# Patient Record
Sex: MALE | Race: WHITE | NOT HISPANIC OR LATINO | Employment: UNEMPLOYED | URBAN - METROPOLITAN AREA
[De-identification: names, ages, dates, MRNs, and addresses within clinical notes are randomized per-mention and may not be internally consistent; named-entity substitution may affect disease eponyms.]

---

## 2015-08-11 LAB — HEP C HIM: NORMAL

## 2020-02-04 ENCOUNTER — TRANSFERRED RECORDS (OUTPATIENT)
Dept: MULTI SPECIALTY CLINIC | Facility: CLINIC | Age: 60
End: 2020-02-04

## 2020-02-04 LAB
CHOLEST SERPL-MCNC: 192 MG/DL (ref 100–199)
CREAT SERPL-MCNC: 1.26 MG/DL (ref 0.72–1.25)
GFR SERPL CREATININE-BSD FRML MDRD: 58 ML/MIN/1.73M2
GLUCOSE SERPL-MCNC: 90 MG/DL (ref 65–100)
HDLC SERPL-MCNC: 30 MG/DL
LDLC SERPL CALC-MCNC: 139 MG/DL
NONHDLC SERPL-MCNC: 162 MG/DL
POTASSIUM SERPL-SCNC: 3.9 MMOL/L (ref 3.5–5)
TRIGL SERPL-MCNC: 114 MG/DL
TSH SERPL-ACNC: 3.13 UIU/ML (ref 0.35–4.94)

## 2020-03-03 ENCOUNTER — OFFICE VISIT (OUTPATIENT)
Dept: INTERNAL MEDICINE | Facility: CLINIC | Age: 60
End: 2020-03-03
Payer: COMMERCIAL

## 2020-03-03 VITALS
SYSTOLIC BLOOD PRESSURE: 113 MMHG | WEIGHT: 222.7 LBS | TEMPERATURE: 98.6 F | BODY MASS INDEX: 34.95 KG/M2 | RESPIRATION RATE: 16 BRPM | DIASTOLIC BLOOD PRESSURE: 72 MMHG | OXYGEN SATURATION: 96 % | HEART RATE: 66 BPM | HEIGHT: 67 IN

## 2020-03-03 DIAGNOSIS — I10 BENIGN ESSENTIAL HYPERTENSION: Primary | ICD-10-CM

## 2020-03-03 DIAGNOSIS — E78.5 HYPERLIPIDEMIA LDL GOAL <130: ICD-10-CM

## 2020-03-03 DIAGNOSIS — G47.33 OSA (OBSTRUCTIVE SLEEP APNEA): ICD-10-CM

## 2020-03-03 DIAGNOSIS — E03.9 ACQUIRED HYPOTHYROIDISM: ICD-10-CM

## 2020-03-03 PROCEDURE — 99203 OFFICE O/P NEW LOW 30 MIN: CPT | Performed by: INTERNAL MEDICINE

## 2020-03-03 RX ORDER — ASPIRIN 81 MG/1
1 TABLET ORAL DAILY
COMMUNITY
Start: 2019-05-29

## 2020-03-03 RX ORDER — TRIAMTERENE/HYDROCHLOROTHIAZID 37.5-25 MG
1 TABLET ORAL DAILY
COMMUNITY
Start: 2020-03-02 | End: 2021-03-05

## 2020-03-03 RX ORDER — ACETAMINOPHEN 160 MG
2 TABLET,DISINTEGRATING ORAL DAILY
COMMUNITY
Start: 2019-12-31 | End: 2020-04-01

## 2020-03-03 RX ORDER — ATENOLOL 50 MG/1
50 TABLET ORAL DAILY
COMMUNITY
Start: 2019-01-04 | End: 2020-03-31

## 2020-03-03 RX ORDER — CALCIUM CARBONATE 750 MG/1
1500 TABLET, CHEWABLE ORAL DAILY
COMMUNITY
Start: 2015-02-04

## 2020-03-03 RX ORDER — LISINOPRIL 10 MG/1
10 TABLET ORAL DAILY
COMMUNITY
Start: 2020-03-02 | End: 2021-03-01

## 2020-03-03 RX ORDER — ATORVASTATIN CALCIUM 10 MG/1
10 TABLET, FILM COATED ORAL DAILY
COMMUNITY
Start: 2020-02-04 | End: 2020-09-17

## 2020-03-03 RX ORDER — LEVOTHYROXINE SODIUM 150 UG/1
1 TABLET ORAL DAILY
COMMUNITY
Start: 2020-03-02 | End: 2021-03-01

## 2020-03-03 SDOH — HEALTH STABILITY: MENTAL HEALTH: HOW OFTEN DO YOU HAVE A DRINK CONTAINING ALCOHOL?: MONTHLY OR LESS

## 2020-03-03 ASSESSMENT — MIFFLIN-ST. JEOR: SCORE: 1778.79

## 2020-03-03 NOTE — PROGRESS NOTES
Subjective     Grant Shoemaker is a 59 year old male who presents to clinic today for the following health issues:    HPI     He presents as a new patient to this clinic.  His previous care had been through [facility].     Problems:  1.  Hypertension: He has been stable on this medication.  2.  Hyperlipidemia: He has had some issues with statins, had a lot of muscle problems with simvastatin.  He is tolerated pravastatin, atorvastatin.  3.  Hypothyroidism: He has been stable on his current medication dose.  4.  Obstructive sleep apnea: He is doing well on CPAP, the last machine was replaced in 2018 but he does need some supplies for this.    He has a previous history of some mood issues which have resolved.  He has had adenomatous polyps of the colon.      Please abstract the following data from this visit with this patient into the appropriate field in Epic:    Tests that can be patient reported without a hard copy:    Colonoscopy done on this date: 4/24/18 (approximately), by this group: [facility], results were normal, recheck 5 years.     Please abstract the following data from this visit with this patient into the appropriate field in Epic:      Other Tests found in the patient's chart through Chart Review/Care Everywhere:    Hepatitis C done by this group [facility] on this date: 8/11/2015    Note to Abstraction: If this section is blank, no results were found via Chart Review/Care Everywhere.      Patient Active Problem List   Diagnosis     ANN (obstructive sleep apnea)     Hyperlipidemia LDL goal <130     Benign essential hypertension     Acquired hypothyroidism     Past Medical History:   Diagnosis Date     Adenomatous polyp of colon      Hyperlipidemia      Hypertension      Hypothyroidism      ANN (obstructive sleep apnea)       Past Surgical History:   Procedure Laterality Date     C CERV ARTIFIC DISKECTOMY       C LAMINOTOMY, SINGLE CERVICAL       RELEASE CARPAL TUNNEL       RELEASE TRIGGER FINGER      x6     "  Current Outpatient Medications   Medication Sig Dispense Refill     ASPIRIN ADULT LOW STRENGTH 81 MG EC tablet Take 1 tablet by mouth daily       atenolol (TENORMIN) 50 MG tablet Take 50 mg by mouth daily       atorvastatin (LIPITOR) 10 MG tablet Take 10 mg by mouth daily       calcium carbonate 750 MG CHEW Take 1,500 mg by mouth daily       Cholecalciferol (VITAMIN D3) 50 MCG (2000 UT) CAPS Take 2 tablets by mouth daily       levothyroxine (SYNTHROID/LEVOTHROID) 150 MCG tablet Take 1 tablet by mouth daily       lisinopril (ZESTRIL) 10 MG tablet Take 10 mg by mouth daily       triamterene-HCTZ (MAXZIDE-25) 37.5-25 MG tablet Take 1 tablet by mouth daily        Social History     Tobacco Use     Smoking status: Never Smoker     Smokeless tobacco: Never Used   Substance Use Topics     Alcohol use: Yes     Frequency: Monthly or less     Drug use: Never      .  Family History   Problem Relation Age of Onset     Pancreatic Cancer Mother      Esophageal Cancer Father      Thyroid Disease Sister             Reviewed and updated as needed this visit by Provider         Review of Systems   No fever, chills, no dyspnea on exertion, no chest pain, palpitations, PND, orthopnea, edema, syncope, headache, abdominal pain       Objective    /72 (BP Location: Left arm, Patient Position: Sitting, Cuff Size: Adult Regular)   Pulse 66   Temp 98.6  F (37  C) (Oral)   Resp 16   Ht 1.702 m (5' 7\")   Wt 101 kg (222 lb 11.2 oz)   SpO2 96%   BMI 34.88 kg/m    Body mass index is 34.88 kg/m .  Physical Exam     Lungs:  CV: normal S1, S2 without murmur, S3 or S4.   Carotids without bruits            Assessment & Plan     1. Benign essential hypertension  Well-controlled, continue medication    2. Hyperlipidemia LDL goal <130  Recommend check labs soon  - Lipid panel reflex to direct LDL Fasting; Future  - ALT; Future    3. Acquired hypothyroidism  Labs controlled recently, continue medication    4. ANN (obstructive sleep " apnea)  Provided with orders for CPAP supplies  - Sleep DME         Return in about 5 months (around 8/1/2020) for Physical Exam.    Sheridan Mistry MD  [facility]

## 2020-03-03 NOTE — Clinical Note
Tests that can be patient reported without a hard copy:    Colonoscopy done on this date: 4/24/18 (approximately), by this group: [facility], results were normal, recheck 5 years.     Please abstract the following data from this visit with this patient into the appropriate field in Epic:      Other Tests found in the patient's chart through Chart Review/Care Everywhere:    Hepatitis C done by this group [facility] on this date: 8/11/2015    Note to Abstraction: If this section is blank, no results were found via Chart Review/Care Everywhere.

## 2020-03-17 PROBLEM — E78.5 HYPERLIPIDEMIA LDL GOAL <130: Status: ACTIVE | Noted: 2020-03-17

## 2020-03-17 PROBLEM — E03.9 ACQUIRED HYPOTHYROIDISM: Status: ACTIVE | Noted: 2020-03-17

## 2020-03-17 PROBLEM — I10 BENIGN ESSENTIAL HYPERTENSION: Status: ACTIVE | Noted: 2020-03-17

## 2020-03-25 ENCOUNTER — OFFICE VISIT (OUTPATIENT)
Dept: URGENT CARE | Facility: URGENT CARE | Age: 60
End: 2020-03-25
Payer: COMMERCIAL

## 2020-03-25 VITALS
OXYGEN SATURATION: 98 % | RESPIRATION RATE: 20 BRPM | TEMPERATURE: 97.8 F | HEART RATE: 78 BPM | BODY MASS INDEX: 34.77 KG/M2 | WEIGHT: 222 LBS | DIASTOLIC BLOOD PRESSURE: 74 MMHG | SYSTOLIC BLOOD PRESSURE: 120 MMHG

## 2020-03-25 DIAGNOSIS — Z20.822 SUSPECTED COVID-19 VIRUS INFECTION: Primary | ICD-10-CM

## 2020-03-25 DIAGNOSIS — J02.9 SORE THROAT: ICD-10-CM

## 2020-03-25 LAB
DEPRECATED S PYO AG THROAT QL EIA: NEGATIVE
SPECIMEN SOURCE: NORMAL
SPECIMEN SOURCE: NORMAL
STREP GROUP A PCR: NOT DETECTED

## 2020-03-25 PROCEDURE — 40001204 ZZHCL STATISTIC STREP A RAPID: Performed by: FAMILY MEDICINE

## 2020-03-25 PROCEDURE — 87651 STREP A DNA AMP PROBE: CPT | Performed by: FAMILY MEDICINE

## 2020-03-25 PROCEDURE — 99213 OFFICE O/P EST LOW 20 MIN: CPT | Performed by: PHYSICIAN ASSISTANT

## 2020-03-25 NOTE — PATIENT INSTRUCTIONS
Patient Education     Self-Care for Sore Throats    Sore throats happen for many reasons, such as colds, allergies, and infections caused by viruses or bacteria. In any case, your throat becomes red and sore. Your goal for self-care is to reduce your discomfort while giving your throat a chance to heal.  Moisten and soothe your throat  Tips include the following:    Try a sip of water first thing after waking up.    Keep your throat moist by drinking 6 or more glasses of clear liquids every day.    Run a cool-air humidifier in your room overnight.    Avoid cigarette smoke.     Suck on throat lozenges, cough drops, hard candy, ice chips, or frozen fruit-juice bars. Use the sugar-free versions if your diet or medical condition requires them.  Gargle to ease irritation  Gargling every hour or 2 can ease irritation. Try gargling with 1 of these solutions:    1/4 teaspoon of salt in 1/2 cup of warm water    An over-the-counter anesthetic gargle  Use medicine for more relief  Over-the-counter medicine can reduce sore throat symptoms. Ask your pharmacist if you have questions about which medicine to use:    Ease pain with anesthetic sprays. Aspirin or an aspirin substitute also helps. Remember, never give aspirin to anyone 18 or younger, or if you are already taking blood thinners.     For sore throats caused by allergies, try antihistamines to block the allergic reaction.    Remember: unless a sore throat is caused by a bacterial infection, antibiotics won t help you.  Prevent future sore throats  Prevention tips include the following:    Stop smoking or reduce contact with secondhand smoke. Smoke irritates the tender throat lining.    Limit contact with pets and with allergy-causing substances, such as pollen and mold.    When you re around someone with a sore throat or cold, wash your hands often to keep viruses or bacteria from spreading.    Don t strain your vocal cords.  Contact your healthcare provider if you  have:    A temperature over 101 F (38.3 C)    White spots on the throat    Great difficulty swallowing    Trouble breathing    A skin rash    Recent exposure to someone else with strep bacteria    Severe hoarseness and swollen glands in the neck or jaw  Date Last Reviewed: 8/1/2016 2000-2019 [employer]. [address] [place] [id number]. All rights reserved. This information is not intended as a substitute for professional medical care. Always follow your healthcare professional's instructions.

## 2020-03-25 NOTE — LETTER
[facility]  [address]  [address]  [place] [address]  Phone: [phone]  Fax: [phone]    March 25, 2020        Grant Shoemaker  [address] [address]          To whom it may concern:    RE: Grant Shoemaker    Patient was seen and treated today at our clinic.  Please excuse from work for 7 days.  If Mr. Shoemaker is symptomatic at 7 days he must stay home until asymptomatic for 72 hours.     Please contact me for questions or concerns.      Sincerely,        Willy Lamas PA-C

## 2020-03-31 DIAGNOSIS — Z78.9 TAKES DIETARY SUPPLEMENTS: ICD-10-CM

## 2020-03-31 DIAGNOSIS — I10 BENIGN ESSENTIAL HYPERTENSION: Primary | ICD-10-CM

## 2020-03-31 NOTE — TELEPHONE ENCOUNTER
"Requested Prescriptions   Pending Prescriptions Disp Refills     atenolol (TENORMIN) 50 MG tablet       Sig: Take 1 tablet (50 mg) by mouth daily       Beta-Blockers Protocol Passed - 3/31/2020 11:54 AM        Passed - Blood pressure under 140/90 in past 12 months     BP Readings from Last 3 Encounters:   03/25/20 120/74   03/03/20 113/72                 Passed - Patient is age 6 or older        Passed - Recent (12 mo) or future (30 days) visit within the authorizing provider's specialty     Patient has had an office visit with the authorizing provider or a provider within the authorizing providers department within the previous 12 mos or has a future within next 30 days. See \"Patient Info\" tab in inbasket, or \"Choose Columns\" in Meds & Orders section of the refill encounter.              Passed - Medication is active on med list           Cholecalciferol (VITAMIN D3) 50 MCG (2000 UT) CAPS       Sig: Take 2 tablets by mouth daily       Vitamin Supplements (Adult) Protocol Passed - 3/31/2020 11:54 AM        Passed - High dose Vitamin D not ordered        Passed - Recent (12 mo) or future (30 days) visit within the authorizing provider's specialty     Patient has had an office visit with the authorizing provider or a provider within the authorizing providers department within the previous 12 mos or has a future within next 30 days. See \"Patient Info\" tab in inbasket, or \"Choose Columns\" in Meds & Orders section of the refill encounter.              Passed - Medication is active on med list           Patient calling for refills- no need to callback if OK.  Requested Prescriptions   Pending Prescriptions Disp Refills     atenolol (TENORMIN) 50 MG tablet        Sig: Take 1 tablet (50 mg) by mouth daily       There is no refill protocol information for this order        Cholecalciferol (VITAMIN D3) 50 MCG (2000 UT) CAPS        Sig: Take 2 tablets by mouth daily       There is no refill protocol information for this order "        Last Written Prescription Date: other clinic provider  Last Fill Quantity: ,  # refills:    Last office visit: 3/3/2020 with prescribing provider:  Fidelia   Future Office Visit:  Phys due 8-1-2020.

## 2020-04-01 RX ORDER — ATENOLOL 50 MG/1
50 TABLET ORAL DAILY
Qty: 90 TABLET | Refills: 1 | Status: SHIPPED | OUTPATIENT
Start: 2020-04-01 | End: 2020-10-06

## 2020-04-01 RX ORDER — ACETAMINOPHEN 160 MG
2 TABLET,DISINTEGRATING ORAL DAILY
Qty: 180 CAPSULE | Refills: 1 | Status: SHIPPED | OUTPATIENT
Start: 2020-04-01 | End: 2020-10-06

## 2020-06-16 ENCOUNTER — TELEPHONE (OUTPATIENT)
Dept: INTERNAL MEDICINE | Facility: CLINIC | Age: 60
End: 2020-06-16

## 2020-06-16 NOTE — TELEPHONE ENCOUNTER
Called patient and he stated the pain started a couple of weeks ago.  Patient describes it as sharp, cramping pain with spasms in bilateral back below his ribcage.  Patient stated he was having similar sharp, cramp like pains that were in his legs.  Patient wondering if he should stop the medication for a week to see if it help.  Patient stated that his pain happens during the night and in am.  Patient stated he has had back pain in the past and states this pain feels different.  Patient denied any radiating pain.     Ok to leave detailed message.

## 2020-06-16 NOTE — TELEPHONE ENCOUNTER
Patient calling  He has had some lower back pain on both sides of his back and feels its because of   Atorvastatin  Please advise  Ok to call and beti [phone]

## 2020-06-17 NOTE — TELEPHONE ENCOUNTER
This is probably muscle strain. Most problems with statins hurt everywhere. He can hold the med for 10-14 days but the back pain may get better with time anyway so if symptoms go away, he can retry it every other day. If the pain does not improve, needs to be seen here or by [facility].

## 2020-06-17 NOTE — TELEPHONE ENCOUNTER
Called patient and advised him of message below. Patient verbalized understanding and agrees to plan. Patient will call back in 10-14 days to provide an update.     Patient reports in the past he had a similar reaction in his legs to pravastatin. Will route to primary care provider as an FYI.

## 2020-06-17 NOTE — TELEPHONE ENCOUNTER
Patient calls back to clarify, it could have been Pravastatin or Simvastatin that he had taken in the past that caused pain in his legs. [facility] has taken both in the past.

## 2020-09-07 ENCOUNTER — NURSE TRIAGE (OUTPATIENT)
Dept: NURSING | Facility: CLINIC | Age: 60
End: 2020-09-07

## 2020-09-07 NOTE — TELEPHONE ENCOUNTER
"Patient states has 5  \"plantar warts\" on bottom of his feet x 1 year.  Describes each growth as smaller than a beckie and white.  Progressively getting larger and more painful.  Pain 3-4/10.  Afebrile.    Protocol-  Skin Lesion- Moles or Growths  Care advice reviewed.   Disposition-  Per Visit Selection Guide  Advised office appointment  Caller states understanding of the recommended disposition.   Advised to call back if further questions or concerns.     CEASAR Rhodes RN  [facility]     COVID 19 Nurse Triage Plan/Patient Instructions    Please be aware that novel coronavirus (COVID-19) may be circulating in the community. If you develop symptoms such as fever, cough, or SOB or if you have concerns about the presence of another infection including coronavirus (COVID-19), please contact your health care provider or visit [email or web address].     Disposition/Instructions    In-Person Visit with provider recommended. Reference Visit Selection Guide.    Thank you for taking steps to prevent the spread of this virus.  o Limit your contact with others.  o Wear a simple mask to cover your cough.  o Wash your hands well and often.    Resources    [facility]: About COVID-19: [email or web address]    CDC: What to Do If You're Sick: www.cdc.gov/coronavirus/2019-ncov/about/steps-when-sick.html    CDC: Ending Home Isolation: www.cdc.gov/coronavirus/2019-ncov/hcp/disposition-in-home-patients.html     CDC: Caring for Someone: www.cdc.gov/coronavirus/2019-ncov/if-you-are-sick/care-for-someone.html     [facility]: Interim Guidance for Hospital Discharge to Home: www.health.[facility].[email or web address]/diseases/coronavirus/hcp/hospdischarge.pdf    [facility] clinical trials (COVID-19 research studies): [email or web address]/umn-clinical-trials     Below are the COVID-19 hotlines at the [facility] of Health ([facility]). Interpreters are available.   o For health questions: Call 512-531-4040 or 1-283.733.7268 " (7 a.m. to 7 p.m.)  o For questions about schools and childcare: Call 226-090-2857 or 1-356.721.2425 (7 a.m. to 7 p.m.)                       Reason for Disposition    [1] Skin growth or mole AND[2] larger than a pencil eraser, or increasing in size    Additional Information    Negative: [1] Looks infected AND [2] large red area (> 2 in. or 5 cm)    Negative: [1] Fever AND [2] bump is tender to touch    Negative: [1] Fever AND [2] bright red area or streak    Negative: [1] Looks infected (spreading redness, pus) AND [2] no fever    Negative: Looks like a boil, infected sore, or deep ulcer    Negative: Caller can't describe it clearly    Protocols used: SKIN LESION - MOLES OR GROWTHS-A-AH

## 2020-09-17 ENCOUNTER — OFFICE VISIT (OUTPATIENT)
Dept: INTERNAL MEDICINE | Facility: CLINIC | Age: 60
End: 2020-09-17
Payer: COMMERCIAL

## 2020-09-17 ENCOUNTER — TELEPHONE (OUTPATIENT)
Dept: INTERNAL MEDICINE | Facility: CLINIC | Age: 60
End: 2020-09-17

## 2020-09-17 VITALS
SYSTOLIC BLOOD PRESSURE: 94 MMHG | WEIGHT: 222.9 LBS | DIASTOLIC BLOOD PRESSURE: 72 MMHG | TEMPERATURE: 98.4 F | HEART RATE: 59 BPM | RESPIRATION RATE: 16 BRPM | OXYGEN SATURATION: 98 % | BODY MASS INDEX: 34.99 KG/M2 | HEIGHT: 67 IN

## 2020-09-17 DIAGNOSIS — B07.0 PLANTAR WARTS: Primary | ICD-10-CM

## 2020-09-17 PROCEDURE — 17110 DESTRUCTION B9 LES UP TO 14: CPT | Performed by: NURSE PRACTITIONER

## 2020-09-17 ASSESSMENT — MIFFLIN-ST. JEOR: SCORE: 1774.7

## 2020-09-17 NOTE — NURSING NOTE
"4 warts on left foot and 1 on rt foot.  Vital signs:  Temp: 98.4  F (36.9  C) Temp src: Oral BP: 94/72 Pulse: 59   Resp: 16 SpO2: 98 %     Height: 170.2 cm (5' 7\") Weight: 101.1 kg (222 lb 14.4 oz)  Estimated body mass index is 34.91 kg/m  as calculated from the following:    Height as of this encounter: 1.702 m (5' 7\").    Weight as of this encounter: 101.1 kg (222 lb 14.4 oz).          "

## 2020-09-17 NOTE — TELEPHONE ENCOUNTER
Patient has warts removed and was going to take advil for pain but the bottle states do not use if your over the age of 60 yrs. Patient would like to know if its still safe to use or if he should use something else. Ok to call and lm [phone] seen by Ilene

## 2020-09-17 NOTE — PROGRESS NOTES
"Subjective     Grant Shoemaker is a 60 year old male who presents to clinic today for the following health issues:    HPI       Warts  Onset/Duration: 1 year  Description (location/number): $ on L foot, 1 o n R foot  Accompanying signs and symptoms (pain, redness):  no   History: prior warts:  no   Therapies tried and outcome: none  Job requires long hours standing, does not want all warts treated at once          Review of Systems   Constitutional, HEENT, cardiovascular, pulmonary, gi and gu systems are negative, except as otherwise noted.      Objective    BP 94/72 (BP Location: Right arm, Patient Position: Sitting, Cuff Size: Adult Large)   Pulse 59   Temp 98.4  F (36.9  C) (Oral)   Resp 16   Ht 1.702 m (5' 7\")   Wt 101.1 kg (222 lb 14.4 oz)   SpO2 98%   BMI 34.91 kg/m    Body mass index is 34.91 kg/m .  Physical Exam   GENERAL: healthy, alert and no distress  SKIN:  Warts with callous plantar feet, 2 small ones L lateral sole treated with liquid nitrogen            Assessment & Plan     Plantar warts    - Orthopedic & Spine  Referral; Future     BMI:   Estimated body mass index is 34.91 kg/m  as calculated from the following:    Height as of this encounter: 1.702 m (5' 7\").    Weight as of this encounter: 101.1 kg (222 lb 14.4 oz).           F/u podiatry for further debridement and treatment.    Lili Odom NP  [facility]    "

## 2020-09-30 ENCOUNTER — OFFICE VISIT (OUTPATIENT)
Dept: PODIATRY | Facility: CLINIC | Age: 60
End: 2020-09-30
Attending: NURSE PRACTITIONER
Payer: COMMERCIAL

## 2020-09-30 VITALS
WEIGHT: 222.9 LBS | DIASTOLIC BLOOD PRESSURE: 68 MMHG | SYSTOLIC BLOOD PRESSURE: 104 MMHG | HEIGHT: 67 IN | BODY MASS INDEX: 34.99 KG/M2

## 2020-09-30 DIAGNOSIS — M79.672 FOOT PAIN, BILATERAL: ICD-10-CM

## 2020-09-30 DIAGNOSIS — B07.0 PLANTAR WARTS: ICD-10-CM

## 2020-09-30 DIAGNOSIS — L84 CALLUS: Primary | ICD-10-CM

## 2020-09-30 DIAGNOSIS — M79.671 FOOT PAIN, BILATERAL: ICD-10-CM

## 2020-09-30 PROCEDURE — 99243 OFF/OP CNSLTJ NEW/EST LOW 30: CPT | Mod: 25 | Performed by: PODIATRIST

## 2020-09-30 PROCEDURE — 17110 DESTRUCTION B9 LES UP TO 14: CPT | Performed by: PODIATRIST

## 2020-09-30 ASSESSMENT — MIFFLIN-ST. JEOR: SCORE: 1774.7

## 2020-09-30 NOTE — LETTER
9/30/2020         RE: Grant Shoemaker  [address] [address]        Dear Colleague,    Thank you for referring your patient, Grant Shoemaker, to the [facility]. Please see a copy of my visit note below.    PATIENT HISTORY:  Lili Odom NP requested I see this patient for their foot issue.  Grant Shoemaker is a 60 year old male who presents to clinic for plantar warts.  Notes that he was seen approximately 3 weeks ago for his left foot.  They use liquid nitrogen.  He now has noticed 1 in his right great toe.  He would like that treated today.  Pain is 3 out of 10 at its worst.  Usually with increased walking and pressure.  Can be sharp.  He has had this for less than a year.  Would like to get rid of it.  Denies specific injury.  Denies fever, nausea, vomiting.    Review of Systems:  Patient denies fever, chills, rash, wound, stiffness,  numbness, weakness, heart burn, blood in stool, chest pain with activity, calf pain when walking, shortness of breath with activity, chronic cough, easy bleeding/bruising, swelling of ankles, excessive thirst, fatigue, depression, anxiety.  Patient admits to limping at times.     PAST MEDICAL HISTORY:   Past Medical History:   Diagnosis Date     Adenomatous polyp of colon      Hyperlipidemia      Hypertension      Hypothyroidism      ANN (obstructive sleep apnea)         PAST SURGICAL HISTORY:   Past Surgical History:   Procedure Laterality Date     C CERV ARTIFIC DISKECTOMY       C LAMINOTOMY, SINGLE CERVICAL       RELEASE CARPAL TUNNEL       RELEASE TRIGGER FINGER      x6        MEDICATIONS:   Current Outpatient Medications:      ASPIRIN ADULT LOW STRENGTH 81 MG EC tablet, Take 1 tablet by mouth daily, Disp: , Rfl:      atenolol (TENORMIN) 50 MG tablet, Take 1 tablet (50 mg) by mouth daily, Disp: 90 tablet, Rfl: 1     calcium carbonate 750 MG CHEW, Take 1,500 mg by mouth daily, Disp: , Rfl:      Cholecalciferol (VITAMIN D3) 50 MCG (2000 UT)  CAPS, Take 2 tablets by mouth daily, Disp: 180 capsule, Rfl: 1     levothyroxine (SYNTHROID/LEVOTHROID) 150 MCG tablet, Take 1 tablet by mouth daily, Disp: , Rfl:      lisinopril (ZESTRIL) 10 MG tablet, Take 10 mg by mouth daily, Disp: , Rfl:      triamterene-HCTZ (MAXZIDE-25) 37.5-25 MG tablet, Take 1 tablet by mouth daily, Disp: , Rfl:      ALLERGIES:  No Known Allergies     SOCIAL HISTORY:   Social History     Socioeconomic History     Marital status:      Spouse name: Not on file     Number of children: Not on file     Years of education: Not on file     Highest education level: Not on file   Occupational History     Not on file   Social Needs     Financial resource strain: Not on file     Food insecurity     Worry: Not on file     Inability: Not on file     Transportation needs     Medical: Not on file     Non-medical: Not on file   Tobacco Use     Smoking status: Never Smoker     Smokeless tobacco: Never Used   Substance and Sexual Activity     Alcohol use: Yes     Frequency: Monthly or less     Drug use: Never     Sexual activity: Not Currently   Lifestyle     Physical activity     Days per week: Not on file     Minutes per session: Not on file     Stress: Not on file   Relationships     Social connections     Talks on phone: Not on file     Gets together: Not on file     Attends Buddhist service: Not on file     Active member of club or organization: Not on file     Attends meetings of clubs or organizations: Not on file     Relationship status: Not on file     Intimate partner violence     Fear of current or ex partner: Not on file     Emotionally abused: Not on file     Physically abused: Not on file     Forced sexual activity: Not on file   Other Topics Concern     Not on file   Social History Narrative     Not on file        FAMILY HISTORY:   Family History   Problem Relation Age of Onset     Pancreatic Cancer Mother      Esophageal Cancer Father      Thyroid Disease Sister         EXAM:Vitals:  "/68   Ht 1.702 m (5' 7\")   Wt 101.1 kg (222 lb 14.4 oz)   BMI 34.91 kg/m    BMI= Body mass index is 34.91 kg/m .    General appearance: Patient is alert and fully cooperative with history & exam.  No sign of distress is noted during the visit.     Psychiatric: Affect is pleasant & appropriate.  Patient appears motivated to improve health.     Respiratory: Breathing is regular & unlabored while sitting.     HEENT: Hearing is intact to spoken word.  Speech is clear.  No gross evidence of visual impairment that would impact ambulation.     Dermatologic: Localized cauliflower lesion to the plantar aspect of the right first IPJ.  Pinpoint bleeding upon debridement.  Localized hyperkeratotic lesions x4 to the plantar aspect of the left foot.  No open lesions or acute signs of infection noted.     Vascular: DP & PT pulses are intact & regular bilaterally.  No significant edema or varicosities noted.  CFT and skin temperature is normal to both lower extremities.     Neurologic: Lower extremity sensation is intact to light touch.  No evidence of weakness or contracture in the lower extremities.  No evidence of neuropathy.     Musculoskeletal: Patient is ambulatory without assistive device or brace.  No gross ankle deformity noted.  No foot or ankle joint effusion is noted.     ASSESSMENT:    Plantar warts  Callus  Foot pain, bilateral     PLAN:  Reviewed patient's chart in epic. Discussed causes and treatments of warts including freezing, aldera cream, shaving them down, acid, curretting them out, and monitoring.  Also discussed that there is nothing that is 100% effective at getting rid of warts and a majority of them go away on their own over time.    He would like the right great toe treated with liquid nitrogen today.  See procedure below.    Discussed causes of keratomas.  They are due to areas of increase friction.  Hammertoes can create these as they put more pressure to the metatarsal head.  Discussed " treatments such as using foot file, pumice stone, metatarsal pads, orthotics, and not walking barefoot.     We discussed the cost structure of callus care if they were to come back and have it treated in the clinic if insurance does not cover it and explained that they would be billed. They were also provided information on places to get the callus treatment.    He is going to use a pumice stone to both feet in the bath or shower and lotion the feet daily.  All questions were answered to patient satisfaction he will call further questions or concerns.    Procedure: After verbal consent, Using a # 15 blade, the wart/s were debrided to pinpoint bleeding.  Three 5 second applications of liquid nitrogen were then applied to the warts.  Patient tolerated the procedure well.           Valerie Leach DPM, Podiatry/Foot and Ankle Surgery        Recommended to Grant Shoemaker to follow up with Primary Care provider regarding elevated blood pressure.        Again, thank you for allowing me to participate in the care of your patient.        Sincerely,        Valerie Leach DPM, Podiatry/Foot and Ankle Surgery

## 2020-09-30 NOTE — PATIENT INSTRUCTIONS
"Thank you for choosing [facility] / Foot & Ankle Surgery!    [facility] SCHEDULE SURGERY: [phone]   [address] BILLING QUESTIONS: [phone]   [place] [address] AFTER HOURS: [phone]   PH: [phone] CONSUMER POOLE LINE:[phone]   FAX: [phone] APPOINTMENTS: [phone]     PLANTAR WARTS   Plantar warts are a viral skin infection. As with most viral infections, there is no cure, only treatment. The virus is also quite superficial, so the immune system cannot recognize it as a problem. Therefore, treatment is aimed at causing an insult that the immune system can recognize. Typically plantar warts are treated by applying liquid nitrogen, to cause a local frostbite injury, or a strong acid, to cause a blister. Sometimes medications or creams that boost immune response are prescribed.     If your treatment was with the strong acid (phenol, tri-chlor, cantharadine), you will likely develop a very tender, brown fluid-filled blister. This is normal and the blister should be allowed to break on its own and dry out. Once it is dried out, use a pumice stone to trim away the dry skin and use an over-the-counter salicylic acid product in between appointments. Call the clinic if you have any concerns regarding your blister.     The regimen between office visits should include: daily trimming with the pumice stone, application of the OTC product, and dressing with a small band-aid or piece of duct tape. You should repeat this daily until your next visit in 2-3 weeks. There is a prescription cream as well (Aldera) that can be applied between visits. This can sometimes cause surrounding skin irritation.    Duct tape is a non invasive treatment to warts. Usually requires reapplying it every night. It helps to \"choke out\" the wart. Trimming the wart down with a razor first may also help.     Again, because there is no cure for warts, you may have 6 or more " visits depending on how your wart responds. Please call the clinic if you have questions or concerns.

## 2020-09-30 NOTE — PROGRESS NOTES
PATIENT HISTORY:  Lili Odom NP requested I see this patient for their foot issue.  Grant Shoemaker is a 60 year old male who presents to clinic for plantar warts.  Notes that he was seen approximately 3 weeks ago for his left foot.  They use liquid nitrogen.  He now has noticed 1 in his right great toe.  He would like that treated today.  Pain is 3 out of 10 at its worst.  Usually with increased walking and pressure.  Can be sharp.  He has had this for less than a year.  Would like to get rid of it.  Denies specific injury.  Denies fever, nausea, vomiting.    Review of Systems:  Patient denies fever, chills, rash, wound, stiffness,  numbness, weakness, heart burn, blood in stool, chest pain with activity, calf pain when walking, shortness of breath with activity, chronic cough, easy bleeding/bruising, swelling of ankles, excessive thirst, fatigue, depression, anxiety.  Patient admits to limping at times.     PAST MEDICAL HISTORY:   Past Medical History:   Diagnosis Date     Adenomatous polyp of colon      Hyperlipidemia      Hypertension      Hypothyroidism      ANN (obstructive sleep apnea)         PAST SURGICAL HISTORY:   Past Surgical History:   Procedure Laterality Date     C CERV ARTIFIC DISKECTOMY       C LAMINOTOMY, SINGLE CERVICAL       RELEASE CARPAL TUNNEL       RELEASE TRIGGER FINGER      x6        MEDICATIONS:   Current Outpatient Medications:      ASPIRIN ADULT LOW STRENGTH 81 MG EC tablet, Take 1 tablet by mouth daily, Disp: , Rfl:      atenolol (TENORMIN) 50 MG tablet, Take 1 tablet (50 mg) by mouth daily, Disp: 90 tablet, Rfl: 1     calcium carbonate 750 MG CHEW, Take 1,500 mg by mouth daily, Disp: , Rfl:      Cholecalciferol (VITAMIN D3) 50 MCG (2000 UT) CAPS, Take 2 tablets by mouth daily, Disp: 180 capsule, Rfl: 1     levothyroxine (SYNTHROID/LEVOTHROID) 150 MCG tablet, Take 1 tablet by mouth daily, Disp: , Rfl:      lisinopril (ZESTRIL) 10 MG tablet, Take 10 mg by mouth daily, Disp: , Rfl:  "     triamterene-HCTZ (MAXZIDE-25) 37.5-25 MG tablet, Take 1 tablet by mouth daily, Disp: , Rfl:      ALLERGIES:  No Known Allergies     SOCIAL HISTORY:   Social History     Socioeconomic History     Marital status:      Spouse name: Not on file     Number of children: Not on file     Years of education: Not on file     Highest education level: Not on file   Occupational History     Not on file   Social Needs     Financial resource strain: Not on file     Food insecurity     Worry: Not on file     Inability: Not on file     Transportation needs     Medical: Not on file     Non-medical: Not on file   Tobacco Use     Smoking status: Never Smoker     Smokeless tobacco: Never Used   Substance and Sexual Activity     Alcohol use: Yes     Frequency: Monthly or less     Drug use: Never     Sexual activity: Not Currently   Lifestyle     Physical activity     Days per week: Not on file     Minutes per session: Not on file     Stress: Not on file   Relationships     Social connections     Talks on phone: Not on file     Gets together: Not on file     Attends [employer] service: Not on file     Active member of club or organization: Not on file     Attends meetings of clubs or organizations: Not on file     Relationship status: Not on file     Intimate partner violence     Fear of current or ex partner: Not on file     Emotionally abused: Not on file     Physically abused: Not on file     Forced sexual activity: Not on file   Other Topics Concern     Not on file   Social History Narrative     Not on file        FAMILY HISTORY:   Family History   Problem Relation Age of Onset     Pancreatic Cancer Mother      Esophageal Cancer Father      Thyroid Disease Sister         EXAM:Vitals: /68   Ht 1.702 m (5' 7\")   Wt 101.1 kg (222 lb 14.4 oz)   BMI 34.91 kg/m    BMI= Body mass index is 34.91 kg/m .    General appearance: Patient is alert and fully cooperative with history & exam.  No sign of distress is noted during " the visit.     Psychiatric: Affect is pleasant & appropriate.  Patient appears motivated to improve health.     Respiratory: Breathing is regular & unlabored while sitting.     HEENT: Hearing is intact to spoken word.  Speech is clear.  No gross evidence of visual impairment that would impact ambulation.     Dermatologic: Localized cauliflower lesion to the plantar aspect of the right first IPJ.  Pinpoint bleeding upon debridement.  Localized hyperkeratotic lesions x4 to the plantar aspect of the left foot.  No open lesions or acute signs of infection noted.     Vascular: DP & PT pulses are intact & regular bilaterally.  No significant edema or varicosities noted.  CFT and skin temperature is normal to both lower extremities.     Neurologic: Lower extremity sensation is intact to light touch.  No evidence of weakness or contracture in the lower extremities.  No evidence of neuropathy.     Musculoskeletal: Patient is ambulatory without assistive device or brace.  No gross ankle deformity noted.  No foot or ankle joint effusion is noted.     ASSESSMENT:    Plantar warts  Callus  Foot pain, bilateral     PLAN:  Reviewed patient's chart in epic. Discussed causes and treatments of warts including freezing, aldera cream, shaving them down, acid, curretting them out, and monitoring.  Also discussed that there is nothing that is 100% effective at getting rid of warts and a majority of them go away on their own over time.    He would like the right great toe treated with liquid nitrogen today.  See procedure below.    Discussed causes of keratomas.  They are due to areas of increase friction.  Hammertoes can create these as they put more pressure to the metatarsal head.  Discussed treatments such as using foot file, pumice stone, metatarsal pads, orthotics, and not walking barefoot.     We discussed the cost structure of callus care if they were to come back and have it treated in the clinic if insurance does not cover it  and explained that they would be billed. They were also provided information on places to get the callus treatment.    He is going to use a pumice stone to both feet in the bath or shower and lotion the feet daily.  All questions were answered to patient satisfaction he will call further questions or concerns.    Procedure: After verbal consent, Using a # 15 blade, the wart/s were debrided to pinpoint bleeding.  Three 5 second applications of liquid nitrogen were then applied to the warts.  Patient tolerated the procedure well.           Valerie Leach DPM, Podiatry/Foot and Ankle Surgery        Recommended to Grant Shoemaker to follow up with Primary Care provider regarding elevated blood pressure.

## 2020-10-03 DIAGNOSIS — Z78.9 TAKES DIETARY SUPPLEMENTS: ICD-10-CM

## 2020-10-03 DIAGNOSIS — I10 BENIGN ESSENTIAL HYPERTENSION: ICD-10-CM

## 2020-10-06 RX ORDER — ACETAMINOPHEN 160 MG
TABLET,DISINTEGRATING ORAL
Qty: 180 CAPSULE | Refills: 1 | Status: SHIPPED | OUTPATIENT
Start: 2020-10-06 | End: 2021-03-30

## 2020-10-06 RX ORDER — ATENOLOL 50 MG/1
50 TABLET ORAL DAILY
Qty: 90 TABLET | Refills: 1 | Status: SHIPPED | OUTPATIENT
Start: 2020-10-06 | End: 2021-04-01

## 2020-10-06 NOTE — TELEPHONE ENCOUNTER
"Requested Prescriptions   Pending Prescriptions Disp Refills     atenolol (TENORMIN) 50 MG tablet [Pharmacy Med Name: Atenolol Oral Tablet 50 MG] 90 tablet 0     Sig: Take 1 tablet (50 mg) by mouth daily       Beta-Blockers Protocol Passed - 10/3/2020 12:00 PM        Passed - Blood pressure under 140/90 in past 12 months     BP Readings from Last 3 Encounters:   09/30/20 104/68   09/17/20 94/72   03/25/20 120/74                 Passed - Patient is age 6 or older        Passed - Recent (12 mo) or future (30 days) visit within the authorizing provider's specialty     Patient has had an office visit with the authorizing provider or a provider within the authorizing providers department within the previous 12 mos or has a future within next 30 days. See \"Patient Info\" tab in inbasket, or \"Choose Columns\" in Meds & Orders section of the refill encounter.              Passed - Medication is active on med list           Cholecalciferol (VITAMIN D3) 50 MCG (2000 UT) CAPS [Pharmacy Med Name: Vitamin D3 Oral Capsule 50 MCG (2000 UT)] 180 capsule 0     Sig: TAKE TWO CAPSULES BY MOUTH DAILY       Vitamin Supplements (Adult) Protocol Passed - 10/3/2020 12:00 PM        Passed - High dose Vitamin D not ordered        Passed - Recent (12 mo) or future (30 days) visit within the authorizing provider's specialty     Patient has had an office visit with the authorizing provider or a provider within the authorizing providers department within the previous 12 mos or has a future within next 30 days. See \"Patient Info\" tab in inbasket, or \"Choose Columns\" in Meds & Orders section of the refill encounter.              Passed - Medication is active on med list             "

## 2021-03-01 DIAGNOSIS — E03.9 ACQUIRED HYPOTHYROIDISM: ICD-10-CM

## 2021-03-01 DIAGNOSIS — I10 BENIGN ESSENTIAL HYPERTENSION: Primary | ICD-10-CM

## 2021-03-02 NOTE — TELEPHONE ENCOUNTER
Routing refill request to provider for review/approval because:  Labs not current:    TSH   Date Value Ref Range Status   02/04/2020 3.13 0.35 - 4.94 uIU/mL Final     Creatinine   Date Value Ref Range Status   02/04/2020 1.26 (H) 0.72 - 1.25 mg/dL Final     Potassium   Date Value Ref Range Status   02/04/2020 3.9 3.5 - 5.0 mmol/L Final       Last OV 9/17/20

## 2021-03-03 RX ORDER — LISINOPRIL 10 MG/1
10 TABLET ORAL DAILY
Qty: 30 TABLET | Refills: 1 | Status: SHIPPED | OUTPATIENT
Start: 2021-03-03 | End: 2021-04-01

## 2021-03-03 RX ORDER — LEVOTHYROXINE SODIUM 150 UG/1
150 TABLET ORAL DAILY
Qty: 30 TABLET | Refills: 1 | Status: SHIPPED | OUTPATIENT
Start: 2021-03-03 | End: 2021-04-01

## 2021-03-03 NOTE — TELEPHONE ENCOUNTER
Please forward to provider covering for Dr Mistry for pts with last name starting with JUDY ( [place])

## 2021-03-03 NOTE — TELEPHONE ENCOUNTER
Next 5 appointments (look out 90 days)    Mar 11, 2021 10:00 AM  SHORT with Sheridan Mistry MD  [facility] ([facility] ) [address]  [facility] [id number] [phone]

## 2021-03-03 NOTE — TELEPHONE ENCOUNTER
As covering provider, approved Rx refill for now but for future refills will  needs appt and labs with PCP Dr. Mistry. Notify patient.

## 2021-03-05 DIAGNOSIS — I10 BENIGN ESSENTIAL HYPERTENSION: Primary | ICD-10-CM

## 2021-03-05 RX ORDER — TRIAMTERENE/HYDROCHLOROTHIAZID 37.5-25 MG
1 TABLET ORAL DAILY
Qty: 90 TABLET | Refills: 0 | Status: SHIPPED | OUTPATIENT
Start: 2021-03-05 | End: 2021-06-08

## 2021-03-05 NOTE — TELEPHONE ENCOUNTER
Please see message below and advise.    Routing refill request to provider for review/approval because:  Medication is reported/historical

## 2021-03-05 NOTE — TELEPHONE ENCOUNTER
Pt calling for attached refill request. He was unable to verify dosage with writer because he was in the car and did not have medication with him. He will not have time to call back until Monday and will run out by then. Please advise. Thanks.

## 2021-03-11 ENCOUNTER — OFFICE VISIT (OUTPATIENT)
Dept: INTERNAL MEDICINE | Facility: CLINIC | Age: 61
End: 2021-03-11
Payer: COMMERCIAL

## 2021-03-11 VITALS
DIASTOLIC BLOOD PRESSURE: 72 MMHG | OXYGEN SATURATION: 97 % | HEIGHT: 67 IN | WEIGHT: 225.4 LBS | RESPIRATION RATE: 18 BRPM | BODY MASS INDEX: 35.38 KG/M2 | HEART RATE: 68 BPM | TEMPERATURE: 98 F | SYSTOLIC BLOOD PRESSURE: 105 MMHG

## 2021-03-11 DIAGNOSIS — G47.33 OSA (OBSTRUCTIVE SLEEP APNEA): ICD-10-CM

## 2021-03-11 DIAGNOSIS — E03.9 ACQUIRED HYPOTHYROIDISM: ICD-10-CM

## 2021-03-11 DIAGNOSIS — I10 BENIGN ESSENTIAL HYPERTENSION: Primary | ICD-10-CM

## 2021-03-11 DIAGNOSIS — E66.01 MORBID OBESITY (H): ICD-10-CM

## 2021-03-11 DIAGNOSIS — E78.5 HYPERLIPIDEMIA LDL GOAL <130: ICD-10-CM

## 2021-03-11 PROCEDURE — 80048 BASIC METABOLIC PNL TOTAL CA: CPT | Performed by: INTERNAL MEDICINE

## 2021-03-11 PROCEDURE — 99214 OFFICE O/P EST MOD 30 MIN: CPT | Performed by: INTERNAL MEDICINE

## 2021-03-11 PROCEDURE — 36415 COLL VENOUS BLD VENIPUNCTURE: CPT | Performed by: INTERNAL MEDICINE

## 2021-03-11 PROCEDURE — 80061 LIPID PANEL: CPT | Performed by: INTERNAL MEDICINE

## 2021-03-11 PROCEDURE — 82043 UR ALBUMIN QUANTITATIVE: CPT | Performed by: INTERNAL MEDICINE

## 2021-03-11 PROCEDURE — 84443 ASSAY THYROID STIM HORMONE: CPT | Performed by: INTERNAL MEDICINE

## 2021-03-11 ASSESSMENT — MIFFLIN-ST. JEOR: SCORE: 1786.04

## 2021-03-11 ASSESSMENT — PAIN SCALES - GENERAL: PAINLEVEL: NO PAIN (0)

## 2021-03-11 NOTE — NURSING NOTE
"/72 (BP Location: Left arm, Patient Position: Sitting, Cuff Size: Adult Regular)   Pulse 68   Temp 98  F (36.7  C) (Oral)   Resp 18   Ht 1.702 m (5' 7\")   Wt 102.2 kg (225 lb 6.4 oz)   SpO2 97%   BMI 35.30 kg/m      "

## 2021-03-11 NOTE — LETTER
March 29, 2021      Grant Shoemaker  [address] [facility] [address]        Dear ,    We are writing to inform you of your test results.    The thyroid level is good.  Your kidney tests, creatinine, GFR and urine albumin, are normal.  The blood sugar is at the upper limits of normal.  The LDL, bad cholesterol, has gone up a little and is still above your ideal of less than 130.  Please watch the sugars and fats in your diet.  Continue current medication doses and recheck in 1 year.   Please call clinic if you have any questions.     Resulted Orders   Basic metabolic panel  (Ca, Cl, CO2, Creat, Gluc, K, Na, BUN)   Result Value Ref Range    Sodium 135 133 - 144 mmol/L    Potassium 4.3 3.4 - 5.3 mmol/L    Chloride 103 94 - 109 mmol/L    Carbon Dioxide 32 20 - 32 mmol/L    Anion Gap <1 (L) 3 - 14 mmol/L    Glucose 100 (H) 70 - 99 mg/dL      Comment:      Fasting specimen    Urea Nitrogen 21 7 - 30 mg/dL    Creatinine 1.18 0.66 - 1.25 mg/dL    GFR Estimate 66 >60 mL/min/[1.73_m2]      Comment:      Non  GFR Calc  Starting 12/18/2018, serum creatinine based estimated GFR (eGFR) will be   calculated using the Chronic Kidney Disease Epidemiology Collaboration   (CKD-EPI) equation.      GFR Estimate If Black 76 >60 mL/min/[1.73_m2]      Comment:       GFR Calc  Starting 12/18/2018, serum creatinine based estimated GFR (eGFR) will be   calculated using the Chronic Kidney Disease Epidemiology Collaboration   (CKD-EPI) equation.      Calcium 9.2 8.5 - 10.1 mg/dL   TSH with free T4 reflex   Result Value Ref Range    TSH 2.50 0.40 - 4.00 mU/L   Lipid panel reflex to direct LDL Fasting   Result Value Ref Range    Cholesterol 220 (H) <200 mg/dL      Comment:      Desirable:       <200 mg/dl    Triglycerides 124 <150 mg/dL      Comment:      Fasting specimen    HDL Cholesterol 53 >39 mg/dL    LDL Cholesterol Calculated 142 (H) <100 mg/dL      Comment:      Above desirable:  100-129  mg/dl  Borderline High:  130-159 mg/dL  High:             160-189 mg/dL  Very high:       >189 mg/dl      Non HDL Cholesterol 167 (H) <130 mg/dL      Comment:      Above Desirable:  130-159 mg/dl  Borderline high:  160-189 mg/dl  High:             190-219 mg/dl  Very high:       >219 mg/dl     Albumin Random Urine Quantitative with Creat Ratio   Result Value Ref Range    Creatinine Urine 180 mg/dL    Albumin Urine mg/L 22 mg/L    Albumin Urine mg/g Cr 12.11 0 - 17 mg/g Cr       If you have any questions or concerns, please call the clinic at the number listed above.       Sincerely,      Sheridan Mistry MD

## 2021-03-11 NOTE — LETTER
[facility] [place] [address], [address]  [place], [place]  [address]                                            TEL:[phone]  FAX:[phone]      Grant Shoemaker  [address] [place] [id number]      March 29, 2021

## 2021-03-11 NOTE — PROGRESS NOTES
"    Assessment & Plan     Benign essential hypertension  Well controlled, continue med  - Basic metabolic panel  (Ca, Cl, CO2, Creat, Gluc, K, Na, BUN)  - Albumin Random Urine Quantitative with Creat Ratio    ANN (obstructive sleep apnea)  stable    Hyperlipidemia LDL goal <130  recheck  - Lipid panel reflex to direct LDL Fasting    Acquired hypothyroidism  Clinically stable  - TSH with free T4 reflex    Morbid obesity (H)  Work on diet, exercise      BMI:   Estimated body mass index is 35.3 kg/m  as calculated from the following:    Height as of this encounter: 1.702 m (5' 7\").    Weight as of this encounter: 102.2 kg (225 lb 6.4 oz).   Weight management plan: Discussed healthy diet and exercise guidelines        Return in about 1 year (around 3/11/2022).    Sheridan Mistry MD  [facility]   Grant is a 60 year old who presents for the following health issues     HPI       Hyperlipidemia Follow-Up      Are you regularly taking any medication or supplement to lower your cholesterol?   No    Are you having muscle aches or other side effects that you think could be caused by your cholesterol lowering medication?  No    Hypertension Follow-up  Home readings are good    Do you check your blood pressure regularly outside of the clinic? Yes     Are you following a low salt diet? No    Are your blood pressures ever more than 140 on the top number (systolic) OR more   than 90 on the bottom number (diastolic), for example 140/90? No    Hypothyroidism Follow-up      Since last visit, patient describes the following symptoms: Weight stable, no hair loss, no skin changes, no constipation, no loose stools      How many servings of fruits and vegetables do you eat daily?  0-1    On average, how many sweetened beverages do you drink each day (Examples: soda, juice, sweet tea, etc.  Do NOT count diet or artificially sweetened beverages)?   0    How many days per week do you exercise enough to " "make your heart beat faster? 3 or less    How many minutes a day do you exercise enough to make your heart beat faster? 9 or less    How many days per week do you miss taking your medication? 0    Other problems:   1. Morbid obesity: weight is up a little   2. ANN: stable    Current Concerns:   none    Patient Active Problem List   Diagnosis     ANN (obstructive sleep apnea)     Hyperlipidemia LDL goal <130     Benign essential hypertension     Acquired hypothyroidism     Morbid obesity (H)       Current Outpatient Medications   Medication Sig Dispense Refill     ASPIRIN ADULT LOW STRENGTH 81 MG EC tablet Take 1 tablet by mouth daily       atenolol (TENORMIN) 50 MG tablet Take 1 tablet (50 mg) by mouth daily 90 tablet 1     calcium carbonate 750 MG CHEW Take 1,500 mg by mouth daily       Cholecalciferol (VITAMIN D3) 50 MCG (2000 UT) CAPS TAKE TWO CAPSULES BY MOUTH DAILY 180 capsule 1     levothyroxine (SYNTHROID/LEVOTHROID) 150 MCG tablet Take 1 tablet (150 mcg) by mouth daily 30 tablet 1     lisinopril (ZESTRIL) 10 MG tablet Take 1 tablet (10 mg) by mouth daily 30 tablet 1     triamterene-HCTZ (MAXZIDE-25) 37.5-25 MG tablet Take 1 tablet by mouth daily 90 tablet 0         Social History     Tobacco Use     Smoking status: Never Smoker     Smokeless tobacco: Never Used   Substance Use Topics     Alcohol use: Yes     Frequency: Monthly or less          Review of Systems   No fever, chills, no dyspnea on exertion, no chest pain, palpitations, PND, orthopnea, edema, syncope, headache, abdominal pain       Objective    /72 (BP Location: Left arm, Patient Position: Sitting, Cuff Size: Adult Regular)   Pulse 68   Temp 98  F (36.7  C) (Oral)   Resp 18   Ht 1.702 m (5' 7\")   Wt 102.2 kg (225 lb 6.4 oz)   SpO2 97%   BMI 35.30 kg/m    Body mass index is 35.3 kg/m .  Physical Exam       Lungs: clear  CV: normal S1, S2 without murmur, S3 or S4.   Abdomen: Bowel sounds normal, soft, nontender. No hepatosplenomegaly. " No masses.   No edema  Pulses full, no carotid bruits

## 2021-03-12 LAB
ANION GAP SERPL CALCULATED.3IONS-SCNC: <1 MMOL/L (ref 3–14)
BUN SERPL-MCNC: 21 MG/DL (ref 7–30)
CALCIUM SERPL-MCNC: 9.2 MG/DL (ref 8.5–10.1)
CHLORIDE SERPL-SCNC: 103 MMOL/L (ref 94–109)
CHOLEST SERPL-MCNC: 220 MG/DL
CO2 SERPL-SCNC: 32 MMOL/L (ref 20–32)
CREAT SERPL-MCNC: 1.18 MG/DL (ref 0.66–1.25)
CREAT UR-MCNC: 180 MG/DL
GFR SERPL CREATININE-BSD FRML MDRD: 66 ML/MIN/{1.73_M2}
GLUCOSE SERPL-MCNC: 100 MG/DL (ref 70–99)
HDLC SERPL-MCNC: 53 MG/DL
LDLC SERPL CALC-MCNC: 142 MG/DL
MICROALBUMIN UR-MCNC: 22 MG/L
MICROALBUMIN/CREAT UR: 12.11 MG/G CR (ref 0–17)
NONHDLC SERPL-MCNC: 167 MG/DL
POTASSIUM SERPL-SCNC: 4.3 MMOL/L (ref 3.4–5.3)
SODIUM SERPL-SCNC: 135 MMOL/L (ref 133–144)
TRIGL SERPL-MCNC: 124 MG/DL
TSH SERPL DL<=0.005 MIU/L-ACNC: 2.5 MU/L (ref 0.4–4)

## 2021-03-17 PROBLEM — E66.01 MORBID OBESITY (H): Status: ACTIVE | Noted: 2021-03-17

## 2021-03-30 DIAGNOSIS — I10 BENIGN ESSENTIAL HYPERTENSION: ICD-10-CM

## 2021-03-30 DIAGNOSIS — E03.9 ACQUIRED HYPOTHYROIDISM: ICD-10-CM

## 2021-03-30 DIAGNOSIS — Z78.9 TAKES DIETARY SUPPLEMENTS: ICD-10-CM

## 2021-04-01 RX ORDER — LEVOTHYROXINE SODIUM 150 UG/1
150 TABLET ORAL DAILY
Qty: 30 TABLET | Refills: 1 | Status: SHIPPED | OUTPATIENT
Start: 2021-04-01 | End: 2021-05-25

## 2021-04-01 RX ORDER — ACETAMINOPHEN 160 MG
TABLET,DISINTEGRATING ORAL
Qty: 180 CAPSULE | Refills: 3 | Status: SHIPPED | OUTPATIENT
Start: 2021-04-01

## 2021-04-01 RX ORDER — LISINOPRIL 10 MG/1
10 TABLET ORAL DAILY
Qty: 30 TABLET | Refills: 1 | Status: SHIPPED | OUTPATIENT
Start: 2021-04-01 | End: 2021-05-25

## 2021-04-01 RX ORDER — ATENOLOL 50 MG/1
50 TABLET ORAL DAILY
Qty: 90 TABLET | Refills: 3 | Status: SHIPPED | OUTPATIENT
Start: 2021-04-01 | End: 2022-01-05

## 2021-04-01 NOTE — TELEPHONE ENCOUNTER
"Requested Prescriptions   Pending Prescriptions Disp Refills     Cholecalciferol (VITAMIN D3) 50 MCG (2000 UT) CAPS 180 capsule 1       Vitamin Supplements (Adult) Protocol Passed - 3/30/2021  4:17 PM        Passed - High dose Vitamin D not ordered        Passed - Recent (12 mo) or future (30 days) visit within the authorizing provider's specialty     Patient has had an office visit with the authorizing provider or a provider within the authorizing providers department within the previous 12 mos or has a future within next 30 days. See \"Patient Info\" tab in inbasket, or \"Choose Columns\" in Meds & Orders section of the refill encounter.              Passed - Medication is active on med list           atenolol (TENORMIN) 50 MG tablet 90 tablet 1     Sig: Take 1 tablet (50 mg) by mouth daily       Beta-Blockers Protocol Passed - 3/30/2021  4:17 PM        Passed - Blood pressure under 140/90 in past 12 months     BP Readings from Last 3 Encounters:   03/11/21 105/72   09/30/20 104/68   09/17/20 94/72                 Passed - Patient is age 6 or older        Passed - Recent (12 mo) or future (30 days) visit within the authorizing provider's specialty     Patient has had an office visit with the authorizing provider or a provider within the authorizing providers department within the previous 12 mos or has a future within next 30 days. See \"Patient Info\" tab in inbasket, or \"Choose Columns\" in Meds & Orders section of the refill encounter.              Passed - Medication is active on med list           lisinopril (ZESTRIL) 10 MG tablet 30 tablet 1     Sig: Take 1 tablet (10 mg) by mouth daily       ACE Inhibitors (Including Combos) Protocol Passed - 3/30/2021  4:17 PM        Passed - Blood pressure under 140/90 in past 12 months     BP Readings from Last 3 Encounters:   03/11/21 105/72   09/30/20 104/68   09/17/20 94/72                 Passed - Recent (12 mo) or future (30 days) visit within the authorizing provider's " "specialty     Patient has had an office visit with the authorizing provider or a provider within the authorizing providers department within the previous 12 mos or has a future within next 30 days. See \"Patient Info\" tab in inbasket, or \"Choose Columns\" in Meds & Orders section of the refill encounter.              Passed - Medication is active on med list        Passed - Patient is age 18 or older        Passed - Normal serum creatinine on file in past 12 months     Recent Labs   Lab Test 03/11/21  1050   CR 1.18       Ok to refill medication if creatinine is low          Passed - Normal serum potassium on file in past 12 months     Recent Labs   Lab Test 03/11/21  1050   POTASSIUM 4.3                levothyroxine (SYNTHROID/LEVOTHROID) 150 MCG tablet 30 tablet 1     Sig: Take 1 tablet (150 mcg) by mouth daily       Thyroid Protocol Passed - 3/30/2021  4:17 PM        Passed - Patient is 12 years or older        Passed - Recent (12 mo) or future (30 days) visit within the authorizing provider's specialty     Patient has had an office visit with the authorizing provider or a provider within the authorizing providers department within the previous 12 mos or has a future within next 30 days. See \"Patient Info\" tab in inNERIet, or \"Choose Columns\" in Meds & Orders section of the refill encounter.              Passed - Medication is active on med list        Passed - Normal TSH on file in past 12 months     Recent Labs   Lab Test 03/11/21  1050   TSH 2.50                   "

## 2021-04-01 NOTE — TELEPHONE ENCOUNTER
Vitamin D & atenolol  Prescription approved per [facility] Refill Protocol.    Lisinopril & levothyroxine  Patient is requesting refills from 3/3/21 be sent to another pharmacy  Prescription approved per [facility] Refill Protocol.

## 2021-04-21 ENCOUNTER — MYC MEDICAL ADVICE (OUTPATIENT)
Dept: INTERNAL MEDICINE | Facility: CLINIC | Age: 61
End: 2021-04-21

## 2021-04-22 ENCOUNTER — IMMUNIZATION (OUTPATIENT)
Dept: NURSING | Facility: CLINIC | Age: 61
End: 2021-04-22
Payer: COMMERCIAL

## 2021-04-22 PROCEDURE — 0011A PR COVID VAC MODERNA 100 MCG/0.5 ML IM: CPT

## 2021-04-22 PROCEDURE — 91301 PR COVID VAC MODERNA 100 MCG/0.5 ML IM: CPT

## 2021-05-15 ENCOUNTER — HEALTH MAINTENANCE LETTER (OUTPATIENT)
Age: 61
End: 2021-05-15

## 2021-05-20 ENCOUNTER — IMMUNIZATION (OUTPATIENT)
Dept: NURSING | Facility: CLINIC | Age: 61
End: 2021-05-20
Attending: INTERNAL MEDICINE
Payer: COMMERCIAL

## 2021-05-20 PROCEDURE — 91301 PR COVID VAC MODERNA 100 MCG/0.5 ML IM: CPT

## 2021-05-20 PROCEDURE — 0012A PR COVID VAC MODERNA 100 MCG/0.5 ML IM: CPT

## 2021-05-25 DIAGNOSIS — I10 BENIGN ESSENTIAL HYPERTENSION: ICD-10-CM

## 2021-05-25 DIAGNOSIS — E03.9 ACQUIRED HYPOTHYROIDISM: ICD-10-CM

## 2021-05-25 RX ORDER — LEVOTHYROXINE SODIUM 150 UG/1
TABLET ORAL
Qty: 90 TABLET | Refills: 2 | Status: SHIPPED | OUTPATIENT
Start: 2021-05-25 | End: 2022-02-23

## 2021-05-25 RX ORDER — LISINOPRIL 10 MG/1
TABLET ORAL
Qty: 90 TABLET | Refills: 2 | Status: SHIPPED | OUTPATIENT
Start: 2021-05-25 | End: 2022-02-23

## 2021-06-01 ASSESSMENT — ENCOUNTER SYMPTOMS
CHILLS: 0
ABDOMINAL PAIN: 0
HEMATOCHEZIA: 0
FREQUENCY: 0
PARESTHESIAS: 0
NAUSEA: 0
DYSURIA: 0
HEARTBURN: 0
HEMATURIA: 0
CONSTIPATION: 0
NERVOUS/ANXIOUS: 0
HEADACHES: 0
EYE PAIN: 0
SORE THROAT: 0
MYALGIAS: 0
DIARRHEA: 0
FEVER: 0
SHORTNESS OF BREATH: 0
COUGH: 0
ARTHRALGIAS: 0
PALPITATIONS: 0
DIZZINESS: 0
JOINT SWELLING: 0
WEAKNESS: 0

## 2021-06-04 ENCOUNTER — OFFICE VISIT (OUTPATIENT)
Dept: INTERNAL MEDICINE | Facility: CLINIC | Age: 61
End: 2021-06-04
Payer: COMMERCIAL

## 2021-06-04 VITALS
WEIGHT: 225.8 LBS | DIASTOLIC BLOOD PRESSURE: 73 MMHG | TEMPERATURE: 98 F | SYSTOLIC BLOOD PRESSURE: 117 MMHG | RESPIRATION RATE: 18 BRPM | HEIGHT: 67 IN | BODY MASS INDEX: 35.44 KG/M2 | HEART RATE: 58 BPM | OXYGEN SATURATION: 99 %

## 2021-06-04 DIAGNOSIS — Z00.00 ENCOUNTER FOR ROUTINE ADULT HEALTH EXAMINATION WITHOUT ABNORMAL FINDINGS: Primary | ICD-10-CM

## 2021-06-04 DIAGNOSIS — Z12.5 SCREENING FOR PROSTATE CANCER: ICD-10-CM

## 2021-06-04 DIAGNOSIS — E66.01 MORBID OBESITY (H): ICD-10-CM

## 2021-06-04 PROCEDURE — 99396 PREV VISIT EST AGE 40-64: CPT | Performed by: INTERNAL MEDICINE

## 2021-06-04 PROCEDURE — 36415 COLL VENOUS BLD VENIPUNCTURE: CPT | Performed by: INTERNAL MEDICINE

## 2021-06-04 PROCEDURE — G0103 PSA SCREENING: HCPCS | Performed by: INTERNAL MEDICINE

## 2021-06-04 ASSESSMENT — ENCOUNTER SYMPTOMS
DYSURIA: 0
HEARTBURN: 0
HEMATOCHEZIA: 0
SHORTNESS OF BREATH: 0
SORE THROAT: 0
PARESTHESIAS: 0
HEMATURIA: 0
PALPITATIONS: 0
CHILLS: 0
HEADACHES: 0
JOINT SWELLING: 0
DIZZINESS: 0
COUGH: 0
EYE PAIN: 0
WEAKNESS: 0
ARTHRALGIAS: 0
FREQUENCY: 0
NAUSEA: 0
CONSTIPATION: 0
MYALGIAS: 0
FEVER: 0
ABDOMINAL PAIN: 0
DIARRHEA: 0
NERVOUS/ANXIOUS: 0

## 2021-06-04 ASSESSMENT — MIFFLIN-ST. JEOR: SCORE: 1787.85

## 2021-06-04 NOTE — NURSING NOTE
"/73 (BP Location: Right arm, Patient Position: Sitting, Cuff Size: Adult Regular)   Pulse 58   Temp 98  F (36.7  C) (Oral)   Resp 18   Ht 1.702 m (5' 7\")   Wt 102.4 kg (225 lb 12.8 oz)   SpO2 99%   BMI 35.37 kg/m      "
independent

## 2021-06-04 NOTE — PROGRESS NOTES
SUBJECTIVE:   CC: Grant Shoemaker is an 60 year old male who presents for preventative health visit.       Patient has been advised of split billing requirements and indicates understanding: Yes  Healthy Habits:     Getting at least 3 servings of Calcium per day:  NO    Bi-annual eye exam:  Yes    Dental care twice a year:  Yes    Sleep apnea or symptoms of sleep apnea:  Sleep apnea    Diet:  Regular (no restrictions)    Frequency of exercise:  None    Taking medications regularly:  Yes    Medication side effects:  Not applicable and None    PHQ-2 Total Score: 2    Additional concerns today:  No    Ability to successfully perform activities of daily living: Yes, no assistance needed  Home safety:  none identified   Hearing impairment: None            Today's PHQ-2 Score:   PHQ-2 ( 1999 [employer]) 6/1/2021   Q1: Little interest or pleasure in doing things 1   Q2: Feeling down, depressed or hopeless 1   PHQ-2 Score 2   Q1: Little interest or pleasure in doing things Several days   Q2: Feeling down, depressed or hopeless Several days   PHQ-2 Score 2       Abuse: Current or Past(Physical, Sexual or Emotional)- No  Do you feel safe in your environment? Yes    Have you ever done Advance Care Planning? (For example, a Health Directive, POLST, or a discussion with a medical provider or your loved ones about your wishes): No, advance care planning information given to patient to review.  Patient plans to discuss their wishes with loved ones or provider.      Social History     Tobacco Use     Smoking status: Never Smoker     Smokeless tobacco: Never Used   Substance Use Topics     Alcohol use: Yes     Frequency: Monthly or less     If you drink alcohol do you typically have >3 drinks per day or >7 drinks per week? No    Alcohol Use 6/1/2021   Prescreen: >3 drinks/day or >7 drinks/week? No   No flowsheet data found.    Last PSA: No results found for: PSA    Reviewed orders with patient. Reviewed health maintenance and updated  orders accordingly - Yes      Reviewed and updated as needed this visit by clinical staff               Patient Active Problem List   Diagnosis     ANN (obstructive sleep apnea)     Hyperlipidemia LDL goal <130     Benign essential hypertension     Acquired hypothyroidism     Morbid obesity (H)     Current Outpatient Medications   Medication Sig Dispense Refill     ASPIRIN ADULT LOW STRENGTH 81 MG EC tablet Take 1 tablet by mouth daily       atenolol (TENORMIN) 50 MG tablet Take 1 tablet (50 mg) by mouth daily 90 tablet 3     calcium carbonate 750 MG CHEW Take 1,500 mg by mouth daily       Cholecalciferol (VITAMIN D3) 50 MCG (2000 UT) CAPS TAKE TWO CAPSULES BY MOUTH DAILY 180 capsule 3     levothyroxine (SYNTHROID/LEVOTHROID) 150 MCG tablet TAKE 1 TABLET(150 MCG) BY MOUTH DAILY 90 tablet 2     lisinopril (ZESTRIL) 10 MG tablet TAKE 1 TABLET(10 MG) BY MOUTH DAILY 90 tablet 2     triamterene-HCTZ (MAXZIDE-25) 37.5-25 MG tablet Take 1 tablet by mouth daily 90 tablet 0        Reviewed and updated as needed this visit by Provider                    Review of Systems   Constitutional: Negative for chills and fever.   HENT: Negative for congestion, ear pain, hearing loss and sore throat.    Eyes: Negative for pain and visual disturbance.   Respiratory: Negative for cough and shortness of breath.    Cardiovascular: Negative for chest pain, palpitations and peripheral edema.   Gastrointestinal: Negative for abdominal pain, constipation, diarrhea, heartburn, hematochezia and nausea.   Genitourinary: Negative for discharge, dysuria, frequency, genital sores, hematuria, impotence and urgency.   Musculoskeletal: Negative for arthralgias, joint swelling and myalgias.   Skin: Negative for rash.   Neurological: Negative for dizziness, weakness, headaches and paresthesias.   Psychiatric/Behavioral: Negative for mood changes. The patient is not nervous/anxious.          OBJECTIVE:   /73 (BP Location: Right arm, Patient  "Position: Sitting, Cuff Size: Adult Regular)   Pulse 58   Temp 98  F (36.7  C) (Oral)   Resp 18   Ht 1.702 m (5' 7\")   Wt 102.4 kg (225 lb 12.8 oz)   SpO2 99%   BMI 35.37 kg/m      Physical Exam  HEENT: extraocular movements are intact, pupils equal and reactive to light and accommodation, TMs clear  NECK: Neck supple. No adenopathy. Thyroid symmetric, normal size,, Carotids without bruits.  PULMONARY: clear to auscultation  CARDIAC: S1, S2 normal, no murmur, rub or gallop, regular rate and rhythm  PULSES: 2/2 throughout  ABDOMINAL: Soft, nontender.  Normal bowel sounds.  No hepatosplenomegaly or abnormal masses  BREAST: male breasts are normal without masses  RECTAL: Normal sphincter tone, no masses, prostate mildly enlarged, without nodules  REFLEXES: 2+ throughout     Lab Results   Component Value Date    HDL 53 03/11/2021     03/11/2021    CHOL 220 03/11/2021    TRIG 124 03/11/2021        Lab Results   Component Value Date    TSH 2.50 03/11/2021    TSH 3.13 02/04/2020              ASSESSMENT/PLAN:   1. Encounter for routine adult health examination without abnormal findings  Up to date     2. Morbid obesity (H)  Work on weight loss    3. Benign essential hypertension  Controlled, conitnue med    4. Acquired hypothyroidism  Stable, continue med    5. Hyperlipidemia LDL goal <130  Controlled, continue med    6. Screening for prostate cancer    - PSA, screen    Patient has been advised of split billing requirements and indicates understanding: Yes  COUNSELING:   Reviewed preventive health counseling, as reflected in patient instructions    Estimated body mass index is 35.3 kg/m  as calculated from the following:    Height as of 3/11/21: 1.702 m (5' 7\").    Weight as of 3/11/21: 102.2 kg (225 lb 6.4 oz).     Weight management plan: Discussed healthy diet and exercise guidelines    He reports that he has never smoked. He has never used smokeless tobacco.      Counseling Resources:  ATP IV " Guidelines  Pooled Cohorts Equation Calculator  FRAX Risk Assessment  ICSI Preventive Guidelines  Dietary Guidelines for Americans, 2010  [employer] MyPlate  ASA Prophylaxis  Lung CA Screening    Sheridan Mistry MD  [facility]

## 2021-06-05 DIAGNOSIS — I10 BENIGN ESSENTIAL HYPERTENSION: ICD-10-CM

## 2021-06-05 LAB — PSA SERPL-ACNC: 1.21 UG/L (ref 0–4)

## 2021-06-05 NOTE — TELEPHONE ENCOUNTER
Reason for call:  Medication   If this is a refill request, has the caller requested the refill from the pharmacy already? Yes  Will the patient be using a [facility]? No  Name of the pharmacy and phone number for the current request: [facility]  [id number] [address] AT [facility] [place] [facility] [address]    Name of the medication requested: triamterene-HCTZ (MAXZIDE-25) 37.5-25 MG tablet    Other request: no    Phone number to reach patient:  Cell number on file:    Telephone Information:   Mobile [phone]       Best Time:  any    Can we leave a detailed message on this number?  Not Applicable    Travel screening: Not Applicable

## 2021-06-08 RX ORDER — TRIAMTERENE/HYDROCHLOROTHIAZID 37.5-25 MG
1 TABLET ORAL DAILY
Qty: 90 TABLET | Refills: 2 | Status: SHIPPED | OUTPATIENT
Start: 2021-06-08 | End: 2022-03-07

## 2021-07-02 ENCOUNTER — MYC MEDICAL ADVICE (OUTPATIENT)
Dept: INTERNAL MEDICINE | Facility: CLINIC | Age: 61
End: 2021-07-02

## 2021-07-02 DIAGNOSIS — G47.33 OSA (OBSTRUCTIVE SLEEP APNEA): Primary | ICD-10-CM

## 2021-08-06 ASSESSMENT — SLEEP AND FATIGUE QUESTIONNAIRES
HOW LIKELY ARE YOU TO NOD OFF OR FALL ASLEEP WHILE SITTING INACTIVE IN A PUBLIC PLACE: WOULD NEVER DOZE
HOW LIKELY ARE YOU TO NOD OFF OR FALL ASLEEP WHILE SITTING AND TALKING TO SOMEONE: WOULD NEVER DOZE
HOW LIKELY ARE YOU TO NOD OFF OR FALL ASLEEP WHILE LYING DOWN TO REST IN THE AFTERNOON WHEN CIRCUMSTANCES PERMIT: HIGH CHANCE OF DOZING
HOW LIKELY ARE YOU TO NOD OFF OR FALL ASLEEP WHILE SITTING AND READING: WOULD NEVER DOZE
HOW LIKELY ARE YOU TO NOD OFF OR FALL ASLEEP WHILE WATCHING TV: SLIGHT CHANCE OF DOZING
HOW LIKELY ARE YOU TO NOD OFF OR FALL ASLEEP IN A CAR, WHILE STOPPED FOR A FEW MINUTES IN TRAFFIC: WOULD NEVER DOZE
HOW LIKELY ARE YOU TO NOD OFF OR FALL ASLEEP WHILE SITTING QUIETLY AFTER LUNCH WITHOUT ALCOHOL: WOULD NEVER DOZE
HOW LIKELY ARE YOU TO NOD OFF OR FALL ASLEEP WHEN YOU ARE A PASSENGER IN A CAR FOR AN HOUR WITHOUT A BREAK: WOULD NEVER DOZE

## 2021-08-10 NOTE — PATIENT INSTRUCTIONS
Your BMI is There is no height or weight on file to calculate BMI.  Weight management is a personal decision.  If you are interested in exploring weight loss strategies, the following discussion covers the approaches that may be successful. Body mass index (BMI) is one way to tell whether you are at a healthy weight, overweight, or obese. It measures your weight in relation to your height.  A BMI of 18.5 to 24.9 is in the healthy range. A person with a BMI of 25 to 29.9 is considered overweight, and someone with a BMI of 30 or greater is considered obese. More than two-thirds of American adults are considered overweight or obese.  Being overweight or obese increases the risk for further weight gain. Excess weight may lead to heart disease and diabetes.  Creating and following plans for healthy eating and physical activity may help you improve your health.  Weight control is part of healthy lifestyle and includes exercise, emotional health, and healthy eating habits. Careful eating habits lifelong are the mainstay of weight control. Though there are significant health benefits from weight loss, long-term weight loss with diet alone may be very difficult to achieve- studies show long-term success with dietary management in less than 10% of people. Attaining a healthy weight may be especially difficult to achieve in those with severe obesity. In some cases, medications, devices and surgical management might be considered.  What can you do?  If you are overweight or obese and are interested in methods for weight loss, you should discuss this with your provider.     Consider reducing daily calorie intake by 500 calories.     Keep a food journal.     Avoiding skipping meals, consider cutting portions instead.    Diet combined with exercise helps maintain muscle while optimizing fat loss. Strength training is particularly important for building and maintaining muscle mass. Exercise helps reduce stress, increase energy,  and improves fitness. Increasing exercise without diet control, however, may not burn enough calories to loose weight.       Start walking three days a week 10-20 minutes at a time    Work towards walking thirty minutes five days a week     Eventually, increase the speed of your walking for 1-2 minutes at time    In addition, we recommend that you review healthy lifestyles and methods for weight loss available through the National Institutes of Health patient information sites:  http://win.niddk.nih.gov/publications/index.htm    And look into health and wellness programs that may be available through your health insurance provider, employer, local community center, or eleni club.    Weight management plan: Patient was referred to their PCP to discuss a diet and exercise plan.        Your sleep apnea treatment may be affected by device recall    Our records show that you may have a Jim Respironics CPAP for the treatment of sleep apnea. Many of these devices have been recalled* by the  for replacement. [facility] recommends:     1) If you are using a Resmed device, continue using the device.  2) If you have a Jim Respironics device, register your device for confirmation of type of device and repair of the device at https://[email or web address]/healthcare/e/sleep/communications/src-update -if you cannot use link, call [phone].  The website will assist you in obtaining the serial number for registration.   3) If you are using a Jim Respironics CPAP or Bilevel PAP device and you do not have immediate breathing, driving or cardiovascular risks without the device, consider stopping use of the device after verification that is has been recalled. Discuss this decision with your medical provider if you are uncertain about your medical risks.  4) If you are not using Respironics CPAP but are using a Respironics advanced device for breathing support (AVAPS, ASV, Bilevel PAP), continue  using the device and review 5 and 6 below).     5) If you continue the device, do not include ozone generating  connected to PAP devices.  6) Bacterial filters to reduce exposure to particulates are sometimes cumbersome to use and are not currently recommended by the .    ?       You may also choose discuss with your provider alternative approaches to treatment.      *[employer] is voluntarily recalling the below devices due to two (2) issues related to the polyester-based polyurethane (PE-PUR) sound abatement foam used in Jim Continuous and Non-Continuous Ventilators: 1) PE-PUR foam may degrade into particles which may enter the device's the air pathway and be ingested or inhaled by the user, and 2) the PE-PUR foam may off-gas certain chemicals. The foam degradation may be exacerbated by use of unapproved cleaning methods, such as ozone (see FDA safety communication on use of Ozone ), and off-gassing may occur during initial operation and may possibly continue throughout the device's useful life.   These issues can result in serious injury which can be life-threatening, cause permanent impairment, and/or require medical intervention to preclude permanent impairment. To date, [employer] [employer] has received several complaints regarding the presence of black debris/particles within the airpath circuit (extending from the device outlet, humidifier, tubing, and mask). Jim also has received reports of headache, upper airway irritation, cough, chest pressure and sinus infection. The potential risks of particulate exposure include: Irritation (skin, eye, and respiratory tract), inflammatory response, headache, asthma, adverse effects to other organs (e.g. kidneys and liver) and toxic carcinogenic affects. The potential risks of chemical exposure due to off-gassing include: headache/dizziness, irritation (eyes, nose, respiratory tract, skin), hypersensitivity, nausea/vomiting,  toxic and carcinogenic effects. There have been no reports of death as a result of these issues.    Actions to be taken:  Discontinue the use of your device.  Do not continue to use ozone  with the device.     [place] affected devices on the recall website, www.[employer]/SRC-update    i. The website provides current information on the status of the recall and how  to receive permanent corrective action to address the two issues.    ii. The website also provides instructions on how to locate an affected device  Serial Number and will guide users through the registration process.    iii. In the [place], call [phone] Service Hotline if you cannot visit the website

## 2021-08-10 NOTE — PROGRESS NOTES
"Grant Shoemaker is a 61 year old who is being evaluated via a billable video visit.      How would you like to obtain your AVS? MyChart  If the video visit is dropped, the invitation should be resent by: Send to e-mail at: [email or web address]  Will anyone else be joining your video visit? No    Does patient have any form of state insurance? Yes    Do you have wifi? Yes  Do you have a smart phone? Yes  Can you download an cynthia on your phone comfortably with out assistance? Yes  Can you watch a MDJunctionube video? Yes          Ese Woo MA      Video-Visit Details    Type of service:  Video Visit    Video Start Time: 08/11/2021 02:10 pm  Stop: 08/11/2021 02:55 pm    Originating Location (pt. Location): Home    Distant Location (provider location):  [facility] sleep clinic [place]  Platform used for Video Visit: [facility]  Sleep Consultation Note     Date on this visit: 8/11/2021    Grant Shoemaker is sent by Sheridan Mistry for a sleep consultation regarding concerns about CPAP device recall    Primary Physician: Sheridan Mistry     Chief complaint: Concerns regarding CPAP device recall    Grant Shoemaker 61 year old with PMH of obesity, ANN, hypertension, hyperlipidemia, hypothyroidism, who presents to sleep clinic for a video visit today with concerns about the Jim Respironics CPAP recall.    He was diagnosed with ANN during sleep study obtained through [facility] 25 years ago.     Sleep study date: 08/09/1994 baseline @ [facility] . AHI/ ERIKA: 7.4 hr    Used MAD initially but eventually switched to CPAP.  He obtained the initial CPAP in approximately 1997 which is a \"Remstar Plus LX\" device and used the same device regularly until 2018.  He received his current CPAP device, which is a replacement device about 3 years ago. His DME vendor is [facility]. He learned about the recall issue with CPAP device, when he called [facility] to " order new mask and filters for his C-PAP. He discontinued the Respironics CPAP device use, and got the CPAP registered with Respironics. He started using his old CPAP device. He uses mirage nasal mask.  He reports that the device is louder but working okay.  Unlike the Respironics CPAP, his old CPAP is set at fixed pressure settings.   He wants to know the next steps about sleep apnea treatment.    DOWNLOADABLE COMPLIANCE DATA: (This is from Respironics CPAP).  Pressure settings on the auto CPAP range between        cm water.  From 6/2/2021 through 7/1/2021 reveals that he used the device for 30 out of 30 days with an averag use of 9.16 hours on the days used.  No air leak. Residual AHI was 6.8 per hour. Mean pressure settings: 8.7; peak average pressure: 13.3 and device pressure <=90% of time: 11.9 cm water.  His old CPAP that he recently started using does not have the compliance download capability.    SLEEP SCALES:  Patient's [place] Sleepiness score 4/24   Insomnia severity index: 7     Allergies:    No Known Allergies    Medications:    Current Outpatient Medications   Medication Sig Dispense Refill     ASPIRIN ADULT LOW STRENGTH 81 MG EC tablet Take 1 tablet by mouth daily       atenolol (TENORMIN) 50 MG tablet Take 1 tablet (50 mg) by mouth daily 90 tablet 3     calcium carbonate 750 MG CHEW Take 1,500 mg by mouth daily       Cholecalciferol (VITAMIN D3) 50 MCG (2000 UT) CAPS TAKE TWO CAPSULES BY MOUTH DAILY 180 capsule 3     levothyroxine (SYNTHROID/LEVOTHROID) 150 MCG tablet TAKE 1 TABLET(150 MCG) BY MOUTH DAILY 90 tablet 2     lisinopril (ZESTRIL) 10 MG tablet TAKE 1 TABLET(10 MG) BY MOUTH DAILY 90 tablet 2     triamterene-HCTZ (MAXZIDE-25) 37.5-25 MG tablet Take 1 tablet by mouth daily 90 tablet 2       Problem List:  Patient Active Problem List    Diagnosis Date Noted     Morbid obesity (H) 03/17/2021     Priority: Medium     ANN (obstructive sleep apnea) 03/17/2020     Priority: Medium     On  CPAP  CPAP Style: [employer] Respironics Dreamstation 500   Asset/machine and modem s/n #: [id number]  Initial or replacement: Replacement  Setup location: [place]  Date of setup: 5/24/2018  Ordering Provider: Virgen RIZVI   Pressure: Auto 5-20cm h2o   Sleep study date: 08/09/1994 baseline @ [facility]   AHI/ ERIKA: 7.4 hr         Hyperlipidemia LDL goal <130 03/17/2020     Priority: Medium     Benign essential hypertension 03/17/2020     Priority: Medium     Acquired hypothyroidism 03/17/2020     Priority: Medium        Past Medical/Surgical History:  Past Medical History:   Diagnosis Date     Adenomatous polyp of colon      Hyperlipidemia      Hypertension      Hypothyroidism      ANN (obstructive sleep apnea)      Past Surgical History:   Procedure Laterality Date     C CERV ARTIFIC DISKECTOMY       C LAMINOTOMY, SINGLE CERVICAL       RELEASE CARPAL TUNNEL       RELEASE TRIGGER FINGER      x6       Social History:  Social History     Socioeconomic History     Marital status:      Spouse name: Not on file     Number of children: Not on file     Years of education: Not on file     Highest education level: Not on file   Occupational History     Not on file   Tobacco Use     Smoking status: Never Smoker     Smokeless tobacco: Never Used   Substance and Sexual Activity     Alcohol use: Yes     Drug use: Never     Sexual activity: Not Currently   Other Topics Concern     Not on file   Social History Narrative     Not on file     Social Determinants of Health     Financial Resource Strain:      Difficulty of Paying Living Expenses:    Food Insecurity:      Worried About Running Out of Food in the Last Year:      Ran Out of Food in the Last Year:    Transportation Needs:      Lack of Transportation (Medical):      Lack of Transportation (Non-Medical):    Physical Activity:      Days of Exercise per Week:      Minutes of Exercise per Session:    Stress:       Feeling of Stress :    Social Connections:      Frequency of Communication with Friends and Family:      Frequency of Social Gatherings with Friends and Family:      Attends Mosque Services:      Active Member of Clubs or Organizations:      Attends Club or Organization Meetings:      Marital Status:    Intimate Partner Violence:      Fear of Current or Ex-Partner:      Emotionally Abused:      Physically Abused:      Sexually Abused:        Family History:  Family History   Problem Relation Age of Onset     Pancreatic Cancer Mother      Esophageal Cancer Father      Thyroid Disease Sister        Review of Systems:  Answers for HPI/ROS submitted by the patient on 8/6/2021  General Symptoms: No  Skin Symptoms: No  HENT Symptoms: No  EYE SYMPTOMS: No  HEART SYMPTOMS: No  LUNG SYMPTOMS: No  INTESTINAL SYMPTOMS: No  URINARY SYMPTOMS: No  REPRODUCTIVE SYMPTOMS: No  SKELETAL SYMPTOMS: No  BLOOD SYMPTOMS: No  NERVOUS SYSTEM SYMPTOMS: No  MENTAL HEALTH SYMPTOMS: No      Physical Examination:  Vitals: There were no vitals taken for this visit.  BMI= There is no height or weight on file to calculate BMI.    [place] Total Score 8/6/2021   Total score - [place] 4      General: No apparent distress, appropriately groomed  Head: Normocephalic, atraumatic  Chest: No cough, no audible wheezing, able to talk in full sentences  Skin: no rash  Psych: coherent speech, normal rate and volume, able to articulate logical thoughts, able   to abstract reason, no tangential thoughts, no hallucinations   or delusions  His  affect is normal  Neuro:  Mental status: Alert and  Oriented X 3  Speech: normal     OTHER TESTS/LABS:  Last Comprehensive Metabolic Panel:  Sodium   Date Value Ref Range Status   03/11/2021 135 133 - 144 mmol/L Final     Potassium   Date Value Ref Range Status   03/11/2021 4.3 3.4 - 5.3 mmol/L Final     Chloride   Date Value Ref Range Status   03/11/2021 103 94 - 109 mmol/L Final     Carbon Dioxide   Date Value Ref  Range Status   03/11/2021 32 20 - 32 mmol/L Final     Anion Gap   Date Value Ref Range Status   03/11/2021 <1 (L) 3 - 14 mmol/L Final     Glucose   Date Value Ref Range Status   03/11/2021 100 (H) 70 - 99 mg/dL Final     Comment:     Fasting specimen     Urea Nitrogen   Date Value Ref Range Status   03/11/2021 21 7 - 30 mg/dL Final     Creatinine   Date Value Ref Range Status   03/11/2021 1.18 0.66 - 1.25 mg/dL Final     GFR Estimate   Date Value Ref Range Status   03/11/2021 66 >60 mL/min/[1.73_m2] Final     Comment:     Non  GFR Calc  Starting 12/18/2018, serum creatinine based estimated GFR (eGFR) will be   calculated using the Chronic Kidney Disease Epidemiology Collaboration   (CKD-EPI) equation.       Calcium   Date Value Ref Range Status   03/11/2021 9.2 8.5 - 10.1 mg/dL Final     Impression/Plan:  Previously diagnosed obstructive sleep apnea, treated with CPAP since approximately 1997.    The replacement CPAP equipment that he obtained 3 years ago through [employer] is a Jim Respironics device that has been recalled.   He discontinued the recalled device  and resumed the use of his old CPAP which is a REM STAR device(pressure settings unknown). Compliance download cannot be obtained from his old CPAP.  Provided patient with the information regarding the Jim Respironics device recall and the next steps.  I discussed with the patient the option of resuming the use of the Respironics auto CPAP device,  if he is interested and using  the CPAP bacterial filters(that he can obtain through [employer] and also through online resources such as [employer]).  Recommend narrowing the pressure settings on the auto CPAP range between 9-14 cmH2O and plan to review the compliance download in 1 month with the revised pressure settings.  We also discussed the option of providing prescription for replacement auto CPAP but will check with [employer] if it is feasible.  "    Obesity: We discussed weight management with healthy diet, and exercise.    Patient was strongly advised to avoid driving, operating any heavy machinery or other hazardous situations while drowsy or sleepy.  Patient was counseled on the importance of driving while alert, to pull over if drowsy, or nap before getting into the vehicle if sleepy.      CC: Sheridan Mistry    The above note was dictated using voice recognition software. Although reviewed after completion, some word and grammatical error may remain . Please contact the author for any clarifications.    \"I spent a total of 45 minutes with Grant Shoemaker during today's video visit. Over 50% of this time was spent counseling the patient and  coordinating care regarding  ANN, CPAP device recall, ANN treatment, weight management , going over PAP download and chart review, including documentation and further activities as noted above.\"      Miles Real MD  [facility] [address] [id number] [phone]  Dept: [phone]                      "

## 2021-08-11 ENCOUNTER — VIRTUAL VISIT (OUTPATIENT)
Dept: SLEEP MEDICINE | Facility: CLINIC | Age: 61
End: 2021-08-11
Attending: INTERNAL MEDICINE
Payer: COMMERCIAL

## 2021-08-11 DIAGNOSIS — G47.33 OSA (OBSTRUCTIVE SLEEP APNEA): ICD-10-CM

## 2021-08-11 PROCEDURE — 99204 OFFICE O/P NEW MOD 45 MIN: CPT | Mod: 95 | Performed by: INTERNAL MEDICINE

## 2021-09-04 ENCOUNTER — HEALTH MAINTENANCE LETTER (OUTPATIENT)
Age: 61
End: 2021-09-04

## 2021-09-24 ENCOUNTER — TELEPHONE (OUTPATIENT)
Dept: SLEEP MEDICINE | Facility: CLINIC | Age: 61
End: 2021-09-24

## 2021-09-24 DIAGNOSIS — G47.33 OSA ON CPAP: Primary | ICD-10-CM

## 2021-09-24 NOTE — TELEPHONE ENCOUNTER
Provider reached out to me to discuss patient and how to get him a new CPAP machine. ( review previous [email or web address] messages) Patient had PSG in 1994 and was given CPAP through [employer]. On 05/24/2018 he received his replacement machine. This machine is now recalled by . Patient does not mind using his older machine but does need new supplies. He will not use the recalled device for health reasons.     Spoke with [employer] who stated at first they would not be able to replace his machine he would need to wait for the recall to replace his CPAP. They then stated if he was re-studied he would qualify to get a replacement. This was confusing so I reached out to our DME company and they informed me he WOULD NOT qualify if re-studies. He can purchase online if he does not want to wait. As a courtesy to our sleep patient's he can receive 2 free bacteria filters from [employer] he just needs to go to the  DME clinic and they will give them to him.     Called and let him know this and he would like to conintue using his older machine and asked for supplies be sent to [employer]. Order pended and routed to provider.     Leyla Panchal CMA on 9/24/2021 at 3:17 PM

## 2021-09-24 NOTE — TELEPHONE ENCOUNTER
Received order for a replacement CPAP. Called to let patient know he will be eligible for a replacement 5/23/23. Patient said he wants to use his old Respironics Remstar Plus Lx CPAP machine and have verified with [employer]ics that it's not under recall until he can get a replacement from [employer]ics. I let patient know that we do not have filters or a water chamber for his Respironics Remstar Plus Lx CPAP but he should be able to use standard tubing and his current mask with it. Patient was instructed to bring his old machine to the [place] showroom to verify if standard tubing is compatible. He understands that we won't be able to physically put the tubing on his machine due to COVID. Sent email to  DME coordinators to let them know patient will stop by today or next week.

## 2021-10-07 ENCOUNTER — VIRTUAL VISIT (OUTPATIENT)
Dept: PSYCHOLOGY | Facility: CLINIC | Age: 61
End: 2021-10-07
Payer: COMMERCIAL

## 2021-10-07 DIAGNOSIS — F32.0 CURRENT MILD EPISODE OF MAJOR DEPRESSIVE DISORDER WITHOUT PRIOR EPISODE (H): Primary | ICD-10-CM

## 2021-10-07 PROCEDURE — 90834 PSYTX W PT 45 MINUTES: CPT | Mod: 95 | Performed by: SOCIAL WORKER

## 2021-10-07 NOTE — PROGRESS NOTES
Progress Note - Initial Visit    Client Name:  Grant Shoemaker Date: 10.7.21         Service Type: Individual     Visit Start Time: 1230pm Visit End Time: 120pm    Visit #: 1    Attendees: Client attended alone    Service Modality:  Video Visit:      Provider verified identity through the following two step process.  Patient provided:  Patient photo    Telemedicine Visit: The patient's condition can be safely assessed and treated via synchronous audio and visual telemedicine encounter.      Reason for Telemedicine Visit: Patient has requested telehealth visit    Originating Site (Patient Location): Patient's home    Distant Site (Provider Location): Provider Remote Setting- Home Office    Consent:  The patient/guardian has verbally consented to: the potential risks and benefits of telemedicine (video visit) versus in person care; bill my insurance or make self-payment for services provided; and responsibility for payment of non-covered services.     Patient would like the video invitation sent by:  My Chart    Mode of Communication:  Video Conference via P[employer]    As the provider I attest to compliance with applicable laws and regulations related to telemedicine.       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 Processed how pt was fired based out of a discrimination claim. Processed how pt had been to court acting as his own  to claim unemployment benefits. Discussed pt's observations that his old  did not like him was unfair and similar was the . Processed pt's underlying worries that this may be related to symptoms of paranoia which pt is interested in having assessed.       ASSESSMENT:  Mental Status Assessment:  Appearance:   Appropriate   Eye Contact:   Good   Psychomotor Behavior: Normal   Attitude:   Cooperative    Orientation:   All  Speech   Rate / Production: Normal/ Responsive   Volume:  Normal   Mood:    Anxious   Affect:    Appropriate   Thought Content:  Clear   Thought Form:  Coherent   Insight:    Fair       Safety Issues and Plan for Safety and Risk Management:     [place] Suicide Severity Rating Scale (Lifetime/Recent)  [place] Suicide Severity Rating (Lifetime/Recent) 10/8/2021   1. Wish to be Dead (Lifetime) No   1. Wish to be Dead (Recent) No   2. Non-Specific Active Suicidal Thoughts (Lifetime) No   2. Non-Specific Active Suicidal Thoughts (Recent) No   3. Active Suicidal Ideation with any Methods (Not Plan) Without Intent to Act (Lifetime) No   3. Active Suicidal Ideation with any Methods (Not Plan) Without Intent to Act (Recent) No   4. Active Suicidal Ideation with Some Intent to Act, Without Specific Plan (Lifetime) No   4. Active Suicidal Ideation with Some Intent to Act, Without Specific Plan (Recent) No   5. Active Suicidal Ideation with Specific Plan and Intent (Lifetime) No   5. Active Suicidal Ideation with Specific Plan and Intent (Recent) No   Most Severe Ideation Rating (Lifetime) NA   Most Severe Ideation Description (Lifetime) n   Frequency (Lifetime) NA   Duration (Lifetime) NA   Controllability (Lifetime) NA   Protective Factors  (Lifetime) NA   Reasons for Ideation (Lifetime) NA   Most Severe Ideation Rating (Past Month) NA   Most Severe Ideation Description (Past Month) nn   Frequency (Past Month) NA   Duration (Past Month) NA   Controllability (Past Month) NA   Reasons for Ideation (Past Month) NA   Actual Attempt (Lifetime) No   Actual Attempt (Past 3 Months) No   Has subject engaged in non-suicidal self-injurious behavior? (Lifetime) No   Has subject engaged in non-suicidal self-injurious behavior? (Past 3 Months) No   Interrupted Attempts (Lifetime) No   Interrupted Attempts (Past 3 Months) No   Aborted or Self-Interrupted Attempt (Lifetime) No   Aborted or Self-Interrupted Attempt  (Past 3 Months) No   Preparatory Acts or Behavior (Lifetime) No   Preparatory Acts or Behavior (Past 3 Months) No     Patient denies current fears or concerns for personal safety.  Patient denies current or recent suicidal ideation or behaviors.  Patient denies current or recent homicidal ideation or behaviors.  Patient denies current or recent self injurious behavior or ideation.  Patient denies other safety concerns.  Recommended that patient call 911 or go to the local ED should there be a change in any of these risk factors.  Patient reports there are no firearms in the house.     Diagnostic Criteria:  CRITERIA (A-C) REPRESENT A MAJOR DEPRESSIVE EPISODE - SELECT THESE CRITERIA  A) Single episode - symptoms have been present during the same 2-week period and represent a change from previous functioning 5 or more symptoms (required for diagnosis)   - Depressed mood. Note: In children and adolescents, can be irritable mood.     - Diminished interest or pleasure in all, or almost all, activities.    - Decreased sleep.    - Psychomotor activity agitation.    - Fatigue or loss of energy.    - Feelings of worthlessness or inappropriate and excessive guilt.    - Diminished ability to think or concentrate, or indecisiveness.   B) The symptoms cause clinically significant distress or impairment in social, occupational, or other important areas of functioning  C) The episode is not attributable to the physiological effects of a substance or to another medical condition  D) The occurence of major depressive episode is not better explained by other thought / psychotic disorders  E) There has never been a manic episode or hypomanic episode      DSM5 Diagnoses: (Sustained by DSM5 Criteria Listed Above)  Diagnoses: 296.21 (F32.0) Major Depressive Disorder, Single Episode, Mild With anxious distress  Psychosocial & Contextual Factors: recently unemployed, limited support system  WHODAS 2.0 (12 item): No flowsheet data  "found.  Intervention:   ACT- Patient was introduced to ACT therapy through the following metaphor \"toward\" and \"away\" moves  Collateral Reports Completed:  Not Applicable      PLAN: (Homework, other):  1. Provider will continue Diagnostic Assessment.  Patient was given the following to do until next session:  Practice recognizing himself when he makes \"toward\" moves, actions that align with his values.    2. Provider recommended the following referrals: na.      3.  Safety plan created.  Provider recommended that patient  morgan.       CHUY Ku, LICSW October 7, 2021              "

## 2021-10-08 ASSESSMENT — COLUMBIA-SUICIDE SEVERITY RATING SCALE - C-SSRS
TOTAL  NUMBER OF ABORTED OR SELF INTERRUPTED ATTEMPTS PAST LIFETIME: NO
ATTEMPT PAST THREE MONTHS: NO
2. HAVE YOU ACTUALLY HAD ANY THOUGHTS OF KILLING YOURSELF LIFETIME?: NO
1. IN THE PAST MONTH, HAVE YOU WISHED YOU WERE DEAD OR WISHED YOU COULD GO TO SLEEP AND NOT WAKE UP?: NO
6. HAVE YOU EVER DONE ANYTHING, STARTED TO DO ANYTHING, OR PREPARED TO DO ANYTHING TO END YOUR LIFE?: NO
3. HAVE YOU BEEN THINKING ABOUT HOW YOU MIGHT KILL YOURSELF?: NO
1. IN THE PAST MONTH, HAVE YOU WISHED YOU WERE DEAD OR WISHED YOU COULD GO TO SLEEP AND NOT WAKE UP?: NO
TOTAL  NUMBER OF INTERRUPTED ATTEMPTS LIFETIME: NO
2. HAVE YOU ACTUALLY HAD ANY THOUGHTS OF KILLING YOURSELF?: NO
ATTEMPT LIFETIME: NO
TOTAL  NUMBER OF INTERRUPTED ATTEMPTS PAST 3 MONTHS: NO
TOTAL  NUMBER OF ABORTED OR SELF INTERRUPTED ATTEMPTS PAST 3 MONTHS: NO
6. HAVE YOU EVER DONE ANYTHING, STARTED TO DO ANYTHING, OR PREPARED TO DO ANYTHING TO END YOUR LIFE?: NO
4. HAVE YOU HAD THESE THOUGHTS AND HAD SOME INTENTION OF ACTING ON THEM?: NO
5. HAVE YOU STARTED TO WORK OUT OR WORKED OUT THE DETAILS OF HOW TO KILL YOURSELF? DO YOU INTEND TO CARRY OUT THIS PLAN?: NO
4. HAVE YOU HAD THESE THOUGHTS AND HAD SOME INTENTION OF ACTING ON THEM?: NO
5. HAVE YOU STARTED TO WORK OUT OR WORKED OUT THE DETAILS OF HOW TO KILL YOURSELF? DO YOU INTEND TO CARRY OUT THIS PLAN?: NO

## 2021-10-14 ENCOUNTER — TELEPHONE (OUTPATIENT)
Dept: SLEEP MEDICINE | Facility: CLINIC | Age: 61
End: 2021-10-14

## 2021-10-14 NOTE — TELEPHONE ENCOUNTER
PT CALLED CONCERNED ABOUT PRESSURES SETTINGS WITH THE DREAMSTATION 2. RETURNED PT CALL AND CHECKED CARE  AND THE PT MIN PRESSURES WAS SET TO 4 CMH20. PT MIN PRESCRIBED PRESSURE IS 9CM H20. CHANGED THE PT MIN PRESSURE BACK TO 9CM H20 AND TURNED OFF THE PT RAMP PER THE PT REQUEST. PT HAD NO OTHER QUESTIONS OR CONCERNS AT THIS TIME.

## 2021-10-29 ENCOUNTER — VIRTUAL VISIT (OUTPATIENT)
Dept: PSYCHOLOGY | Facility: CLINIC | Age: 61
End: 2021-10-29
Payer: COMMERCIAL

## 2021-10-29 DIAGNOSIS — F32.0 CURRENT MILD EPISODE OF MAJOR DEPRESSIVE DISORDER WITHOUT PRIOR EPISODE (H): Primary | ICD-10-CM

## 2021-10-29 PROCEDURE — 90834 PSYTX W PT 45 MINUTES: CPT | Mod: 95 | Performed by: SOCIAL WORKER

## 2021-10-29 NOTE — PROGRESS NOTES
Progress Note - Initial Visit    Client Name:  Grant Shoemaker Date: 10.29.21         Service Type: Individual     Visit Start Time: 9am Visit End Time: 950am    Visit #: 2    Attendees: Client attended alone    Service Modality:  Video Visit:      Provider verified identity through the following two step process.  Patient provided:  Patient photo    Telemedicine Visit: The patient's condition can be safely assessed and treated via synchronous audio and visual telemedicine encounter.      Reason for Telemedicine Visit: Patient has requested telehealth visit    Originating Site (Patient Location): Patient's home    Distant Site (Provider Location): Provider Remote Setting- Home Office    Consent:  The patient/guardian has verbally consented to: the potential risks and benefits of telemedicine (video visit) versus in person care; bill my insurance or make self-payment for services provided; and responsibility for payment of non-covered services.     Patient would like the video invitation sent by:  My Chart    Mode of Communication:  Video Conference via Imagry    As the provider I attest to compliance with applicable laws and regulations related to telemedicine.       DATA:   Interactive Complexity: No   Crisis: No     Presenting Concerns/  Current Stressors: Reflected on pt's loss of sense of purpose/contribution since losing job. Pt reports noticing that he is not attending to activities that once gave him ray. Processed and explored pt's feelings of mistrust with his employer. Discussed how pt notices that he wants to leave this experience behind him and yet he is representing himself in court which is bringing up emotions from the past making it more difficult to engage with life in the present. Discussed pt moving forward needing to survive on a lost income.     ASSESSMENT:  Mental Status Assessment:  Appearance:   Appropriate   Eye Contact:   Good   Psychomotor Behavior: Normal    Attitude:   Cooperative   Orientation:   All  Speech   Rate / Production: Normal/ Responsive   Volume:  Normal   Mood:    Anxious   Affect:    Appropriate   Thought Content:  Clear   Thought Form:  Coherent   Insight:    Fair       Safety Issues and Plan for Safety and Risk Management:     [place] Suicide Severity Rating Scale (Lifetime/Recent)  [place] Suicide Severity Rating (Lifetime/Recent) 10/8/2021   1. Wish to be Dead (Lifetime) No   1. Wish to be Dead (Recent) No   2. Non-Specific Active Suicidal Thoughts (Lifetime) No   2. Non-Specific Active Suicidal Thoughts (Recent) No   3. Active Suicidal Ideation with any Methods (Not Plan) Without Intent to Act (Lifetime) No   3. Active Suicidal Ideation with any Methods (Not Plan) Without Intent to Act (Recent) No   4. Active Suicidal Ideation with Some Intent to Act, Without Specific Plan (Lifetime) No   4. Active Suicidal Ideation with Some Intent to Act, Without Specific Plan (Recent) No   5. Active Suicidal Ideation with Specific Plan and Intent (Lifetime) No   5. Active Suicidal Ideation with Specific Plan and Intent (Recent) No   Most Severe Ideation Rating (Lifetime) NA   Most Severe Ideation Description (Lifetime) n   Frequency (Lifetime) NA   Duration (Lifetime) NA   Controllability (Lifetime) NA   Protective Factors  (Lifetime) NA   Reasons for Ideation (Lifetime) NA   Most Severe Ideation Rating (Past Month) NA   Most Severe Ideation Description (Past Month) nn   Frequency (Past Month) NA   Duration (Past Month) NA   Controllability (Past Month) NA   Reasons for Ideation (Past Month) NA   Actual Attempt (Lifetime) No   Actual Attempt (Past 3 Months) No   Has subject engaged in non-suicidal self-injurious behavior? (Lifetime) No   Has subject engaged in non-suicidal self-injurious behavior? (Past 3 Months) No   Interrupted Attempts (Lifetime) No   Interrupted Attempts (Past 3 Months) No   Aborted or Self-Interrupted Attempt (Lifetime) No   Aborted or  Self-Interrupted Attempt (Past 3 Months) No   Preparatory Acts or Behavior (Lifetime) No   Preparatory Acts or Behavior (Past 3 Months) No     Patient denies current fears or concerns for personal safety.  Patient denies current or recent suicidal ideation or behaviors.  Patient denies current or recent homicidal ideation or behaviors.  Patient denies current or recent self injurious behavior or ideation.  Patient denies other safety concerns.  Recommended that patient call 911 or go to the local ED should there be a change in any of these risk factors.  Patient reports there are no firearms in the house.     Diagnostic Criteria:  CRITERIA (A-C) REPRESENT A MAJOR DEPRESSIVE EPISODE - SELECT THESE CRITERIA  A) Single episode - symptoms have been present during the same 2-week period and represent a change from previous functioning 5 or more symptoms (required for diagnosis)   - Depressed mood. Note: In children and adolescents, can be irritable mood.     - Diminished interest or pleasure in all, or almost all, activities.    - Decreased sleep.    - Psychomotor activity agitation.    - Fatigue or loss of energy.    - Feelings of worthlessness or inappropriate and excessive guilt.    - Diminished ability to think or concentrate, or indecisiveness.   B) The symptoms cause clinically significant distress or impairment in social, occupational, or other important areas of functioning  C) The episode is not attributable to the physiological effects of a substance or to another medical condition  D) The occurence of major depressive episode is not better explained by other thought / psychotic disorders  E) There has never been a manic episode or hypomanic episode      DSM5 Diagnoses: (Sustained by DSM5 Criteria Listed Above)  Diagnoses: 296.21 (F32.0) Major Depressive Disorder, Single Episode, Mild With anxious distress  Psychosocial & Contextual Factors: recently unemployed, limited support system  WHODAS 2.0 (12 item): No  "flowsheet data found.  Intervention:   ACT- Patient was introduced to ACT therapy through the following metaphor \"toward\" and \"away\" moves  Collateral Reports Completed:  Not Applicable      PLAN: (Homework, other):  1. Provider will continue Diagnostic Assessment.  Patient was given the following to do until next session:  Pt will review worksheet on away and toward moves and will review worksheet on values rating scale.     2. Provider recommended the following referrals: na.      3.  Safety plan created.  Provider recommended that patient  na.       CHUY Ku, LICSW October 29, 2021            "

## 2021-12-10 ENCOUNTER — VIRTUAL VISIT (OUTPATIENT)
Dept: PSYCHOLOGY | Facility: CLINIC | Age: 61
End: 2021-12-10
Payer: COMMERCIAL

## 2021-12-10 DIAGNOSIS — F32.0 CURRENT MILD EPISODE OF MAJOR DEPRESSIVE DISORDER WITHOUT PRIOR EPISODE (H): Primary | ICD-10-CM

## 2021-12-10 PROCEDURE — 90791 PSYCH DIAGNOSTIC EVALUATION: CPT | Mod: 95 | Performed by: SOCIAL WORKER

## 2021-12-10 NOTE — PROGRESS NOTES
[facility]  Provider Name:  Jim Caio     Credentials:  CHUY, KAREN    PATIENT'S NAME: Grant Shoemaker  PREFERRED NAME: Andrew  PRONOUNS:       MRN: [id number]  : 1960  ADDRESS: [address] [place] [address]  ACCT. NUMBER:  [id number]  DATE OF SERVICE: 12/10/21  START TIME: multiple sessions  END TIME: multiple sessions  PREFERRED PHONE: [phone]  May we leave a program related message: Yes  SERVICE MODALITY:  Video Visit:      Provider verified identity through the following two step process.  Patient provided:  Patient photo    Telemedicine Visit: The patient's condition can be safely assessed and treated via synchronous audio and visual telemedicine encounter.      Reason for Telemedicine Visit: Patient has requested telehealth visit    Originating Site (Patient Location): Patient's home    Distant Site (Provider Location): Provider Remote Setting- Home Office    Consent:  The patient/guardian has verbally consented to: the potential risks and benefits of telemedicine (video visit) versus in person care; bill my insurance or make self-payment for services provided; and responsibility for payment of non-covered services.     Patient would like the video invitation sent by:  My Chart    Mode of Communication:  Video Conference via well    As the provider I attest to compliance with applicable laws and regulations related to telemedicine.    UNIVERSAL ADULT Mental Health DIAGNOSTIC ASSESSMENT    Identifying Information:  Patient is a 61 year old,  .  The pronoun use throughout this assessment reflects the patient's chosen pronoun.  Patient was referred for an assessment by self .  Patient attended the session alone.     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 Processed how pt was fired based out of a discrimination claim. Processed how pt had been  to court acting as his own  to claim unemployment benefits. Discussed pt's observations that his old  did not like him was unfair and similar was the . Processed pt's underlying worries that this may be related to symptoms of paranoia which pt is interested in having assessed. Pt reflected on a loss of sense of purpose/contribution since losing job. Pt reports noticing that he is not attending to activities that once gave him ray. Processed and explored pt's feelings of mistrust with his employer. Discussed how pt notices that he wants to leave this experience behind him and yet he is representing himself in court which is bringing up emotions from the past making it more difficult to engage with life in the present. Discussed how pt believes that perfection is a moving target and there are always ways to make things better. 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     Patient has attempted to resolve these concerns in the past through na.    Social/Family History:  Patient reported they grew up in [place], [place].  They were raised by biological parents.  Parents stayed .   Patient reported that their childhood was okay.  Patient described their current relationships with family of origin as somewhat close.      The patient describes their cultural background as white, middle class.  Cultural influences and impact on patient's life structure, values, norms, and healthcare: na.  Contextual influences on patient's health include: Individual Factors na.    These factors will be addressed in the Preliminary Treatment plan.  Patient identified their preferred language to be English. Patient reported they does not need the assistance of an  or other support involved in therapy.     Patient reported had no significant delays in developmental  "tasks.   Patient's highest education level was some college. Pt reports having a degree as a  in 2017. Pt reflected on a sense of loss and out the thought of \"I think my degree that I had in the past does not fit the job's that are in the world today. 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 Patient identified the following learning problems: none reported.  Modifications will not be used to assist communication in therapy.   Patient reports they are not  able to understand written materials.    Patient reported the following relationship history  and .  Patient's current relationship status is single.   Patient identified their sexual orientation as heterosexual.  Patient reported having two child(edmund). Patient identified limited as part of their support system.  Patient identified the quality of these relationships as inconsistent.      Patient's current living/housing situation involves staying in own home/apartment.  They live with themself and they report that housing is stable.     Patient is currently unemployed.  Patient reports their finances are obtained through savings.  Patient does identify finances as a current stressor.      Patient reported that they have been involved with the legal system.   Patient denies being on probation / parole / under the jurisdiction of the court.    Patient's Strengths and Limitations:  Patient identified the following strengths or resources that will help them succeed in treatment: insight, intelligence, motivation and sense of humor. Things that may interfere with the patient's success in treatment include: few friends, financial hardship, lack of family support and lack of social support.     Personal and Family Medical History:   Patient does not " report a family history of mental health concerns.  Patient reports family history includes Esophageal Cancer in his father; Pancreatic Cancer in his mother; Thyroid Disease in his sister..     Patient does not report Mental Health Diagnosis or Treatment.      Patient has had a physical exam to rule out medical causes for current symptoms.  Date of last physical exam was within the past year. Client was encouraged to follow up with PCP if symptoms were to develop. The patient has a [place] [facility] Provider, who is named Sheridan Mistry.  Patient reports no current medical concerns.  Patient denies any issues with pain..   There are not significant appetite / nutritional concerns / weight changes.   Patient does not report a history of head injury / trauma / cognitive impairment.      Patient reports current meds as:   No outpatient medications have been marked as taking for the 12/10/21 encounter (Appointment) with Doe Kearney       Medication Adherence:  Patient reports taking prescribed medications as prescribed.    Patient Allergies:  No Known Allergies    Medical History:    Past Medical History:   Diagnosis Date     Adenomatous polyp of colon      Hyperlipidemia      Hypertension      Hypothyroidism      ANN (obstructive sleep apnea)          Current Mental Status Exam:   Appearance:  Appropriate    Eye Contact:  Good   Psychomotor:  Normal       Gait / station:  no problem  Attitude / Demeanor: Cooperative  Interested  Speech      Rate / Production: Normal/ Responsive      Volume:  Normal  volume      Language:  intact  Mood:   Anxious   Affect:   Appropriate    Thought Content: Clear  Obsessions  Thought Process: Coherent       Associations: No loosening of associations  Insight:   Fair   Judgment:  Intact   Orientation:  All  Attention/concentration: Good    Rating Scales:    PHQ9:  No flowsheet data found.;    GAD7:  No flowsheet data found.  CGI:     First:No data recorded;    Most recentNo  data recorded    Substance Use:  Patient did not report a family history of substance use concerns; see medical history section for details.  Patient has not received chemical dependency treatment in the past.  Patient has not ever been to detox.      Patient is not currently receiving any chemical dependency treatment. Patient reported the following problems as a result of their substance use:  na.    Patient denies using alcohol.  Patient denies using tobacco.  Patient denies using cannabis.  Patient denies using caffeine.  Patient reports using/abusing the following substance(s). Patient reported no other substance use.     CAGE- AID:  No flowsheet data found.    Substance Use: No symptoms    Based on the negative CAGE score and clinical interview there  are not indications of drug or alcohol abuse.      Significant Losses / Trauma / Abuse / Neglect Issues:   Patient   not serve in the .  There are indications or report of significant loss, trauma, abuse or neglect issues related to: divorce, job loss. Concerns for possible neglect are not present.     Safety Assessment:   Current Safety Concerns:  [place] Suicide Severity Rating Scale (Lifetime/Recent)  [place] Suicide Severity Rating (Lifetime/Recent) 10/8/2021   1. Wish to be Dead (Lifetime) No   1. Wish to be Dead (Recent) No   2. Non-Specific Active Suicidal Thoughts (Lifetime) No   2. Non-Specific Active Suicidal Thoughts (Recent) No   3. Active Suicidal Ideation with any Methods (Not Plan) Without Intent to Act (Lifetime) No   3. Active Suicidal Ideation with any Methods (Not Plan) Without Intent to Act (Recent) No   4. Active Suicidal Ideation with Some Intent to Act, Without Specific Plan (Lifetime) No   4. Active Suicidal Ideation with Some Intent to Act, Without Specific Plan (Recent) No   5. Active Suicidal Ideation with Specific Plan and Intent (Lifetime) No   5. Active Suicidal Ideation with Specific Plan and Intent (Recent) No   Most  Severe Ideation Rating (Lifetime) NA   Most Severe Ideation Description (Lifetime) n   Frequency (Lifetime) NA   Duration (Lifetime) NA   Controllability (Lifetime) NA   Protective Factors  (Lifetime) NA   Reasons for Ideation (Lifetime) NA   Most Severe Ideation Rating (Past Month) NA   Most Severe Ideation Description (Past Month) nn   Frequency (Past Month) NA   Duration (Past Month) NA   Controllability (Past Month) NA   Reasons for Ideation (Past Month) NA   Actual Attempt (Lifetime) No   Actual Attempt (Past 3 Months) No   Has subject engaged in non-suicidal self-injurious behavior? (Lifetime) No   Has subject engaged in non-suicidal self-injurious behavior? (Past 3 Months) No   Interrupted Attempts (Lifetime) No   Interrupted Attempts (Past 3 Months) No   Aborted or Self-Interrupted Attempt (Lifetime) No   Aborted or Self-Interrupted Attempt (Past 3 Months) No   Preparatory Acts or Behavior (Lifetime) No   Preparatory Acts or Behavior (Past 3 Months) No     Patient denies current homicidal ideation and behaviors.  Patient denies current self-injurious ideation and behaviors.    Patient denied risk behaviors associated with substance use.  Patient denies any high risk behaviors associated with mental health symptoms.  Patient reports the following current concerns for their personal safety: None.  Patient reports there   firearms in the house.       na.    History of Safety Concerns:  Patient denied a history of homicidal ideation.     Patient denied a history of personal safety concerns.    Patient denied a history of assaultive behaviors.    Patient denied a history of sexual assault behaviors.     Patient denied a history of risk behaviors associated with substance use.  Patient denies any history of high risk behaviors associated with mental health symptoms.  Patient reports the following protective factors:      Risk Plan:  See Recommendations for Safety and Risk Management Plan    Review of Symptoms per  patient report:  Depression: Change in sleep, Lack of interest, Excessive or inappropriate guilt, Change in energy level, Difficulties concentrating, Psychomotor slowing or agitation, Feelings of hopelessness, Low self-worth, Ruminations, Irritability, Feeling sad, down, or depressed and Withdrawn  Kellie:  No Symptoms  Psychosis: No Symptoms  Anxiety: Excessive worry, Nervousness, Sleep disturbance, Psychomotor agitation, Ruminations, Poor concentration and Irritability  Panic:  No symptoms  Post Traumatic Stress Disorder:  No Symptoms   Eating Disorder: No Symptoms  ADD / ADHD:  No symptoms  Conduct Disorder: No symptoms  Autism Spectrum Disorder: No symptoms  Obsessive Compulsive Disorder: Obsessions regarding wanting resolve, especially for the highest stressors in his life. Wanting to work at/think about/solve problems and obsessive until their is a resolve     Patient reports the following compulsive behaviors and treatment history: na.      Diagnostic Criteria:   CRITERIA (A-C) REPRESENT A MAJOR DEPRESSIVE EPISODE - SELECT THESE CRITERIA  A) Single episode - symptoms have been present during the same 2-week period and represent a change from previous functioning 5 or more symptoms (required for diagnosis)   - Depressed mood. Note: In children and adolescents, can be irritable mood.     - Diminished interest or pleasure in all, or almost all, activities.    - Decreased sleep.    - Psychomotor activity agitation.    - Fatigue or loss of energy.    - Feelings of worthlessness or inappropriate and excessive guilt.    - Diminished ability to think or concentrate, or indecisiveness.   B) The symptoms cause clinically significant distress or impairment in social, occupational, or other important areas of functioning  C) The episode is not attributable to the physiological effects of a substance or to another medical condition  D) The occurence of major depressive episode is not better explained by other thought /  psychotic disorders  E) There has never been a manic episode or hypomanic episode     R/o obsessive-complusive personality disorder    Functional Status:  Patient reports the following functional impairments: relationship(s), self-care and work / vocational responsibilities.     WHODAS: No flowsheet data found.  Nonprogrammatic care:  Patient is requesting basic services to address current mental health concerns.    Clinical Summary:  1. Reason for assessment: da  .  2. Psychosocial, Cultural and Contextual Factors: will continue to assess and incorporate into tx plan  .  3. Principal DSM5 Diagnoses  (Sustained by DSM5 Criteria Listed Above):   296.21 (F32.0) Major Depressive Disorder, Single Episode, Mild With anxious distress.  6. Prognosis: Return to Normal Functioning.  7. Likely consequences of symptoms if not treated: decline functioning.  8. Client strengths include:  caring, educated, has a previous history of therapy, insightful and intelligent .     Recommendations:     1. Plan for Safety and Risk Management:   Recommended that patient call 911 or go to the local ED should there be a change in any of these risk factors..          Report to child / adult protection services was NA.     2. Patient's identified na.     3. Initial Treatment will focus on:    Depressed Mood - ACT therapy, address avoidance and behavioral activation .     4. Resources/Service Plan:    services are not indicated.   Modifications to assist communication are not indicated.   Additional disability accommodations are not indicated.      5. Collaboration:   Collaboration / coordination of treatment will be initiated with the following  support professionals: na.      6.  Referrals:   The following referral(s) will be initiated: na. Next Scheduled Appointment: 3 weeks.     A Release of Information has been obtained for the following: na.    7. ORALIA:    ORALIA:  Discussed the general effects of drugs and alcohol on health and  well-being. Provider gave patient printed information about the effects of chemical use on their health and well being. Recommendations:  Continue to assess .     8. Records:   These were reviewed at time of assessment.   Information in this assessment was obtained from the medical record and  provided by patient who is a good historian.    Patient will have open access to their mental health medical record.        Provider Name/ Credentials:  CHUY Cadet, [facility]  12.10.21

## 2021-12-14 ENCOUNTER — IMMUNIZATION (OUTPATIENT)
Dept: NURSING | Facility: CLINIC | Age: 61
End: 2021-12-14
Payer: COMMERCIAL

## 2021-12-14 PROCEDURE — 91300 PR COVID VAC PFIZER DIL RECON 30 MCG/0.3 ML IM: CPT

## 2021-12-14 PROCEDURE — 0004A PR COVID VAC PFIZER DIL RECON 30 MCG/0.3 ML IM: CPT

## 2021-12-29 ENCOUNTER — VIRTUAL VISIT (OUTPATIENT)
Dept: PSYCHOLOGY | Facility: CLINIC | Age: 61
End: 2021-12-29
Payer: COMMERCIAL

## 2021-12-29 DIAGNOSIS — F32.0 CURRENT MILD EPISODE OF MAJOR DEPRESSIVE DISORDER WITHOUT PRIOR EPISODE (H): Primary | ICD-10-CM

## 2021-12-29 PROCEDURE — 90834 PSYTX W PT 45 MINUTES: CPT | Mod: 95 | Performed by: SOCIAL WORKER

## 2021-12-29 NOTE — PROGRESS NOTES
Progress Note    Patient Name: Grant Shoemaker  Date: 12.29.21         Service Type: Individual      Session Start Time: 1230pm  Session End Time: 120pm     Session Length: 50 min    Session #: 3    Attendees: Client attended alone    Service Modality:  Video Visit:      Provider verified identity through the following two step process.  Patient provided:  Patient photo    Telemedicine Visit: The patient's condition can be safely assessed and treated via synchronous audio and visual telemedicine encounter.      Reason for Telemedicine Visit: Patient has requested telehealth visit    Originating Site (Patient Location): Patient's home    Distant Site (Provider Location): Provider Remote Setting- Home Office    Consent:  The patient/guardian has verbally consented to: the potential risks and benefits of telemedicine (video visit) versus in person care; bill my insurance or make self-payment for services provided; and responsibility for payment of non-covered services.     Patient would like the video invitation sent by:  Send to e-mail at: [email or web address]    Mode of Communication:  Video Conference via Amwell    As the provider I attest to compliance with applicable laws and regulations related to telemedicine.     Treatment Plan Last Reviewed: completed 12.29.21  PHQ-9 / DAVID-7 : reviewed    DATA  Interactive Complexity: No  Crisis: No       Progress Since Last Session (Related to Symptoms / Goals / Homework):   Symptoms: similar to last session    Homework: Achieved / completed to satisfaction      Episode of Care Goals: Satisfactory progress - PREPARATION (Decided to change - considering how); Intervened by negotiating a change plan and determining options / strategies for behavior change, identifying triggers, exploring social supports, and working towards setting a date to begin behavior change     Current / Ongoing Stressors and Concerns: Pt reflected how he is  coming up on the longest period of being unemployed other than one other time in his life. He processed the distress with this and the feelings of hopelessness and helplessness. Pt described his experience representing himself in court for unemployment back pay. He reports another hearing scheduled for February 18 to consider whether the past hearing was done correctly. Pt reflected on having an interview with the court of appeals. Reflected on pt's thoughts going through his mind. Processed pt thinking about his age, limiting schooling being realistic financial route. Pt reflected on how the holidays was quiet, he reports watching a musical with his dog. Processed how this was impactful because he was giving himself the opportunity to relax in an opposite of what he often does which is becoming productive. Encouraged pt to continue to find opportunities to practice finding flexibility when he would normally respond with rigidity.      Treatment Objective(s) Addressed in This Session:   Feeling and thought identification      Intervention:   ACT therapy, information on cognitive flexibility         ASSESSMENT: Current Emotional / Mental Status (status of significant symptoms):   Risk status (Self / Other harm or suicidal ideation)   Patient denies current fears or concerns for personal safety.   Patient denies current or recent suicidal ideation or behaviors.   Patient denies current or recent homicidal ideation or behaviors.   Patient denies current or recent self injurious behavior or ideation.   Patient denies other safety concerns.   Patient reports there has been no change in risk factors since their last session.     Patient reports there has been no change in protective factors since their last session.     Recommended that patient call 911 or go to the local ED should there be a change in any of these risk factors.     Appearance:   Appropriate    Eye Contact:   Good    Psychomotor Behavior: Normal     Attitude:   Cooperative    Orientation:   All   Speech    Rate / Production: Normal     Volume:  Normal    Mood:    Anxious    Affect:    Appropriate    Thought Content:  Clear    Thought Form:  Coherent  Logical    Insight:    Fair      Medication Review:   No current psychiatric medications prescribed     Medication Compliance:   Yes     Changes in Health Issues:   None reported     Chemical Use Review:   Substance Use: Chemical use reviewed, no active concerns identified      Tobacco Use: No current tobacco use.      Diagnosis:  1. Current mild episode of major depressive disorder without prior episode (H)        Collateral Reports Completed:   Not Applicable    PLAN: (Patient Tasks / Therapist Tasks / Other): pt will continue to practice self recognition when being able to practice flexibility rather than rigidity. 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       CHUY Ku, [facility], 12.29.21                                                     ______________________________________________________________________    Treatment Plan    Patient's Name: Grant Shoemaker  YOB: 1960    Date: 12.29.21    DSM5 Diagnoses: 296.21 (F32.0) Major Depressive Disorder, Single Episode, Mild With anxious distress; r/o obsessive compulsive personality disorder  Psychosocial / Contextual Factors: loss of job  WHODAS: na    Referral / Collaboration:  Referral to another professional/service is not indicated at this time.    Anticipated number of session or this episode of care: 10-12    MeasurableTreatment Goal(s) related to diagnosis / functional impairment(s)  Goal 1: Client will decrease his depressive symptoms in half from baseline phq9 score.    I will know I've met my goal when I have more energy and feel genuinely better about myself and respond in ways that are helpful to me in the long run.       Objective #A (Client  Action)                Client will review and familiarize himself with feeling words. he will use feeling words to describe when his needs are met/not met.      Status: new - Date: 12.29.21    Intervention(s)  Therapist will assign emotional recognition/identification including when expressing when her needs are not being met or are being met.     Objective #B    Client will Increased understanding of depressive feelings, including developing vocabulary to describe depression and identify cues and symptoms. Client will also identify areas of vulnerability that make his symptoms increase in frequency and intensity.  Status: new - Date: 10.13.21     Intervention(s)  Therapist will provide educational materials on depression.     Objective #C  Client will Increase interest, engagement, and pleasure in doing things  Decrease frequency and intensity of feeling down, depressed, hopeless.  Status: new - Date: 12.29.21     Intervention(s)  Therapist will provide educational materials and handouts on behavioral activation.       Objective #D  Client will Identify negative self-talk and behaviors: challenge core beliefs, myths, and actions.  Status: new - Date: 12.29.21     Intervention(s)  Therapist will provide educational materials and handouts on core beliefs, cognitive distortions, and ACT, including exploring and identifying important values in patient's life.  Patient has reviewed and agreed to the above plan.      Doe Kearney, CHUY, [facility] December 29, 2021

## 2022-01-18 ENCOUNTER — VIRTUAL VISIT (OUTPATIENT)
Dept: PSYCHOLOGY | Facility: CLINIC | Age: 62
End: 2022-01-18
Payer: COMMERCIAL

## 2022-01-18 DIAGNOSIS — F32.0 CURRENT MILD EPISODE OF MAJOR DEPRESSIVE DISORDER WITHOUT PRIOR EPISODE (H): Primary | ICD-10-CM

## 2022-01-18 PROCEDURE — 90834 PSYTX W PT 45 MINUTES: CPT | Mod: 95 | Performed by: SOCIAL WORKER

## 2022-01-18 NOTE — PROGRESS NOTES
Progress Note    Patient Name: Grant Shoemaker  Date: 1.18.22         Service Type: Individual      Session Start Time: 230pm  Session End Time: 320pm     Session Length: 50 min    Session #: 4    Attendees: Client attended alone    Service Modality:  Video Visit:      Provider verified identity through the following two step process.  Patient provided:  Patient photo    Telemedicine Visit: The patient's condition can be safely assessed and treated via synchronous audio and visual telemedicine encounter.      Reason for Telemedicine Visit: Patient has requested telehealth visit    Originating Site (Patient Location): Patient's home    Distant Site (Provider Location): Provider Remote Setting- Home Office    Consent:  The patient/guardian has verbally consented to: the potential risks and benefits of telemedicine (video visit) versus in person care; bill my insurance or make self-payment for services provided; and responsibility for payment of non-covered services.     Patient would like the video invitation sent by:  Send to e-mail at: [email or web address]    Mode of Communication:  Video Conference via Amwell    As the provider I attest to compliance with applicable laws and regulations related to telemedicine.     Treatment Plan Last Reviewed: completed 12.29.21  PHQ-9 / DAVID-7 : reviewed    DATA  Interactive Complexity: No  Crisis: No       Progress Since Last Session (Related to Symptoms / Goals / Homework):   Symptoms: similar to last session    Homework: Achieved / completed to satisfaction      Episode of Care Goals: Satisfactory progress - PREPARATION (Decided to change - considering how); Intervened by negotiating a change plan and determining options / strategies for behavior change, identifying triggers, exploring social supports, and working towards setting a date to begin behavior change     Current / Ongoing Stressors and Concerns: Pt reports continuing to  apply for jobs and is not hearing from others, processed pt's feeling of frustration. Pt reflected on also filing a second court of appeals for not receiving the supplemental unemployment insurance. Pt reports receiving unemployment from time terminated between March- May and now has to repay this. Processed thoughts, feelings and emotions related to this. Pt reflected on applying for jobs for 5 years,  studies, related to career, have gotten 4 interview since completing degree. Pt reflected his experience of meeting with a vocational  and ways this is helpful/not helpful has had resume looked at many times. Processed pt's difficulty in networking which he believes many jobs are obtained through networking. Processed how pt has had connections and went to them and others have shamed pt for coming to them related to past connections with pt's ex-wife.         Treatment Objective(s) Addressed in This Session:   Feeling and thought identification      Intervention:   ACT therapy, information on cognitive flexibility         ASSESSMENT: Current Emotional / Mental Status (status of significant symptoms):   Risk status (Self / Other harm or suicidal ideation)   Patient denies current fears or concerns for personal safety.   Patient denies current or recent suicidal ideation or behaviors.   Patient denies current or recent homicidal ideation or behaviors.   Patient denies current or recent self injurious behavior or ideation.   Patient denies other safety concerns.   Patient reports there has been no change in risk factors since their last session.     Patient reports there has been no change in protective factors since their last session.     Recommended that patient call 911 or go to the local ED should there be a change in any of these risk factors.     Appearance:   Appropriate    Eye Contact:   Good    Psychomotor Behavior: Normal    Attitude:   Cooperative    Orientation:   All   Speech    Rate /  Production: Normal     Volume:  Normal    Mood:    Anxious    Affect:    Appropriate    Thought Content:  Clear    Thought Form:  Coherent  Logical    Insight:    Fair      Medication Review:   No current psychiatric medications prescribed     Medication Compliance:   Yes     Changes in Health Issues:   None reported     Chemical Use Review:   Substance Use: Chemical use reviewed, no active concerns identified      Tobacco Use: No current tobacco use.      Diagnosis:  1. Current mild episode of major depressive disorder without prior episode (H)        Collateral Reports Completed:   Not Applicable    PLAN: (Patient Tasks / Therapist Tasks / Other): pt will find 1-3 job posting and bring to next session that he is emotionally passionate and personally passionate about.      Doe Kearney, MSW, [facility], 1.18.22                                                   ______________________________________________________________________    Treatment Plan    Patient's Name: Grant Shoemaker  YOB: 1960    Date: 12.29.21    DSM5 Diagnoses: 296.21 (F32.0) Major Depressive Disorder, Single Episode, Mild With anxious distress; r/o obsessive compulsive personality disorder  Psychosocial / Contextual Factors: loss of job  WHODAS: na    Referral / Collaboration:  Referral to another professional/service is not indicated at this time.    Anticipated number of session or this episode of care: 10-12    MeasurableTreatment Goal(s) related to diagnosis / functional impairment(s)  Goal 1: Client will decrease his depressive symptoms in half from baseline phq9 score.    I will know I've met my goal when I have more energy and feel genuinely better about myself and respond in ways that are helpful to me in the long run.       Objective #A (Client Action)                Client will review and familiarize himself with feeling words. he will use feeling words to describe when his needs are met/not met.      Status: new -  Date: 12.29.21    Intervention(s)  Therapist will assign emotional recognition/identification including when expressing when her needs are not being met or are being met.     Objective #B    Client will Increased understanding of depressive feelings, including developing vocabulary to describe depression and identify cues and symptoms. Client will also identify areas of vulnerability that make his symptoms increase in frequency and intensity.  Status: new - Date: 10.13.21     Intervention(s)  Therapist will provide educational materials on depression.     Objective #C  Client will Increase interest, engagement, and pleasure in doing things  Decrease frequency and intensity of feeling down, depressed, hopeless.  Status: new - Date: 12.29.21     Intervention(s)  Therapist will provide educational materials and handouts on behavioral activation.       Objective #D  Client will Identify negative self-talk and behaviors: challenge core beliefs, myths, and actions.  Status: new - Date: 12.29.21     Intervention(s)  Therapist will provide educational materials and handouts on core beliefs, cognitive distortions, and ACT, including exploring and identifying important values in patient's life.  Patient has reviewed and agreed to the above plan.      CHUY Ku, LICSW December 29, 2021

## 2022-02-07 ENCOUNTER — VIRTUAL VISIT (OUTPATIENT)
Dept: PSYCHOLOGY | Facility: CLINIC | Age: 62
End: 2022-02-07
Payer: COMMERCIAL

## 2022-02-07 DIAGNOSIS — F32.0 CURRENT MILD EPISODE OF MAJOR DEPRESSIVE DISORDER WITHOUT PRIOR EPISODE (H): Primary | ICD-10-CM

## 2022-02-07 PROCEDURE — 90834 PSYTX W PT 45 MINUTES: CPT | Mod: 95 | Performed by: SOCIAL WORKER

## 2022-02-07 NOTE — PROGRESS NOTES
Progress Note    Patient Name: Grant Shoemaker  Date: 2.7.22         Service Type: Individual      Session Start Time: 230pm  Session End Time: 320pm     Session Length: 50 min    Session #: 5    Attendees: Client attended alone    Service Modality:  Video Visit:      Provider verified identity through the following two step process.  Patient provided:  Patient photo    Telemedicine Visit: The patient's condition can be safely assessed and treated via synchronous audio and visual telemedicine encounter.      Reason for Telemedicine Visit: Patient has requested telehealth visit    Originating Site (Patient Location): Patient's home    Distant Site (Provider Location): Provider Remote Setting- Home Office    Consent:  The patient/guardian has verbally consented to: the potential risks and benefits of telemedicine (video visit) versus in person care; bill my insurance or make self-payment for services provided; and responsibility for payment of non-covered services.     Patient would like the video invitation sent by:  Send to e-mail at: [email or web address]    Mode of Communication:  Video Conference via Amwell    As the provider I attest to compliance with applicable laws and regulations related to telemedicine.     Treatment Plan Last Reviewed: completed 12.29.21  PHQ-9 / DAVID-7 : reviewed    DATA  Interactive Complexity: No  Crisis: No       Progress Since Last Session (Related to Symptoms / Goals / Homework):   Symptoms: similar to last session    Homework: Achieved / completed to satisfaction      Episode of Care Goals: Satisfactory progress - PREPARATION (Decided to change - considering how); Intervened by negotiating a change plan and determining options / strategies for behavior change, identifying triggers, exploring social supports, and working towards setting a date to begin behavior change     Current / Ongoing Stressors and Concerns: Pt reflected on  "continuing two court cases, with the most recent being un eligible  for the unemployment regarding the [place] Cares Act. Discussed pt receiving a letter stating that he must begin making payments from past unemployment collected, however pt needs to apply . Interview for a legal skill jobs, they encouraged him to continue to apply for type of position that becomes available. Pt reflected on how this provided him with reassurance that he is applying for the right jobs, yet frustration because he still does not have a position. Discussed how this is important to have a piece of evidence when he begins to think \"I am clueless here, I don't know what to do the get feedback from others.\"      Treatment Objective(s) Addressed in This Session:   Feeling and thought identification      Intervention:   ACT therapy, information on cognitive flexibility         ASSESSMENT: Current Emotional / Mental Status (status of significant symptoms):   Risk status (Self / Other harm or suicidal ideation)   Patient denies current fears or concerns for personal safety.   Patient denies current or recent suicidal ideation or behaviors.   Patient denies current or recent homicidal ideation or behaviors.   Patient denies current or recent self injurious behavior or ideation.   Patient denies other safety concerns.   Patient reports there has been no change in risk factors since their last session.     Patient reports there has been no change in protective factors since their last session.     Recommended that patient call 911 or go to the local ED should there be a change in any of these risk factors.     Appearance:   Appropriate    Eye Contact:   Good    Psychomotor Behavior: Normal    Attitude:   Cooperative    Orientation:   All   Speech    Rate / Production: Normal     Volume:  Normal    Mood:    Anxious    Affect:    Appropriate    Thought Content:  Clear    Thought Form:  Coherent  Logical    Insight:    Fair      Medication Review:   No " current psychiatric medications prescribed     Medication Compliance:   Yes     Changes in Health Issues:   None reported     Chemical Use Review:   Substance Use: Chemical use reviewed, no active concerns identified      Tobacco Use: No current tobacco use.      Diagnosis:  1. Current mild episode of major depressive disorder without prior episode (H)        Collateral Reports Completed:   Not Applicable    PLAN: (Patient Tasks / Therapist Tasks / Other): pt will practice transforming his beliefs about how he contributes.       Doe Kearney, MSW, [facility], 2.7.22                                                 ______________________________________________________________________    Treatment Plan    Patient's Name: Grant Shoemaker  YOB: 1960    Date: 12.29.21    DSM5 Diagnoses: 296.21 (F32.0) Major Depressive Disorder, Single Episode, Mild With anxious distress; r/o obsessive compulsive personality disorder  Psychosocial / Contextual Factors: loss of job  WHODAS: na    Referral / Collaboration:  Referral to another professional/service is not indicated at this time.    Anticipated number of session or this episode of care: 10-12    MeasurableTreatment Goal(s) related to diagnosis / functional impairment(s)  Goal 1: Client will decrease his depressive symptoms in half from baseline phq9 score.    I will know I've met my goal when I have more energy and feel genuinely better about myself and respond in ways that are helpful to me in the long run.       Objective #A (Client Action)                Client will review and familiarize himself with feeling words. he will use feeling words to describe when his needs are met/not met.      Status: new - Date: 12.29.21    Intervention(s)  Therapist will assign emotional recognition/identification including when expressing when her needs are not being met or are being met.     Objective #B    Client will Increased understanding of depressive feelings,  including developing vocabulary to describe depression and identify cues and symptoms. Client will also identify areas of vulnerability that make his symptoms increase in frequency and intensity.  Status: new - Date: 10.13.21     Intervention(s)  Therapist will provide educational materials on depression.     Objective #C  Client will Increase interest, engagement, and pleasure in doing things  Decrease frequency and intensity of feeling down, depressed, hopeless.  Status: new - Date: 12.29.21     Intervention(s)  Therapist will provide educational materials and handouts on behavioral activation.       Objective #D  Client will Identify negative self-talk and behaviors: challenge core beliefs, myths, and actions.  Status: new - Date: 12.29.21     Intervention(s)  Therapist will provide educational materials and handouts on core beliefs, cognitive distortions, and ACT, including exploring and identifying important values in patient's life.  Patient has reviewed and agreed to the above plan.      CHUY Ku, [facility] December 29, 2021

## 2022-02-22 ENCOUNTER — VIRTUAL VISIT (OUTPATIENT)
Dept: PSYCHOLOGY | Facility: CLINIC | Age: 62
End: 2022-02-22
Payer: COMMERCIAL

## 2022-02-22 DIAGNOSIS — F32.0 CURRENT MILD EPISODE OF MAJOR DEPRESSIVE DISORDER WITHOUT PRIOR EPISODE (H): Primary | ICD-10-CM

## 2022-02-22 PROCEDURE — 90834 PSYTX W PT 45 MINUTES: CPT | Mod: 95 | Performed by: SOCIAL WORKER

## 2022-02-22 NOTE — PROGRESS NOTES
Progress Note    Patient Name: Grant Shoemaker  Date: 2.22.22         Service Type: Individual      Session Start Time: 230pm  Session End Time: 320pm     Session Length: 50 min    Session #: 5    Attendees: Client attended alone    Service Modality:  Video Visit:      Provider verified identity through the following two step process.  Patient provided:  Patient photo    Telemedicine Visit: The patient's condition can be safely assessed and treated via synchronous audio and visual telemedicine encounter.      Reason for Telemedicine Visit: Patient has requested telehealth visit    Originating Site (Patient Location): Patient's home    Distant Site (Provider Location): Provider Remote Setting- Home Office    Consent:  The patient/guardian has verbally consented to: the potential risks and benefits of telemedicine (video visit) versus in person care; bill my insurance or make self-payment for services provided; and responsibility for payment of non-covered services.     Patient would like the video invitation sent by:  Send to e-mail at: [email or web address]    Mode of Communication:  Video Conference via [employer]    As the provider I attest to compliance with applicable laws and regulations related to telemedicine.     Treatment Plan Last Reviewed: completed 12.29.21  PHQ-9 / DAVID-7 : reviewed    DATA  Interactive Complexity: No  Crisis: No       Progress Since Last Session (Related to Symptoms / Goals / Homework):   Symptoms: similar to last session    Homework: Achieved / completed to satisfaction      Episode of Care Goals: Satisfactory progress - PREPARATION (Decided to change - considering how); Intervened by negotiating a change plan and determining options / strategies for behavior change, identifying triggers, exploring social supports, and working towards setting a date to begin behavior change     Current / Ongoing Stressors and Concerns: Discussed pt's continued  distress with being unemployment. Processed how pt is feeling more hopelessness and is noticing signs of depression. He described being isolated due to the cold weather, not having a job to attend to. Pt processed how he has limited contact with others. He described reaching out to his two daughters, yet they are sometimes inconsistent with their responses. He reports noticing himself sleeping in during the day, sleeping more than usual, and not always attending to tasks around the home. Pt reflected on not having thoughts of suicide. He reflected on having his dog which is really helpful to talk with and help to motivate him to get out and about. This writer talked with pt about joining an online support group through [employer] or attending an IOP or partial hospitalization program. We discussed how among benefits of connecting with others, and learning about mental health, it will provide structure, routine, and create a sense of purpose and meaning which is not present in his life currently. Pt agreed to think about these options and we will discuss during next visit.          Treatment Objective(s) Addressed in This Session:   Feeling and thought identification      Intervention:   ACT therapy, information on cognitive flexibility         ASSESSMENT: Current Emotional / Mental Status (status of significant symptoms):   Risk status (Self / Other harm or suicidal ideation)   Patient denies current fears or concerns for personal safety.   Patient denies current or recent suicidal ideation or behaviors.   Patient denies current or recent homicidal ideation or behaviors.   Patient denies current or recent self injurious behavior or ideation.   Patient denies other safety concerns.   Patient reports there has been no change in risk factors since their last session.     Patient reports there has been no change in protective factors since their last session.     Recommended that patient call 911 or go to the local ED should  there be a change in any of these risk factors.     Appearance:   Appropriate    Eye Contact:   Good    Psychomotor Behavior: Normal    Attitude:   Cooperative    Orientation:   All   Speech    Rate / Production: Normal     Volume:  Normal    Mood:    Anxious    Affect:    Appropriate    Thought Content:  Clear    Thought Form:  Coherent  Logical    Insight:    Fair      Medication Review:   No current psychiatric medications prescribed     Medication Compliance:   Yes     Changes in Health Issues:   None reported     Chemical Use Review:   Substance Use: Chemical use reviewed, no active concerns identified      Tobacco Use: No current tobacco use.      Diagnosis:  1. Current mild episode of major depressive disorder without prior episode (H)        Collateral Reports Completed:   Not Applicable    PLAN: (Patient Tasks / Therapist Tasks / Other): Pt will think about joining an online support group through [employer] or attending an IOP or partial hospitalization program.       CHUY Ku, [facility], 2.22.22                                                 ______________________________________________________________________    Treatment Plan    Patient's Name: Grant Shoemaker  YOB: 1960    Date: 12.29.21    DSM5 Diagnoses: 296.21 (F32.0) Major Depressive Disorder, Single Episode, Mild With anxious distress; r/o obsessive compulsive personality disorder  Psychosocial / Contextual Factors: loss of job  WHODAS: na    Referral / Collaboration:  Referral to another professional/service is not indicated at this time.    Anticipated number of session or this episode of care: 10-12    MeasurableTreatment Goal(s) related to diagnosis / functional impairment(s)  Goal 1: Client will decrease his depressive symptoms in half from baseline phq9 score.    I will know I've met my goal when I have more energy and feel genuinely better about myself and respond in ways that are helpful to me in the long  run.       Objective #A (Client Action)                Client will review and familiarize himself with feeling words. he will use feeling words to describe when his needs are met/not met.      Status: new - Date: 12.29.21    Intervention(s)  Therapist will assign emotional recognition/identification including when expressing when her needs are not being met or are being met.     Objective #B    Client will Increased understanding of depressive feelings, including developing vocabulary to describe depression and identify cues and symptoms. Client will also identify areas of vulnerability that make his symptoms increase in frequency and intensity.  Status: new - Date: 10.13.21     Intervention(s)  Therapist will provide educational materials on depression.     Objective #C  Client will Increase interest, engagement, and pleasure in doing things  Decrease frequency and intensity of feeling down, depressed, hopeless.  Status: new - Date: 12.29.21     Intervention(s)  Therapist will provide educational materials and handouts on behavioral activation.       Objective #D  Client will Identify negative self-talk and behaviors: challenge core beliefs, myths, and actions.  Status: new - Date: 12.29.21     Intervention(s)  Therapist will provide educational materials and handouts on core beliefs, cognitive distortions, and ACT, including exploring and identifying important values in patient's life.  Patient has reviewed and agreed to the above plan.      Doe Kearney, CHUY, [facility] CenterSW December 29, 2021

## 2022-02-23 DIAGNOSIS — E03.9 ACQUIRED HYPOTHYROIDISM: ICD-10-CM

## 2022-02-23 DIAGNOSIS — I10 BENIGN ESSENTIAL HYPERTENSION: ICD-10-CM

## 2022-02-23 RX ORDER — LISINOPRIL 10 MG/1
TABLET ORAL
Qty: 90 TABLET | Refills: 1 | Status: SHIPPED | OUTPATIENT
Start: 2022-02-23 | End: 2022-08-25

## 2022-02-23 RX ORDER — LEVOTHYROXINE SODIUM 150 UG/1
TABLET ORAL
Qty: 90 TABLET | Refills: 1 | Status: SHIPPED | OUTPATIENT
Start: 2022-02-23 | End: 2022-08-25

## 2022-02-23 NOTE — TELEPHONE ENCOUNTER
Prescription approved per [facility] Refill Protocol.    Due for appointment in June 2022

## 2022-02-24 ENCOUNTER — MYC MEDICAL ADVICE (OUTPATIENT)
Dept: INTERNAL MEDICINE | Facility: CLINIC | Age: 62
End: 2022-02-24
Payer: COMMERCIAL

## 2022-03-05 ENCOUNTER — MYC MEDICAL ADVICE (OUTPATIENT)
Dept: INTERNAL MEDICINE | Facility: CLINIC | Age: 62
End: 2022-03-05
Payer: COMMERCIAL

## 2022-03-05 DIAGNOSIS — I10 BENIGN ESSENTIAL HYPERTENSION: ICD-10-CM

## 2022-03-07 RX ORDER — TRIAMTERENE/HYDROCHLOROTHIAZID 37.5-25 MG
1 TABLET ORAL DAILY
Qty: 90 TABLET | Refills: 1 | Status: SHIPPED | OUTPATIENT
Start: 2022-03-07 | End: 2022-08-25

## 2022-03-07 NOTE — TELEPHONE ENCOUNTER
"Prescription approved per [facility] Refill Protocol.    Due around June 2022 for Physical    Requested Prescriptions   Pending Prescriptions Disp Refills     triamterene-HCTZ (MAXZIDE-25) 37.5-25 MG tablet 90 tablet 2     Sig: Take 1 tablet by mouth daily       Diuretics (Including Combos) Protocol Passed - 3/7/2022  8:37 AM        Passed - Blood pressure under 140/90 in past 12 months     BP Readings from Last 3 Encounters:   06/04/21 117/73   03/11/21 105/72   09/30/20 104/68                 Passed - Recent (12 mo) or future (30 days) visit within the authorizing provider's specialty     Patient has had an office visit with the authorizing provider or a provider within the authorizing providers department within the previous 12 mos or has a future within next 30 days. See \"Patient Info\" tab in inbasket, or \"Choose Columns\" in Meds & Orders section of the refill encounter.              Passed - Medication is active on med list        Passed - Patient is age 18 or older        Passed - Normal serum creatinine on file in past 12 months     Recent Labs   Lab Test 03/11/21  1050   CR 1.18              Passed - Normal serum potassium on file in past 12 months     Recent Labs   Lab Test 03/11/21  1050   POTASSIUM 4.3                    Passed - Normal serum sodium on file in past 12 months     Recent Labs   Lab Test 03/11/21  1050                        "

## 2022-03-08 ENCOUNTER — VIRTUAL VISIT (OUTPATIENT)
Dept: PSYCHOLOGY | Facility: CLINIC | Age: 62
End: 2022-03-08
Payer: COMMERCIAL

## 2022-03-08 DIAGNOSIS — F32.0 CURRENT MILD EPISODE OF MAJOR DEPRESSIVE DISORDER WITHOUT PRIOR EPISODE (H): Primary | ICD-10-CM

## 2022-03-08 PROCEDURE — 90834 PSYTX W PT 45 MINUTES: CPT | Mod: 95 | Performed by: SOCIAL WORKER

## 2022-03-08 NOTE — PROGRESS NOTES
[facility]                                     Progress Note    Patient Name: Grant Shoemaker  Date: 3.8.22         Service Type: Individual      Session Start Time: 230pm  Session End Time: 322pm     Session Length: 53 min    Session #: 6    Attendees: Client attended alone    Service Modality:  Video Visit:      Provider verified identity through the following two step process.  Patient provided:  Patient photo    Telemedicine Visit: The patient's condition can be safely assessed and treated via synchronous audio and visual telemedicine encounter.      Reason for Telemedicine Visit: Patient has requested telehealth visit    Originating Site (Patient Location): Patient's home    Distant Site (Provider Location): Provider Remote Setting- Home Office    Consent:  The patient/guardian has verbally consented to: the potential risks and benefits of telemedicine (video visit) versus in person care; bill my insurance or make self-payment for services provided; and responsibility for payment of non-covered services.     Patient would like the video invitation sent by:  Send to e-mail at: [email or web address]    Mode of Communication:  Video Conference via [employer]well    As the provider I attest to compliance with applicable laws and regulations related to telemedicine.    DATA  Interactive Complexity: No  Crisis: No        Progress Since Last Session (Related to Symptoms / Goals / Homework):   Symptoms: similar to last session    Homework: Achieved / completed to satisfaction      Episode of Care Goals: Achieved / completed to satisfaction - ACTION (Actively working towards change); Intervened by reinforcing change plan / affirming steps taken     Current / Ongoing Stressors and Concerns: Discussed pts continuing ongoing stress of being unable to find a job and being unemployed for over 1 year. Also discussed the stress of not having unemployment insurance to help pay his bills. Processed the  emotional stress of having loans that are increasing and the financial pressure he has is becoming more severe. Explored what would have to happen in order for him to tap into his social security early. For the remainder of the visit, discussed emotions of disappointment and frustration with a recent exchange for an interview with the [employer]. This writer validated pt's thoughts, feelings, and explored ways in which he responded.       Treatment Objective(s) Addressed in This Session:          Feeling and thought identification                  Intervention:              ACT therapy, information on cognitive flexibility    Assessments completed prior to visit:  The following assessments were completed by patient for this visit:  PHQ9: No flowsheet data found.      ASSESSMENT: Current Emotional / Mental Status (status of significant symptoms):   Risk status (Self / Other harm or suicidal ideation)   Patient denies current fears or concerns for personal safety.   Patient denies current or recent suicidal ideation or behaviors.   Patient denies current or recent homicidal ideation or behaviors.   Patient denies current or recent self injurious behavior or ideation.   Patient denies other safety concerns.   Patient reports there has been no change in risk factors since their last session.     Patient reports there has been no change in protective factors since their last session.     Recommended that patient call 911 or go to the local ED should there be a change in any of these risk factors.     Appearance:   Appropriate    Eye Contact:   Good    Psychomotor Behavior: Normal    Attitude:   Cooperative    Orientation:   All   Speech    Rate / Production: Normal/ Responsive Normal     Volume:  Normal    Mood:    Anxious  Sad  Anhedonia   Affect:    Appropriate    Thought Content:  Clear    Thought Form:  Coherent  Logical    Insight:    Fair      Medication Review:   No changes to current psychiatric  medication(s)     Medication Compliance:   Yes     Changes in Health Issues:   None reported     Chemical Use Review:   Substance Use: Chemical use reviewed, no active concerns identified      Tobacco Use: No current tobacco use.      Diagnosis:  1. Current mild episode of major depressive disorder without prior episode (H)        Collateral Reports Completed:   Not Applicable    PLAN: (Patient Tasks / Therapist Tasks / Other): Pt will practice cognitive flexibility by allowing himself to engage with what makes him happy before having his stressor go away: finding a job.    Doe Kearney, MSW, [facility], 3.8.22                                                     ______________________________________________________________________    Individual Treatment Plan    Patient's Name: Grant Shoemaker  YOB: 1960    Date of Creation: 3.8.22  Date Treatment Plan Last Reviewed/Revised: 3.8.22    DSM5 Diagnoses: 296.21 (F32.0) Major Depressive Disorder, Single Episode, Mild With anxious distress; r/o obsessive compulsive personality disorder  Psychosocial / Contextual Factors: loss of job  PROMIS (reviewed every 90 days):     Referral / Collaboration:  Referral to another professional/service is not indicated at this time..    Anticipated number of session for this episode of care: 10-15  Anticipation frequency of session: every 3 weeks  Anticipated Duration of each session: 38-52 minutes  Treatment plan will be reviewed in 90 days or when goals have been changed.     MeasurableTreatment Goal(s) related to diagnosis / functional impairment(s)  Goal 1: Client will decrease his depressive symptoms in half from baseline phq9 score.    I will know I've met my goal when I have more energy and feel genuinely better about myself and respond in ways that are helpful to me in the long run.       Objective #A (Client Action)                Client will review and familiarize himself with feeling words. he will use  feeling words to describe when his needs are met/not met.      Status: new - Date: 12.29.21     Intervention(s)  Therapist will assign emotional recognition/identification including when expressing when her needs are not being met or are being met.     Objective #B    Client will Increased understanding of depressive feelings, including developing vocabulary to describe depression and identify cues and symptoms. Client will also identify areas of vulnerability that make his symptoms increase in frequency and intensity.  Status: new - Date: 10.13.21     Intervention(s)  Therapist will provide educational materials on depression.     Objective #C  Client will Increase interest, engagement, and pleasure in doing things  Decrease frequency and intensity of feeling down, depressed, hopeless.  Status: new - Date: 12.29.21     Intervention(s)  Therapist will provide educational materials and handouts on behavioral activation.       Objective #D  Client will Identify negative self-talk and behaviors: challenge core beliefs, myths, and actions.  Status: new - Date: 12.29.21     Intervention(s)  Therapist will provide educational materials and handouts on core beliefs, cognitive distortions, and ACT, including exploring and identifying important values in patient's life.  Patient has reviewed and agreed to the above plan.        CHUY Ku, LICSW    December 29, 2021

## 2022-03-30 ENCOUNTER — VIRTUAL VISIT (OUTPATIENT)
Dept: PSYCHOLOGY | Facility: CLINIC | Age: 62
End: 2022-03-30
Payer: COMMERCIAL

## 2022-03-30 DIAGNOSIS — F32.0 CURRENT MILD EPISODE OF MAJOR DEPRESSIVE DISORDER WITHOUT PRIOR EPISODE (H): Primary | ICD-10-CM

## 2022-03-30 PROCEDURE — 90834 PSYTX W PT 45 MINUTES: CPT | Mod: 95 | Performed by: SOCIAL WORKER

## 2022-03-30 NOTE — PROGRESS NOTES
[facility]                                     Progress Note    Patient Name: Grant Shoemaker  Date: 3.30.22         Service Type: Individual      Session Start Time: 1105am  Session End Time: 1157am     Session Length: 52 min    Session #: 7    Attendees: Client attended alone    Service Modality:  Video Visit:      Provider verified identity through the following two step process.  Patient provided:  Patient photo    Telemedicine Visit: The patient's condition can be safely assessed and treated via synchronous audio and visual telemedicine encounter.      Reason for Telemedicine Visit: Patient has requested telehealth visit    Originating Site (Patient Location): Patient's home    Distant Site (Provider Location): Provider Remote Setting- Home Office    Consent:  The patient/guardian has verbally consented to: the potential risks and benefits of telemedicine (video visit) versus in person care; bill my insurance or make self-payment for services provided; and responsibility for payment of non-covered services.     Patient would like the video invitation sent by:  Send to e-mail at: [email or web address]    Mode of Communication:  Video Conference via Amwell    As the provider I attest to compliance with applicable laws and regulations related to telemedicine.    DATA  Interactive Complexity: No  Crisis: No        Progress Since Last Session (Related to Symptoms / Goals / Homework):   Symptoms: similar to last session    Homework: Achieved / completed to satisfaction      Episode of Care Goals: Achieved / completed to satisfaction - ACTION (Actively working towards change); Intervened by reinforcing change plan / affirming steps taken     Current / Ongoing Stressors and Concerns: Pt reflected on feeling inspired in finishing one of his court briefs in having to represent himself in court to advocate for his unemployment insurance. Processed how although this stressor of not having an  "income over a year is not gone, he has done everything he's possibly can; processed his pride as accomplishing this brief from start to finish. He states, \"I am much more clear in the process that I've been grabbling with.\" Pt reflected on now he is able to work on other tasks and responsibilities with this most recent accomplishment. This includes applying to jobs and cleaning and organizing. 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 He reflected on how he is met with responses of \"taking interest surveys\" and are not as helpful as he'd like. Encouraged pt to reflect on his willingness and courage to reach out and this aligns with his values of persistence and determination and spirit to be resilient.       Treatment Objective(s) Addressed in This Session:          Feeling and thought identification   Practicing solution based therapy  practicing emotive based therapy              Intervention:              ACT therapy, information on cognitive flexibility   Emotional identification, frustration, disappointment, angry, sadness      Assessments completed prior to visit:  The following assessments were completed by patient for this visit:  PHQ9: No flowsheet data found.      ASSESSMENT: Current Emotional / Mental Status (status of significant symptoms):   Risk status (Self / Other harm or suicidal ideation)   Patient denies current fears or concerns for personal safety.   Patient denies current or recent suicidal ideation or behaviors.   Patient denies current or recent homicidal ideation or behaviors.   Patient denies current or recent self injurious behavior or ideation.   Patient denies other safety concerns.   Patient reports there has been no " change in risk factors since their last session.     Patient reports there has been no change in protective factors since their last session.     Recommended that patient call 911 or go to the local ED should there be a change in any of these risk factors.     Appearance:   Appropriate    Eye Contact:   Good    Psychomotor Behavior: Normal    Attitude:   Cooperative    Orientation:   All   Speech    Rate / Production: Normal/ Responsive Normal     Volume:  Normal    Mood:    Anxious  Sad  Anhedonia   Affect:    Appropriate    Thought Content:  Clear    Thought Form:  Coherent  Logical    Insight:    Fair      Medication Review:   No changes to current psychiatric medication(s)     Medication Compliance:   Yes     Changes in Health Issues:   None reported     Chemical Use Review:   Substance Use: Chemical use reviewed, no active concerns identified      Tobacco Use: No current tobacco use.      Diagnosis:  1. Current mild episode of major depressive disorder without prior episode (H)        Collateral Reports Completed:   Not Applicable    PLAN: (Patient Tasks / Therapist Tasks / Other): Pt will practice cognitive flexibility by allowing himself to engage with what makes him happy before having his stressor go away: finding a job.    CHUY Ku, [facility], 3.30.22                                                     ______________________________________________________________________    Individual Treatment Plan    Patient's Name: Grant Shoemaker  YOB: 1960    Date of Creation: 3.8.22  Date Treatment Plan Last Reviewed/Revised: 3.8.22    DSM5 Diagnoses: 296.21 (F32.0) Major Depressive Disorder, Single Episode, Mild With anxious distress; r/o obsessive compulsive personality disorder  Psychosocial / Contextual Factors: loss of job  PROMIS (reviewed every 90 days):     Referral / Collaboration:  Referral to another professional/service is not indicated at this time..    Anticipated number of  session for this episode of care: 10-15  Anticipation frequency of session: every 3 weeks  Anticipated Duration of each session: 38-52 minutes  Treatment plan will be reviewed in 90 days or when goals have been changed.     MeasurableTreatment Goal(s) related to diagnosis / functional impairment(s)  Goal 1: Client will decrease his depressive symptoms in half from baseline phq9 score.    I will know I've met my goal when I have more energy and feel genuinely better about myself and respond in ways that are helpful to me in the long run.       Objective #A (Client Action)                Client will review and familiarize himself with feeling words. he will use feeling words to describe when his needs are met/not met.      Status: new - Date: 12.29.21     Intervention(s)  Therapist will assign emotional recognition/identification including when expressing when her needs are not being met or are being met.     Objective #B    Client will Increased understanding of depressive feelings, including developing vocabulary to describe depression and identify cues and symptoms. Client will also identify areas of vulnerability that make his symptoms increase in frequency and intensity.  Status: new - Date: 10.13.21     Intervention(s)  Therapist will provide educational materials on depression.     Objective #C  Client will Increase interest, engagement, and pleasure in doing things  Decrease frequency and intensity of feeling down, depressed, hopeless.  Status: new - Date: 12.29.21     Intervention(s)  Therapist will provide educational materials and handouts on behavioral activation.       Objective #D  Client will Identify negative self-talk and behaviors: challenge core beliefs, myths, and actions.  Status: new - Date: 12.29.21     Intervention(s)  Therapist will provide educational materials and handouts on core beliefs, cognitive distortions, and ACT, including exploring and identifying important values in patient's  life.  Patient has reviewed and agreed to the above plan.        CHUY Ku, [facility]    December 29, 2021

## 2022-04-19 ENCOUNTER — VIRTUAL VISIT (OUTPATIENT)
Dept: PSYCHOLOGY | Facility: CLINIC | Age: 62
End: 2022-04-19
Payer: COMMERCIAL

## 2022-04-19 DIAGNOSIS — F32.0 CURRENT MILD EPISODE OF MAJOR DEPRESSIVE DISORDER WITHOUT PRIOR EPISODE (H): Primary | ICD-10-CM

## 2022-04-19 PROCEDURE — 90837 PSYTX W PT 60 MINUTES: CPT | Mod: 95 | Performed by: SOCIAL WORKER

## 2022-04-19 NOTE — PROGRESS NOTES
[facility]                                     Progress Note    Patient Name: Grant Shoemaker  Date: 4.19.22         Service Type: Individual      Session Start Time: 230pm  Session End Time: 328pm     Session Length: 58 min    Session #: 8    Attendees: Client attended alone    Service Modality:  Video Visit:      Provider verified identity through the following two step process.  Patient provided:  Patient photo    Telemedicine Visit: The patient's condition can be safely assessed and treated via synchronous audio and visual telemedicine encounter.      Reason for Telemedicine Visit: Patient has requested telehealth visit    Originating Site (Patient Location): Patient's home    Distant Site (Provider Location): Provider Remote Setting- Home Office    Consent:  The patient/guardian has verbally consented to: the potential risks and benefits of telemedicine (video visit) versus in person care; bill my insurance or make self-payment for services provided; and responsibility for payment of non-covered services.     Patient would like the video invitation sent by:  Send to e-mail at: [email or web address]    Mode of Communication:  Video Conference via Amwell    As the provider I attest to compliance with applicable laws and regulations related to telemedicine.    DATA  Interactive Complexity: No  Crisis: No        Progress Since Last Session (Related to Symptoms / Goals / Homework):   Symptoms: similar to last session    Homework: Achieved / completed to satisfaction      Episode of Care Goals: Achieved / completed to satisfaction - ACTION (Actively working towards change); Intervened by reinforcing change plan / affirming steps taken     Current / Ongoing Stressors and Concerns: Pt reflected on the news that the state has filed a motion to dismiss his appeal. Pt reflected on how this means that he believes he is ineligible for the supplement employment income from the pandemic. Pt reflected  "on how he is feeling frustrated, down, and flustered. Pt reflected on feeling shocked yet realizing that he has hope because he believes the case will determine that he is eligible, though he reflects on the draining energy that will be in the future that he will need to commit to get an answer. Pt also reflected on a recent interview was told \"we like you, your second so we don't want to offer that to you.\" Processed pt's disappointment, devastation, and loss over this event. Pt also reflected on frustration with his housing damage loss suit with a contractor who was working on his house that had  while working in pt's house. Processed how pt is missing out on purpose in his life and that this is making him less motivated to follow through with everyday activities of living (lanudry, eating, etc). For the remainder of the session, pt reflected on the sadness of missing purpose in his life. We discussed the value of contribution and pt came up with an idea of \"[facility] increase my sense of contribution, adding to my sense of purpose.\"         Treatment Objective(s) Addressed in This Session:          Feeling and thought identification   Practicing solution based therapy  practicing emotive based therapy              Intervention:              ACT therapy, information on cognitive flexibility   Emotional identification, frustration, disappointment, angry, sadness      Assessments completed prior to visit:  The following assessments were completed by patient for this visit:  PHQ9: No flowsheet data found.      ASSESSMENT: Current Emotional / Mental Status (status of significant symptoms):   Risk status (Self / Other harm or suicidal ideation)   Patient denies current fears or concerns for personal safety.   Patient denies current or recent suicidal ideation or behaviors.   Patient denies current or recent homicidal ideation or behaviors.   Patient denies current or recent " "self injurious behavior or ideation.   Patient denies other safety concerns.   Patient reports there has been no change in risk factors since their last session.     Patient reports there has been no change in protective factors since their last session.     Recommended that patient call 911 or go to the local ED should there be a change in any of these risk factors.     Appearance:   Appropriate    Eye Contact:   Good    Psychomotor Behavior: Normal    Attitude:   Cooperative    Orientation:   All   Speech    Rate / Production: Normal/ Responsive Normal     Volume:  Normal    Mood:    Anxious  Sad  Anhedonia   Affect:    Appropriate    Thought Content:  Clear    Thought Form:  Coherent  Logical    Insight:    Fair      Medication Review:   No changes to current psychiatric medication(s)     Medication Compliance:   Yes     Changes in Health Issues:   None reported     Chemical Use Review:   Substance Use: Chemical use reviewed, no active concerns identified      Tobacco Use: No current tobacco use.      Diagnosis:  1. 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 to increase my sense of contribution, adding to my sense of purpose.\"     Doe Kearney, CHUY, [facility], 4.19.22                                                  ______________________________________________________________________    Individual Treatment Plan    Patient's Name: Grant Shoemaker  YOB: 1960    Date of Creation: 3.8.22  Date Treatment Plan Last Reviewed/Revised: 3.8.22    DSM5 Diagnoses: 296.21 (F32.0) Major Depressive Disorder, Single Episode, Mild With anxious distress; r/o obsessive compulsive personality disorder  Psychosocial / Contextual Factors: loss of job  PROMIS (reviewed every 90 days):     Referral / Collaboration:  Referral to " another professional/service is not indicated at this time..    Anticipated number of session for this episode of care: 10-15  Anticipation frequency of session: every 3 weeks  Anticipated Duration of each session: 38-52 minutes  Treatment plan will be reviewed in 90 days or when goals have been changed.     MeasurableTreatment Goal(s) related to diagnosis / functional impairment(s)  Goal 1: Client will decrease his depressive symptoms in half from baseline phq9 score.    I will know I've met my goal when I have more energy and feel genuinely better about myself and respond in ways that are helpful to me in the long run.       Objective #A (Client Action)                Client will review and familiarize himself with feeling words. he will use feeling words to describe when his needs are met/not met.      Status: new - Date: 12.29.21     Intervention(s)  Therapist will assign emotional recognition/identification including when expressing when her needs are not being met or are being met.     Objective #B    Client will Increased understanding of depressive feelings, including developing vocabulary to describe depression and identify cues and symptoms. Client will also identify areas of vulnerability that make his symptoms increase in frequency and intensity.  Status: new - Date: 10.13.21     Intervention(s)  Therapist will provide educational materials on depression.     Objective #C  Client will Increase interest, engagement, and pleasure in doing things  Decrease frequency and intensity of feeling down, depressed, hopeless.  Status: new - Date: 12.29.21     Intervention(s)  Therapist will provide educational materials and handouts on behavioral activation.       Objective #D  Client will Identify negative self-talk and behaviors: challenge core beliefs, myths, and actions.  Status: new - Date: 12.29.21     Intervention(s)  Therapist will provide educational materials and handouts on core beliefs, cognitive  distortions, and ACT, including exploring and identifying important values in patient's life.  Patient has reviewed and agreed to the above plan.        CHUY Ku, LICSW    December 29, 2021

## 2022-04-25 ENCOUNTER — MYC MEDICAL ADVICE (OUTPATIENT)
Dept: INTERNAL MEDICINE | Facility: CLINIC | Age: 62
End: 2022-04-25
Payer: COMMERCIAL

## 2022-04-25 DIAGNOSIS — M79.641 PAIN OF RIGHT HAND: Primary | ICD-10-CM

## 2022-05-06 ENCOUNTER — OFFICE VISIT (OUTPATIENT)
Dept: ORTHOPEDICS | Facility: CLINIC | Age: 62
End: 2022-05-06
Attending: INTERNAL MEDICINE
Payer: COMMERCIAL

## 2022-05-06 ENCOUNTER — VIRTUAL VISIT (OUTPATIENT)
Dept: PSYCHOLOGY | Facility: CLINIC | Age: 62
End: 2022-05-06
Payer: COMMERCIAL

## 2022-05-06 VITALS — BODY MASS INDEX: 35.37 KG/M2 | HEIGHT: 67 IN | DIASTOLIC BLOOD PRESSURE: 72 MMHG | SYSTOLIC BLOOD PRESSURE: 118 MMHG

## 2022-05-06 DIAGNOSIS — M79.641 PAIN OF RIGHT HAND: Primary | ICD-10-CM

## 2022-05-06 DIAGNOSIS — Z98.890 STATUS POST TRIGGER FINGER RELEASE: ICD-10-CM

## 2022-05-06 DIAGNOSIS — Z98.890 S/P CARPAL TUNNEL RELEASE: ICD-10-CM

## 2022-05-06 DIAGNOSIS — G56.01 CARPAL TUNNEL SYNDROME OF RIGHT WRIST: ICD-10-CM

## 2022-05-06 DIAGNOSIS — F32.0 CURRENT MILD EPISODE OF MAJOR DEPRESSIVE DISORDER WITHOUT PRIOR EPISODE (H): Primary | ICD-10-CM

## 2022-05-06 PROCEDURE — 90837 PSYTX W PT 60 MINUTES: CPT | Mod: 95 | Performed by: SOCIAL WORKER

## 2022-05-06 PROCEDURE — 99204 OFFICE O/P NEW MOD 45 MIN: CPT | Performed by: STUDENT IN AN ORGANIZED HEALTH CARE EDUCATION/TRAINING PROGRAM

## 2022-05-06 NOTE — LETTER
5/6/2022         RE: Grant Shoemaker  [address] [address]        Dear Colleague,    Thank you for referring your patient, Grant Shoemaker, to the [facility] [place]. Please see a copy of my visit note below.    ASSESSMENT & PLAN    1. Pain of right hand    2. Carpal tunnel syndrome of right wrist    3. Status post carpal tunnel release    4. Status post trigger finger release      Andrew Shoemaker is a 61 year old right-handed male with history of right wrist carpal tunnel release and multiple trigger finger releases presenting for evaluation of new and worsening pain over the palmar aspect of the right hand/wrist. History and exam are concerning for possible recurrence of carpal tunnel syndrome with positive provocative testing on exam today. Differential includes anterior interosseous syndrome. Exam is also suggestive of flexor tendinosis with pain and weakness with resisted flexion of the index and middle fingers.     - Due to previous surgical history, I recommend proceeding with advanced imaging (MRI) to further evaluate for soft tissue scarring, nerve entrapment or flexor tendon injury. Your MRI has been ordered. You may call [phone] to schedule.     - I also recommend starting Hand Therapy and nighttime wrist bracing.   - May use heat/ice or NSAIDS (Ibuprofen) as needed for discomfort.   - Avoid activities that provoke pain.     Please schedule a follow up appointment to see me after your MRI, or sooner as needed for persistence or worsening of pain. You may call our direct clinic number ([phone]) at any time with questions or concerns.    Tiff Joya MD, [facility]    -----    SUBJECTIVE  Grant Shoemaker is a/an 61 year old Right handed male who is seen in consultation at the request of  Sheridan Mistry M.D. for evaluation of right hand pain. The patient is seen by themselves.    Onset: 1  "month(s) ago. Reports insidious onset without acute precipitating event.  Location of Pain: right hand - palm  Rating of Pain at worst: 3/10  Rating of Pain Currently: 1.5/10  Worsened by: stiffness in the morning, writing, will wake up with the discomfort as well as numbness/tingling in the palm/hand at night  Better with: nothing  Treatments tried: rest/activity avoidance and Tylenol, home exercises  Associated symptoms: decreased ROM of the index and middle fingers with slight weakness. No triggering, swelling, mass.   Orthopedic history: YES - multiple trigger fingers on right and left hand  Relevant surgical history: YES - multiple right trigger finger releases, right CTR  Social history: unemployed - seeking work    Past Medical History:   Diagnosis Date     Adenomatous polyp of colon      Hyperlipidemia      Hypertension      Hypothyroidism      ANN (obstructive sleep apnea)      Social History     Socioeconomic History     Marital status:    Tobacco Use     Smoking status: Never Smoker     Smokeless tobacco: Never Used   Substance and Sexual Activity     Alcohol use: Yes     Drug use: Never     Sexual activity: Not Currently         Patient's past medical, surgical, social, and family histories were reviewed today and no changes are noted.    REVIEW OF SYSTEMS:  10 point ROS is negative other than symptoms noted above in HPI, Past Medical History or as stated below  Constitutional: NEGATIVE for fever, chills, change in weight  Skin: NEGATIVE for worrisome rashes, moles or lesions  GI/: NEGATIVE for bowel or bladder changes  Neuro: NEGATIVE for weakness, dizziness or paresthesias    OBJECTIVE:  /72   Ht 1.702 m (5' 7\")   BMI 35.37 kg/m     General: healthy, alert and in no distress  HEENT: no scleral icterus or conjunctival erythema  Skin: no suspicious lesions or rash. No jaundice.  CV: regular rhythm by palpation  Resp: normal respiratory effort without conversational dyspnea   Psych: " normal mood and affect  Gait: normal steady gait with appropriate coordination and balance  Neuro: normal light touch sensory exam of the bilateral hands.    MSK:  RIGHT HAND & WRIST  Inspection:    No swelling, bruising, discoloration, or obvious deformity or asymmetry  Palpation:    Tender about the carpal tunnel and flexor tendons (index and middle) in the palm. Crepitus noted over the previous A1 pulley site of the index finger without triggering. Remainder of bony and ligamentous line marks are nontender.  Range of Motion:    Reduced flexion at the MCP and PIP joints of the right index and middle fingers due to stiffness. Otherwise Full active flexion and extension at MCP, PIP, and DIP joints; normal finger cascade without malrotation. Slightly reduced wrist flexion and extension due to stiffness.  Wrist pronation, supination, and ulnar/radial deviation normal.  Strength:    4+/5 flexion of the index and middle fingers. Full extension strength. Otherwise full flexion, extension, abduction, adduction, opposition. 5-/5 wrist flexion and extension. Full biceps and triceps strength.   Special Tests:    Positive: Phalen's, Tinel's (carpal tunnel)    Negative: Tinel's (cubital tunnel), Finkelstein's, CMC grind, palpable triggering, extensor lag at DIP, thenar eminence wasting, hypothenar eminence wasting, flexor digitorum superficialis testing, flexor digitorum profundus testing      Tiff Joya MD [facility]        Again, thank you for allowing me to participate in the care of your patient.        Sincerely,        Tiff Joya MD

## 2022-05-06 NOTE — PROGRESS NOTES
ASSESSMENT & PLAN    1. Pain of right hand    2. Carpal tunnel syndrome of right wrist    3. Status post carpal tunnel release    4. Status post trigger finger release      Andrew Shoemaker is a 61 year old right-handed male with history of right wrist carpal tunnel release and multiple trigger finger releases presenting for evaluation of new and worsening pain over the palmar aspect of the right hand/wrist. History and exam are concerning for possible recurrence of carpal tunnel syndrome with positive provocative testing on exam today. Differential includes anterior interosseous syndrome. Exam is also suggestive of flexor tendinosis with pain and weakness with resisted flexion of the index and middle fingers.     - Due to previous surgical history, I recommend proceeding with advanced imaging (MRI) to further evaluate for soft tissue scarring, nerve entrapment or flexor tendon injury. Your MRI has been ordered. You may call [phone] to schedule.     - I also recommend starting Hand Therapy and nighttime wrist bracing.   - May use heat/ice or NSAIDS (Ibuprofen) as needed for discomfort.   - Avoid activities that provoke pain.     Please schedule a follow up appointment to see me after your MRI, or sooner as needed for persistence or worsening of pain. You may call our direct clinic number ([phone]) at any time with questions or concerns.    Tiff Joya MD, [facility]    -----    SUBJECTIVE  Grant Shoemaker is a/an 61 year old Right handed male who is seen in consultation at the request of  Sheridan Mistry M.D. for evaluation of right hand pain. The patient is seen by themselves.    Onset: 1 month(s) ago. Reports insidious onset without acute precipitating event.  Location of Pain: right hand - palm  Rating of Pain at worst: 3/10  Rating of Pain Currently: 1.5/10  Worsened by: stiffness in the morning, writing, will wake up with the discomfort as well as  "numbness/tingling in the palm/hand at night  Better with: nothing  Treatments tried: rest/activity avoidance and Tylenol, home exercises  Associated symptoms: decreased ROM of the index and middle fingers with slight weakness. No triggering, swelling, mass.   Orthopedic history: YES - multiple trigger fingers on right and left hand  Relevant surgical history: YES - multiple right trigger finger releases, right CTR  Social history: unemployed - seeking work    Past Medical History:   Diagnosis Date     Adenomatous polyp of colon      Hyperlipidemia      Hypertension      Hypothyroidism      ANN (obstructive sleep apnea)      Social History     Socioeconomic History     Marital status:    Tobacco Use     Smoking status: Never Smoker     Smokeless tobacco: Never Used   Substance and Sexual Activity     Alcohol use: Yes     Drug use: Never     Sexual activity: Not Currently         Patient's past medical, surgical, social, and family histories were reviewed today and no changes are noted.    REVIEW OF SYSTEMS:  10 point ROS is negative other than symptoms noted above in HPI, Past Medical History or as stated below  Constitutional: NEGATIVE for fever, chills, change in weight  Skin: NEGATIVE for worrisome rashes, moles or lesions  GI/: NEGATIVE for bowel or bladder changes  Neuro: NEGATIVE for weakness, dizziness or paresthesias    OBJECTIVE:  /72   Ht 1.702 m (5' 7\")   BMI 35.37 kg/m     General: healthy, alert and in no distress  HEENT: no scleral icterus or conjunctival erythema  Skin: no suspicious lesions or rash. No jaundice.  CV: regular rhythm by palpation  Resp: normal respiratory effort without conversational dyspnea   Psych: normal mood and affect  Gait: normal steady gait with appropriate coordination and balance  Neuro: normal light touch sensory exam of the bilateral hands.    MSK:  RIGHT HAND & WRIST  Inspection:    No swelling, bruising, discoloration, or obvious deformity or " asymmetry  Palpation:    Tender about the carpal tunnel and flexor tendons (index and middle) in the palm. Crepitus noted over the previous A1 pulley site of the index finger without triggering. Remainder of bony and ligamentous line marks are nontender.  Range of Motion:    Reduced flexion at the MCP and PIP joints of the right index and middle fingers due to stiffness. Otherwise Full active flexion and extension at MCP, PIP, and DIP joints; normal finger cascade without malrotation. Slightly reduced wrist flexion and extension due to stiffness.  Wrist pronation, supination, and ulnar/radial deviation normal.  Strength:    4+/5 flexion of the index and middle fingers. Full extension strength. Otherwise full flexion, extension, abduction, adduction, opposition. 5-/5 wrist flexion and extension. Full biceps and triceps strength.   Special Tests:    Positive: Phalen's, Tinel's (carpal tunnel)    Negative: Tinel's (cubital tunnel), Finkelstein's, CMC grind, palpable triggering, extensor lag at DIP, thenar eminence wasting, hypothenar eminence wasting, flexor digitorum superficialis testing, flexor digitorum profundus testing      Tiff Joya MD [facility]

## 2022-05-06 NOTE — PATIENT INSTRUCTIONS
1. Pain of right hand    2. Carpal tunnel syndrome of right wrist    3. Status post carpal tunnel release    4. Status post trigger finger release      Andrew Shoemaker is a 61 year old right-handed male with history of right wrist carpal tunnel release and multiple trigger finger releases presenting for evaluation of new and worsening pain over the palmar aspect of the right hand/wrist. History and exam are concerning for possible recurrence of carpal tunnel syndrome. Exam is also suggestive of flexor tendinosis with pain and weakness with resisted flexion of the index and middle fingers.     - Due to previous surgical history, I recommend proceeding with advanced imaging (MRI) to further evaluate for soft tissue scarring, nerve entrapment or flexor tendon injury. Your MRI has been ordered. You may call [phone] to schedule.     - I also recommend starting Hand Therapy and nighttime wrist bracing.   - May use heat/ice or NSAIDS (Ibuprofen or diclofenac gel) as needed for discomfort.   - Avoid activities that provoke pain.     Please schedule a follow up appointment to see me after your MRI, or sooner as needed for persistence or worsening of pain. You may call our direct clinic number ([phone]) at any time with questions or concerns.    Tiff Joya MD, [facility]

## 2022-05-06 NOTE — PROGRESS NOTES
"[facility]                                     Progress Note    Patient Name: Grant Shoemaker  Date: 5.6.22         Service Type: Individual      Session Start Time: 959am  Session End Time: 1058am     Session Length: 58 min    Session #: 9    Attendees: Client attended alone    Service Modality:  Video Visit:      Provider verified identity through the following two step process.  Patient provided:  Patient photo    Telemedicine Visit: The patient's condition can be safely assessed and treated via synchronous audio and visual telemedicine encounter.      Reason for Telemedicine Visit: Patient has requested telehealth visit    Originating Site (Patient Location): Patient's home    Distant Site (Provider Location): Provider Remote Setting- Home Office    Consent:  The patient/guardian has verbally consented to: the potential risks and benefits of telemedicine (video visit) versus in person care; bill my insurance or make self-payment for services provided; and responsibility for payment of non-covered services.     Patient would like the video invitation sent by:  Send to e-mail at: [email or web address]    Mode of Communication:  Video Conference via [employer]    As the provider I attest to compliance with applicable laws and regulations related to telemedicine.    DATA  Interactive Complexity: No  Crisis: No        Progress Since Last Session (Related to Symptoms / Goals / Homework):   Symptoms: similar to last session    Homework: Achieved / completed to satisfaction      Episode of Care Goals: Achieved / completed to satisfaction - ACTION (Actively working towards change); Intervened by reinforcing change plan / affirming steps taken     Current / Ongoing Stressors and Concerns: Pt reflected on \"it has been a very frustrating week.\" He reflected on events occurring over his week. He reflected on having a job interview last Thursday and will hear back today on the results of this interview. " "He reflected on the ongoing stress of depleting his financial resources due to being unemployed for a long period of time. He also reflected on the appeal being resolved that \"I am not comfortable with\", incuding the  finding he is ineligible. This writer helped hold space to validate pt's emotional response to the ongoing external stress in his life. This writer helped to validate frustration, disappointment, helplessness, and powerlessness. This writer encouraged pt to voice the thoughts he was having that \"I was mad at myself\" using the ACT cognitive defusion strategy of \"I am having the thought that\" to establish some flexibility that his thoughts may or may not have truth/importance and yet he is allowing himself to have these thoughts. Pt reflected on making the toward move and taking the tape out of the box \"so it would be easier for me to make it easier.\" Encouraged pt to celebrate his ability to get this far and remind himself that next time he may have the strength/courage to move forward to the next steps. Pt also reflected on making the appointment and attending giving blood at a blood bank. Pt reflected on how this action was demonstrating that he has a purpose. For the remainder of the session, discussed pt's urge to want to make an impact in his community to where others lives are at stake and to persistence.      Treatment Objective(s) Addressed in This Session:          Feeling and thought identification   Practicing solution based therapy  practicing emotive based therapy              Intervention:              ACT therapy, information on cognitive flexibility   Emotional identification, frustration, disappointment, angry, sadness      Assessments completed prior to visit:  The following assessments were completed by patient for this visit:  PHQ9: No flowsheet data found.      ASSESSMENT: Current Emotional / Mental Status (status of significant symptoms):   Risk status (Self / Other harm or " suicidal ideation)   Patient denies current fears or concerns for personal safety.   Patient denies current or recent suicidal ideation or behaviors.   Patient denies current or recent homicidal ideation or behaviors.   Patient denies current or recent self injurious behavior or ideation.   Patient denies other safety concerns.   Patient reports there has been no change in risk factors since their last session.     Patient reports there has been no change in protective factors since their last session.     Recommended that patient call 911 or go to the local ED should there be a change in any of these risk factors.     Appearance:   Appropriate    Eye Contact:   Good    Psychomotor Behavior: Normal    Attitude:   Cooperative    Orientation:   All   Speech    Rate / Production: Normal/ Responsive Normal     Volume:  Normal    Mood:    Anxious  Sad  Anhedonia   Affect:    Appropriate    Thought Content:  Clear    Thought Form:  Coherent  Logical    Insight:    Fair      Medication Review:   No changes to current psychiatric medication(s)     Medication Compliance:   Yes     Changes in Health Issues:   None reported     Chemical Use Review:   Substance Use: Chemical use reviewed, no active concerns identified      Tobacco Use: No current tobacco use.      Diagnosis:  1. Current mild episode of major depressive disorder without prior episode (H)      Collateral Reports Completed:   Not Applicable    PLAN: (Patient Tasks / Therapist Tasks / Other): Pt will reflect on recognizing that his core values may be shifting over the course of his life and depending on what experiences he has in life.    Doe Kearney, CHUY, [facility], 5.6.22                                                  ______________________________________________________________________    Individual Treatment Plan    Patient's Name: Grant Shoemaker  YOB: 1960    Date of Creation: 3.8.22  Date Treatment Plan Last Reviewed/Revised:  3.8.22    DSM5 Diagnoses: 296.21 (F32.0) Major Depressive Disorder, Single Episode, Mild With anxious distress; r/o obsessive compulsive personality disorder  Psychosocial / Contextual Factors: loss of job  PROMIS (reviewed every 90 days):     Referral / Collaboration:  Referral to another professional/service is not indicated at this time..    Anticipated number of session for this episode of care: 10-15  Anticipation frequency of session: every 3 weeks  Anticipated Duration of each session: 38-52 minutes  Treatment plan will be reviewed in 90 days or when goals have been changed.     MeasurableTreatment Goal(s) related to diagnosis / functional impairment(s)  Goal 1: Client will decrease his depressive symptoms in half from baseline phq9 score.    I will know I've met my goal when I have more energy and feel genuinely better about myself and respond in ways that are helpful to me in the long run.       Objective #A (Client Action)                Client will review and familiarize himself with feeling words. he will use feeling words to describe when his needs are met/not met.      Status: new - Date: 12.29.21     Intervention(s)  Therapist will assign emotional recognition/identification including when expressing when her needs are not being met or are being met.     Objective #B    Client will Increased understanding of depressive feelings, including developing vocabulary to describe depression and identify cues and symptoms. Client will also identify areas of vulnerability that make his symptoms increase in frequency and intensity.  Status: new - Date: 10.13.21     Intervention(s)  Therapist will provide educational materials on depression.     Objective #C  Client will Increase interest, engagement, and pleasure in doing things  Decrease frequency and intensity of feeling down, depressed, hopeless.  Status: new - Date: 12.29.21     Intervention(s)  Therapist will provide educational materials and handouts on  behavioral activation.       Objective #D  Client will Identify negative self-talk and behaviors: challenge core beliefs, myths, and actions.  Status: new - Date: 12.29.21     Intervention(s)  Therapist will provide educational materials and handouts on core beliefs, cognitive distortions, and ACT, including exploring and identifying important values in patient's life.  Patient has reviewed and agreed to the above plan.        CHUY Ku, LICSW    December 29, 2021

## 2022-05-10 ENCOUNTER — IMMUNIZATION (OUTPATIENT)
Dept: NURSING | Facility: CLINIC | Age: 62
End: 2022-05-10
Payer: COMMERCIAL

## 2022-05-10 PROCEDURE — 0054A COVID-19,PF,PFIZER (12+ YRS): CPT

## 2022-05-10 PROCEDURE — 91305 COVID-19,PF,PFIZER (12+ YRS): CPT

## 2022-05-18 ENCOUNTER — THERAPY VISIT (OUTPATIENT)
Dept: OCCUPATIONAL THERAPY | Facility: CLINIC | Age: 62
End: 2022-05-18
Attending: STUDENT IN AN ORGANIZED HEALTH CARE EDUCATION/TRAINING PROGRAM
Payer: COMMERCIAL

## 2022-05-18 DIAGNOSIS — M79.641 PAIN OF RIGHT HAND: ICD-10-CM

## 2022-05-18 DIAGNOSIS — Z98.890 S/P CARPAL TUNNEL RELEASE: ICD-10-CM

## 2022-05-18 DIAGNOSIS — Z98.890 STATUS POST TRIGGER FINGER RELEASE: ICD-10-CM

## 2022-05-18 DIAGNOSIS — M25.641 STIFFNESS OF FINGER JOINT, RIGHT: ICD-10-CM

## 2022-05-18 DIAGNOSIS — G56.01 CARPAL TUNNEL SYNDROME OF RIGHT WRIST: ICD-10-CM

## 2022-05-18 DIAGNOSIS — M79.644 PAIN OF FINGER OF RIGHT HAND: ICD-10-CM

## 2022-05-18 PROCEDURE — 97165 OT EVAL LOW COMPLEX 30 MIN: CPT | Mod: GO | Performed by: OCCUPATIONAL THERAPIST

## 2022-05-18 PROCEDURE — 97110 THERAPEUTIC EXERCISES: CPT | Mod: GO | Performed by: OCCUPATIONAL THERAPIST

## 2022-05-18 NOTE — PROGRESS NOTES
[facility]    OUTPATIENT Occupational Therapy ORTHOPEDIC EVALUATION  PLAN OF TREATMENT FOR OUTPATIENT REHABILITATION  (COMPLETE FOR INITIAL CLAIMS ONLY)  Patient's Last Name, First Name, M.I.  YOB: 1960  Grant Shoemaker    Provider s Name:  [facility]   Medical Record No.  [id number]   Start of Care Date:  05/18/22   Onset Date:   05/06/22 (MD order)   Type:     __PT   __x_OT Medical Diagnosis:    Encounter Diagnoses   Name Primary?    Pain of right hand     Status post carpal tunnel release     Status post trigger finger release     Carpal tunnel syndrome of right wrist     Pain of finger of right hand     Stiffness of finger joint, right         Treatment Diagnosis:  Pain of right hand, S/P carpal tunnel release, Status post trigger finger release, Carpal tunnel syndrome of right wrist        Goals:     05/18/22 0500   Goal #1   Goal #1 household chores   Previous Performance Level Independent   Current Functional Task    Current Performance Level 3.5/10 pain   STG [employer] Open a tight or new jar   STG [employer] Perform Level 1/0 pain   Due Date 06/16/22   LTG Target Task/Performance Pain free household chores   Due Date 07/16/22       Therapy Frequency:  1x per week  Predicted Duration of Therapy Intervention:  6 weeks    RODOLFO ALATORRE OT                 I CERTIFY THE NEED FOR THESE SERVICES FURNISHED UNDER        THIS PLAN OF TREATMENT AND WHILE UNDER MY CARE     (Physician attestation of this document indicates review and certification of the therapy plan).                     Certification Date From:  05/18/22   Certification Date To:  06/29/22    Referring Provider:  Tiff Joya    Initial Assessment        See Epic Evaluation SOC Date: 05/18/22

## 2022-05-18 NOTE — PROGRESS NOTES
Hand Therapy Initial Evaluation    Current Date:  5/18/2022    Diagnosis:   Pain of right hand   S/P carpal tunnel release   Status post trigger finger release   Carpal tunnel syndrome of right wrist     DOI: 5/6/22 MD order, noticing since about one month ago   DOS: NA (Prior surgeries, few years prior)   Procedure:  NA    Precautions: None noted     Subjective:  Grant Shoemaker is a 61 year old male.    Patient reports symptoms of the right IF and MF which occurred due to unknown cause. Since onset symptoms are Gradually getting worse.      Answers for HPI/ROS submitted by the patient on 5/18/2022  Reason for Visit:: Hand pain - centered around flexor tendons  How problem occurred:: It gradually developed a little over a month ago.  Number scale: 2/10  General health as reported by patient: good  Please check all that apply to your current or past medical history: high blood pressure, overweight, sleep disorder/apnea, thyroid problems  Medical allergies: none  Surgeries: orthopedic surgery, other  Other Surgery Detail: back (cervical cervical discectomy and fusion, and laminectomy without fusion)  Medications you are currently taking: high blood pressure medication, thyroid medication, other  Other Meds Detail: cholesterol, vitamin D, calcium supplement  Occupation:: unemployed  What are your primary job tasks: computer work, driving, prolonged sitting      Occupational Profile Information:  Right hand dominant  Prior functional level:  no limitations  Patient reports symptoms of pain, stiffness/loss of motion, weakness/loss of strength, edema, numbness and tingling   Special tests:  None noted.    Previous treatment: For prior trigger releases.   Barriers include:none  Mobility: No difficulty  Transportation: TBD  Currently not working   Leisure activities/hobbies: TBD   Other: R trigger finger prior releases of thumb, IF, LF, RF - L trigger releases of LF, RF, SF. Years from 1990 -   CTR R >5 years ago. Has  "tendonitis in R shoulder and arm. Has been wearing wrist brace at night - OTC.     Functional Outcome Measure:   Upper Extremity Functional Index Score:  SCORE:   Column Totals: /80: (P) 71   (A lower score indicates greater disability.)      Objective:  Pain Level (Scale 0-10):   5/18/2022   At Rest 1   With Use 3.5     Pain Description:  Date 5/18/2022   Location Palm, radial    Pain Quality Aching, Burning, Dull, Nagging, Tender and stiffness in am    Frequency constant     Pain is worst  daytime   Exacerbated by  Moving    Relieved by otc medications and rest   Progression Worse      Posture  Normal    Edema (Circumference measured in cm)   5/18/2022 5/18/2022 05/18/22 05/18/22      R L R L    IF IF LF  LF    P1 7.4 6.8 7.4 7   PIP 7.0 6.4 7.4 6.8   P2  6.2 5.8 6.3 6.6     Mild edema noted in R digits 4-5     Sensation  Decreased Median Nerve distribution per pt report - mild burning at times. Dulled in in R digits 1 and 2.     ROM  Pain Report: - none  + mild    ++ moderate    +++ severe   Wrist 5/18/2022 5/18/2022   AROM (PROM) L R   Extension 60 60   Flexion 45 30   RD 30 15 (discomfort in CT, pinching)   UD 30 30     Special Tests  Pain Report:  - none    + mild    ++ moderate    +++ severe    5/18/2022   Median Nerve Compression at Pronator    Carpal Compression Test--Durkan Test (30 sec) -   Live Test for Lumbrical Incursion  (fist x 30 secs) + in digits 2-3    Tinels at Carpal Tunnel -   Phalens + proximal t to CT pinch      B thumb AROM WFL  B 4th and 5th digit AROM WFL     ROM  Hand 5/18/2022 5/18/2022   AROM(PROM) L R   Index MP 0/85 0/75   PIP 0/80 0/65   DIP 0/65 -5/55   JAMES     Long MP 0/90 0/80   PIP 0/75 0/65   DIP 0/73 -10/50   JAMES         Neural Tension Testing  MNT: Median Neurodynamic Test (based on DS Constantine's ULNT)   5/18/2022   0-5 Scale 5 S1 \"stretching\"   Position:   0/5: Arm across abdomen in coronal plane  1/5: Depress shoulder, ER to neutral ABD shoulder to 45 degrees  2/5: ER " shoulder to end range, keep elbow at 90 degrees  3/5: Extend elbow to 0 degrees  4/5: Fully supinate forearm  5/5: Extend wrist, fingers and thumb  Notes:    (+) indicates beyond grade level but less than senior care to next level    (-) indicates over senior care to level    S1  onset/change of patient's symptoms    S2 definite stop point based on patient's discomfort level    Strength   (Measured in pounds)  Pain Report: - none  + mild    ++ moderate    +++ severe    5/18/2022 5/18/2022   Trials L R   1  2  3 30 30 +    Average       Lat Pinch 5/18/2022 5/18/2022   Trials L R   1  2  3 18 19   Average       3 Pt Pinch 5/18/2022 5/18/2022   Trials L R   1  2  3 19 8.5 +   Average       FDP is weak compared to FDS in digits 2-3     Assessment:  Patient presents with symptoms consistent with diagnosis noted above, with conservative intervention.     Patient's limitations or Problem List includes:  Pain, Decreased ROM/motion, Increased edema, Weakness, Sensory disturbance, Hypomobility, Decreased , Decreased pinch and Decreased coordination of the right hand, index finger and long finger which interferes with the patient's ability to perform Work Tasks, Sleep Patterns, Recreational Activities and Household Chores as compared to previous level of function.    Rehab Potential:  Good - Return to full activity, some limitations    Patient will benefit from skilled Occupational Therapy to increase ROM, flexibility, motion, overall strength,  strength, pinch strength, coordination, dexterity and sensation and decrease pain and edema to return to previous activity level and resume normal daily tasks and to reach their rehab potential.    Barriers to Learning:  No barrier    Communication Issues:  Patient appears to be able to clearly communicate and understand verbal and written communication and follow directions correctly.    Chart Review: Chart Review and Simple history review with patient    Identified Performance  Deficits: health management and maintenance, home establishment and management, meal preparation and cleanup, shopping, sleep and leisure activities    Assessment of Occupational Performance:  1-3 Performance Deficits    Clinical Decision Making (Complexity): Low complexity    Treatment Explanation:  The following has been discussed with the patient:  RX ordered/plan of care  Anticipated outcomes  Possible risks and side effects    Plan:  Frequency:  1 X week, once daily  Duration:  for 6 weeks    Treatment Plan:    Modalities:    US, Iontophoresis, Fluidotherapy, Paraffin and E-Stim   Therapeutic Exercise:    AROM, AAROM, PROM, Tendon Gliding, Blocking, Reverse Blocking, Extensor Tracking, Isotonics, Isometrics and Stabilization  Neuromuscular re-ed:   Nerve Gliding, Coordination/Dexterity, Sensory re-education, Posture, Kinesiotaping, Strain Counter Strain, Isometrics and Stabilization  Manual Techniques:   Coordination/Dexterity, Joint mobilization, Scar mobilization, Friction massage, Myofascial release and Manual edema mobilization  Orthotic Fabrication:    Static, Static progressive and Dynamic  Self Care:    Self Care Tasks, Ergonomic Considerations, Community Transportation and Work Tasks    Discharge Plan:  Achieve all LTG.  Independent in home treatment program.  Reach maximal therapeutic benefit.    Home Exercise Program:  PTRX Report  The following values have been sent to [facility].  Finger Active Range of Motion Tendon Glides Fist Series  EMR Notes  HEP - Sets  Reps 5-10  Sessions per day 2-3  Notes  Finger Active Range of Motion FDS Flexor Tendon Gliding  EMR Notes  HEP - Sets  Reps 5-10  Sessions per day 2-3  Notes Just index and middle. Most imprortant.  Finger Active Range of Motion DIP Joint Blocking  EMR Notes  HEP - Sets  Reps 5-10  Sessions per day 2-3  Notes Just index and middle. Most important.  Forearm Passive Range of Motion Flexor Stretch  EMR Notes  HEP - Sets  Reps 3-4  Sessions per day  2-3  Notes 15 sec hold  Forearm Passive Range of Motion Extensor Stretch  EMR Notes  HEP - Sets  Reps 3-4  Sessions per day 2-3  Notes 15 sec hold  Icing  EMR Notes  HEP - Sets  Reps  Sessions per day  Notes  Education Sheet General  EMR Notes  HEP - Sets  Reps  Sessions per day 2  Notes Gentle massage of fingers and hand toward body. 2-5 min sessions. 2x per day. Gentle wrapping of fingers using coban at night.  [place] Massage to Thumb  EMR Notes  HEP - Sets  Reps  Sessions per day  Notes As needed.  Ball Massage to Flexors  EMR Notes  HEP - Sets  Reps  Sessions per day 2  Notes 5 minutes each. 2x per day. Can use double ball on table. PRN.  Ball Massage to Extensors  EMR Notes  HEP - Sets  Reps  Sessions per day 2  Notes 5 minutes each. 2x per day. Can use double ball on table. PRN.      Next Visit:  Assess leisure   Assess HEP   Massage   A/AA/PROM

## 2022-05-19 ENCOUNTER — VIRTUAL VISIT (OUTPATIENT)
Dept: PSYCHOLOGY | Facility: CLINIC | Age: 62
End: 2022-05-19
Payer: COMMERCIAL

## 2022-05-19 DIAGNOSIS — F32.1 MODERATE MAJOR DEPRESSION, SINGLE EPISODE (H): Primary | ICD-10-CM

## 2022-05-19 PROCEDURE — 90837 PSYTX W PT 60 MINUTES: CPT | Mod: 95 | Performed by: SOCIAL WORKER

## 2022-05-19 NOTE — PROGRESS NOTES
[facility]                                     Progress Note    Patient Name: Grant Shoemaker  Date: 5.19.22         Service Type: Individual      Session Start Time: 130pm  Session End Time: 228pm     Session Length: 58 min    Session #: 10    Attendees: Client attended alone    Service Modality:  Video Visit:      Provider verified identity through the following two step process.  Patient provided:  Patient photo    Telemedicine Visit: The patient's condition can be safely assessed and treated via synchronous audio and visual telemedicine encounter.      Reason for Telemedicine Visit: Patient has requested telehealth visit    Originating Site (Patient Location): Patient's home    Distant Site (Provider Location): Provider Remote Setting- Home Office    Consent:  The patient/guardian has verbally consented to: the potential risks and benefits of telemedicine (video visit) versus in person care; bill my insurance or make self-payment for services provided; and responsibility for payment of non-covered services.     Patient would like the video invitation sent by:  Send to e-mail at: [email or web address]    Mode of Communication:  Video Conference via [employer]    As the provider I attest to compliance with applicable laws and regulations related to telemedicine.    DATA  Extended Session (53+ minutes): PROLONGED SERVICE IN THE OUTPATIENT SETTING REQUIRING DIRECT (FACE-TO-FACE) PATIENT CONTACT BEYOND THE USUAL SERVICE:    - Longer session due to limited access to mental health appointments and necessity to address patient's distress / complexity  Interactive Complexity: No  Crisis: No   progress Since Last Session (Related to Symptoms / Goals / Homework):   Symptoms: similar to last session    Homework: Achieved / completed to satisfaction      Episode of Care Goals: Achieved / completed to satisfaction - ACTION (Actively working towards change); Intervened by reinforcing change plan /  "affirming steps taken     Current / Ongoing Stressors and Concerns: Pt reflected that he discovered the court of appeals had been denied. He stated, \"I lost and I don't know why.\" Pt reflected on the specifics of the court findings that bothers him. Pt reflected on the emotion of devastation, betrayal by the system and rattled. He stated \"I've thing I learned about how the government operates, it didn't work.\" He reflected on two options, 1. Accept that I lost money, it's a benefit I didn't receive, I need to concentrate on finding a job; 2. This isn't right, I am entitled to this benefit and this help and apply to the supreme court. Pt reflected on the court documents that stated \"he was fired for making offensive jokes.\" He stated the he remembers a customer telling him an offensive joke and another customer overheard though in the report it was assumed that he told the joke which was not true. This writer asked pt if he had thought about talking to another  to help him think through things. He stated that he is thinking about this but would like to go through the court case logic first. He reflected over the past few days feeling distracted by trying to get ahold of contractors to work on his home for an home insurance claim, as well as applying for jobs. Encouraged pt to focus on how this was not a waste, that he drafted several briefs which he can use as samples when looking for a job, and encouraged pt over the next few days to connect with what matters: Writing and crafting fortune cookies for his daughter, watch a movie, read a book, walk his dog.     Treatment Objective(s) Addressed in This Session:          Feeling and thought identification   Practicing solution based therapy  practicing emotive based therapy              Intervention:              ACT therapy, information on cognitive flexibility   Emotional identification, frustration, disappointment, angry, sadness      Assessments completed " prior to visit:  The following assessments were completed by patient for this visit:  PHQ9: No flowsheet data found.      ASSESSMENT: Current Emotional / Mental Status (status of significant symptoms):   Risk status (Self / Other harm or suicidal ideation)   Patient denies current fears or concerns for personal safety.   Patient denies current or recent suicidal ideation or behaviors.   Patient denies current or recent homicidal ideation or behaviors.   Patient denies current or recent self injurious behavior or ideation.   Patient denies other safety concerns.   Patient reports there has been no change in risk factors since their last session.     Patient reports there has been no change in protective factors since their last session.     Recommended that patient call 911 or go to the local ED should there be a change in any of these risk factors.     Appearance:   Appropriate    Eye Contact:   Good    Psychomotor Behavior: Normal    Attitude:   Cooperative    Orientation:   All   Speech    Rate / Production: Normal/ Responsive Normal     Volume:  Normal    Mood:    Anxious  Sad  Anhedonia   Affect:    Appropriate    Thought Content:  Clear    Thought Form:  Coherent  Logical    Insight:    Fair      Medication Review:   No changes to current psychiatric medication(s)     Medication Compliance:   Yes     Changes in Health Issues:   None reported     Chemical Use Review:   Substance Use: Chemical use reviewed, no active concerns identified      Tobacco Use: No current tobacco use.      Diagnosis:  1. Moderate major depression, single episode (H)    r/o obsessive compulsive personality disorder  Collateral Reports Completed:   Not Applicable    PLAN: (Patient Tasks / Therapist Tasks / Other): pt to focus on how this was not a waste, that he drafted several briefs which he can use as samples when looking for a job, and encouraged pt over the next few days to connect with what matters: Writing and crafting fortune  cookies for his daughter, watch a movie, read a book, walk his dog.    Doe Kearney, MSW, [facility], 5.19.22                                                  ______________________________________________________________________    Individual Treatment Plan    Patient's Name: Grant Shoemaker  YOB: 1960    Date of Creation: 3.8.22  Date Treatment Plan Last Reviewed/Revised: 3.8.22    DSM5 Diagnoses: 296.21 (F32.0) Major Depressive Disorder, Single Episode, Mild With anxious distress; r/o obsessive compulsive personality disorder  Psychosocial / Contextual Factors: loss of job, financial stress, court stress  PROMIS (reviewed every 90 days):     Referral / Collaboration:  Referral to another professional/service is not indicated at this time..    Anticipated number of session for this episode of care: 10-15  Anticipation frequency of session: every 3 weeks  Anticipated Duration of each session: 38-52 minutes  Treatment plan will be reviewed in 90 days or when goals have been changed.     MeasurableTreatment Goal(s) related to diagnosis / functional impairment(s)  Goal 1: Client will decrease his depressive symptoms in half from baseline phq9 score.    I will know I've met my goal when I have more energy and feel genuinely better about myself and respond in ways that are helpful to me in the long run.       Objective #A (Client Action)                Client will review and familiarize himself with feeling words. he will use feeling words to describe when his needs are met/not met.      Status: new - Date: 12.29.21     Intervention(s)  Therapist will assign emotional recognition/identification including when expressing when her needs are not being met or are being met.     Objective #B    Client will Increased understanding of depressive feelings, including developing vocabulary to describe depression and identify cues and symptoms. Client will also identify areas of vulnerability that make his  symptoms increase in frequency and intensity.  Status: new - Date: 10.13.21     Intervention(s)  Therapist will provide educational materials on depression.     Objective #C  Client will Increase interest, engagement, and pleasure in doing things  Decrease frequency and intensity of feeling down, depressed, hopeless.  Status: new - Date: 12.29.21     Intervention(s)  Therapist will provide educational materials and handouts on behavioral activation.       Objective #D  Client will Identify negative self-talk and behaviors: challenge core beliefs, myths, and actions.  Status: new - Date: 12.29.21     Intervention(s)  Therapist will provide educational materials and handouts on core beliefs, cognitive distortions, and ACT, including exploring and identifying important values in patient's life.  Patient has reviewed and agreed to the above plan.        CHUY Ku, [facility]    December 29, 2021

## 2022-05-26 ENCOUNTER — HOSPITAL ENCOUNTER (OUTPATIENT)
Dept: MRI IMAGING | Facility: CLINIC | Age: 62
Discharge: HOME | End: 2022-05-26
Attending: STUDENT IN AN ORGANIZED HEALTH CARE EDUCATION/TRAINING PROGRAM
Payer: COMMERCIAL

## 2022-05-26 DIAGNOSIS — Z98.890 S/P CARPAL TUNNEL RELEASE: ICD-10-CM

## 2022-05-26 DIAGNOSIS — Z98.890 STATUS POST TRIGGER FINGER RELEASE: ICD-10-CM

## 2022-05-26 DIAGNOSIS — M79.641 PAIN OF RIGHT HAND: ICD-10-CM

## 2022-05-26 DIAGNOSIS — G56.01 CARPAL TUNNEL SYNDROME OF RIGHT WRIST: ICD-10-CM

## 2022-05-26 PROCEDURE — 73218 MRI UPPER EXTREMITY W/O DYE: CPT | Mod: RT

## 2022-05-26 PROCEDURE — 73218 MRI UPPER EXTREMITY W/O DYE: CPT | Mod: 26 | Performed by: RADIOLOGY

## 2022-05-26 PROCEDURE — 73221 MRI JOINT UPR EXTREM W/O DYE: CPT | Mod: RT

## 2022-05-26 PROCEDURE — 73221 MRI JOINT UPR EXTREM W/O DYE: CPT | Mod: 26 | Performed by: RADIOLOGY

## 2022-06-01 ENCOUNTER — THERAPY VISIT (OUTPATIENT)
Dept: OCCUPATIONAL THERAPY | Facility: CLINIC | Age: 62
End: 2022-06-01
Payer: COMMERCIAL

## 2022-06-01 DIAGNOSIS — M79.644 PAIN OF FINGER OF RIGHT HAND: Primary | ICD-10-CM

## 2022-06-01 DIAGNOSIS — M25.641 STIFFNESS OF FINGER JOINT, RIGHT: ICD-10-CM

## 2022-06-01 PROCEDURE — 97760 ORTHOTIC MGMT&TRAING 1ST ENC: CPT | Mod: GO | Performed by: OCCUPATIONAL THERAPIST

## 2022-06-01 PROCEDURE — 97110 THERAPEUTIC EXERCISES: CPT | Mod: GO | Performed by: OCCUPATIONAL THERAPIST

## 2022-06-01 NOTE — PROGRESS NOTES
SOAP note objective information for 6/1/2022.  Please refer to the daily flowsheet for treatment today, total treatment time and time spent performing 1:1 timed codes.       MRI 5/26/22 IMPRESSION:   1.Scapholunate volar and dorsal ligament disruption with associated   degenerative changes.   2. Marked arthritic changes involving the radiocarpal, ulnar carpal,   radial ulnar, and second through fourth metacarpophalangeal joints   which could represent degenerative changes, although this distribution   and appearance can be seen in the setting of inflammatory arthritides   such as rheumatoid arthritis. X-ray of the hands could aid in   evaluation of the joints.   3. Evidence for first carpometacarpal and triscaphe joint   osteoarthritis.   4. Underlying the external marker: Mild increased fluid of the third   flexor tenosynovium at the third MCP joint which is nonspecific and   could be physiologic versus subtle tenosynovitis.

## 2022-06-15 ENCOUNTER — THERAPY VISIT (OUTPATIENT)
Dept: OCCUPATIONAL THERAPY | Facility: CLINIC | Age: 62
End: 2022-06-15
Payer: COMMERCIAL

## 2022-06-15 DIAGNOSIS — M25.641 STIFFNESS OF FINGER JOINT, RIGHT: ICD-10-CM

## 2022-06-15 DIAGNOSIS — M79.644 PAIN OF FINGER OF RIGHT HAND: Primary | ICD-10-CM

## 2022-06-15 PROCEDURE — 97110 THERAPEUTIC EXERCISES: CPT | Mod: GO | Performed by: OCCUPATIONAL THERAPIST

## 2022-06-21 ENCOUNTER — VIRTUAL VISIT (OUTPATIENT)
Dept: PSYCHOLOGY | Facility: CLINIC | Age: 62
End: 2022-06-21
Payer: COMMERCIAL

## 2022-06-21 DIAGNOSIS — F32.1 MODERATE MAJOR DEPRESSION, SINGLE EPISODE (H): Primary | ICD-10-CM

## 2022-06-21 PROCEDURE — 90837 PSYTX W PT 60 MINUTES: CPT | Mod: 95 | Performed by: SOCIAL WORKER

## 2022-06-21 NOTE — PROGRESS NOTES
[facility]                                     Progress Note    Patient Name: Grant Shoemaker  Date: 6.21.22         Service Type: Individual      Session Start Time: 330pm  Session End Time: 428pm     Session Length: 58 min    Session #: 11    Attendees: Client attended alone    Service Modality:  Video Visit:      Provider verified identity through the following two step process.  Patient provided:  Patient photo    Telemedicine Visit: The patient's condition can be safely assessed and treated via synchronous audio and visual telemedicine encounter.      Reason for Telemedicine Visit: Patient has requested telehealth visit    Originating Site (Patient Location): Patient's home    Distant Site (Provider Location): Provider Remote Setting- Home Office    Consent:  The patient/guardian has verbally consented to: the potential risks and benefits of telemedicine (video visit) versus in person care; bill my insurance or make self-payment for services provided; and responsibility for payment of non-covered services.     Patient would like the video invitation sent by:  Send to e-mail at: [email or web address]    Mode of Communication:  Video Conference via Amwell    As the provider I attest to compliance with applicable laws and regulations related to telemedicine.    DATA  Extended Session (53+ minutes): PROLONGED SERVICE IN THE OUTPATIENT SETTING REQUIRING DIRECT (FACE-TO-FACE) PATIENT CONTACT BEYOND THE USUAL SERVICE:    - Longer session due to limited access to mental health appointments and necessity to address patient's distress / complexity  Interactive Complexity: No  Crisis: No   progress Since Last Session (Related to Symptoms / Goals / Homework):   Symptoms: similar to last session    Homework: Achieved / completed to satisfaction      Episode of Care Goals: Achieved / completed to satisfaction - ACTION (Actively working towards change); Intervened by reinforcing change plan /  "affirming steps taken     Current / Ongoing Stressors and Concerns: Pt reflected on concluding/accepting of where he is at/what has happened with the court of appeals decisions. 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 Pt reflected on bringing himself to the immediate stressors including financial stressors among frustration with applying for jobs, car repairs, home mortgage, etc. Without financial assistance with unemployment. He noticed \"there are a lot of smaller stressors that are adding up into a larger stressor. He reflected on several toward moves of making doctor's appointment/attending physical therapy in serving valuing his health. He is considering eye surgery with the insurance coverage he has. He described going on two dates with a person and they had mentioned \"I saw your mother today and she likes me\" though pt states that his mother had passed away. Encouraged pt to recognize that this may be a sign of not trustworthy and pt is practicing being nonjudgmental. This writer sent a worksheet on the DBT skill of radical acceptance and reviewed the worksheet on session and applied it to his situation in life.      Treatment Objective(s) Addressed in This Session:          Feeling and thought identification   Practicing solution based therapy  practicing emotive based therapy              Intervention:              ACT therapy, information on cognitive flexibility   Emotional identification, frustration, disappointment, angry, sadness      Assessments completed prior to visit:  The following assessments were completed by patient for this visit:  PHQ9: No flowsheet data found.      ASSESSMENT: Current Emotional / Mental Status (status of significant symptoms):   Risk status (Self / Other harm or suicidal ideation)   Patient denies current fears or concerns " "for personal safety.   Patient denies current or recent suicidal ideation or behaviors. This writer educated pt on how financial stress is a risk factor for suicide, pt denies having these thoughts or \"putting himself in harm.\"    Patient denies current or recent homicidal ideation or behaviors.   Patient denies current or recent self injurious behavior or ideation.   Patient denies other safety concerns.   Patient reports there has been no change in risk factors since their last session.     Patient reports there has been no change in protective factors since their last session.     Recommended that patient call 911 or go to the local ED should there be a change in any of these risk factors.     Appearance:   Appropriate    Eye Contact:   Good    Psychomotor Behavior: Normal    Attitude:   Cooperative    Orientation:   All   Speech    Rate / Production: Normal/ Responsive Normal     Volume:  Normal    Mood:    Anxious  Sad  Anhedonia   Affect:    Appropriate    Thought Content:  Clear    Thought Form:  Coherent  Logical    Insight:    Fair      Medication Review:   No changes to current psychiatric medication(s)     Medication Compliance:   Yes     Changes in Health Issues:   None reported     Chemical Use Review:   Substance Use: Chemical use reviewed, no active concerns identified      Tobacco Use: No current tobacco use.      Diagnosis:  1. Moderate major depression, single episode (H)    r/o obsessive compulsive personality disorder  Collateral Reports Completed:   Not Applicable    PLAN: (Patient Tasks / Therapist Tasks / Other): Pt will reflect on the worksheet on the DBT skill of radical acceptance and reviewed the worksheet on session and applied it to his situation in life.     Doe Kearney, CHUY, [facility], 6.21.22                                                  ______________________________________________________________________    Individual Treatment Plan    Patient's Name: Grant MEZA Sahara  Date " Of Birth: 1960    Date of Creation: 3.8.22; 6.21.22  Date Treatment Plan Last Reviewed/Revised: 3.8.22    DSM5 Diagnoses: 296.21 (F32.0) Major Depressive Disorder, Single Episode, Mild With anxious distress; r/o obsessive compulsive personality disorder  Psychosocial / Contextual Factors: loss of job, financial stress, court stress  PROMIS (reviewed every 90 days):     Referral / Collaboration:  Referral to another professional/service is not indicated at this time..    Anticipated number of session for this episode of care: 10-15  Anticipation frequency of session: every 3 weeks  Anticipated Duration of each session: 38-52 minutes  Treatment plan will be reviewed in 90 days or when goals have been changed.     MeasurableTreatment Goal(s) related to diagnosis / functional impairment(s)  Goal 1: Client will decrease his depressive symptoms in half from baseline phq9 score.    I will know I've met my goal when I have more energy and feel genuinely better about myself and respond in ways that are helpful to me in the long run.       Objective #A (Client Action)                Client will review and familiarize himself with feeling words. he will use feeling words to describe when his needs are met/not met.      Status: new - Date: 12.29.21; 6.21.22     Intervention(s)  Therapist will assign emotional recognition/identification including when expressing when her needs are not being met or are being met.     Objective #B    Client will Increased understanding of depressive feelings, including developing vocabulary to describe depression and identify cues and symptoms. Client will also identify areas of vulnerability that make his symptoms increase in frequency and intensity.  Status: new - Date: 10.13.21; 6.21.22     Intervention(s)  Therapist will provide educational materials on depression.     Objective #C  Client will Increase interest, engagement, and pleasure in doing things  Decrease frequency and intensity  of feeling down, depressed, hopeless.  Status: new - Date: 12.29.21; 6.21.22     Intervention(s)  Therapist will provide educational materials and handouts on behavioral activation.       Objective #D  Client will Identify negative self-talk and behaviors: challenge core beliefs, myths, and actions.  Status: new - Date: 12.29.21; 6.21.22     Intervention(s)  Therapist will provide educational materials and handouts on core beliefs, cognitive distortions, and ACT, including exploring and identifying important values in patient's life.  Patient has reviewed and agreed to the above plan.        CHUY Ku, [facility]    December 29, 2021; 6.21.22

## 2022-07-05 ENCOUNTER — VIRTUAL VISIT (OUTPATIENT)
Dept: PSYCHOLOGY | Facility: CLINIC | Age: 62
End: 2022-07-05
Payer: COMMERCIAL

## 2022-07-05 DIAGNOSIS — F32.1 MODERATE MAJOR DEPRESSION, SINGLE EPISODE (H): Primary | ICD-10-CM

## 2022-07-05 PROCEDURE — 90837 PSYTX W PT 60 MINUTES: CPT | Mod: 95 | Performed by: SOCIAL WORKER

## 2022-07-05 NOTE — PROGRESS NOTES
[facility]                                     Progress Note    Patient Name: Grant Shoemaker  Date: 7.5.22         Service Type: Individual      Session Start Time: 330pm  Session End Time: 428pm     Session Length: 58 min    Session #: 12    Attendees: Client attended alone    Service Modality:  Video Visit:      Provider verified identity through the following two step process.  Patient provided:  Patient photo    Telemedicine Visit: The patient's condition can be safely assessed and treated via synchronous audio and visual telemedicine encounter.      Reason for Telemedicine Visit: Patient has requested telehealth visit    Originating Site (Patient Location): Patient's home    Distant Site (Provider Location): Provider Remote Setting- Home Office    Consent:  The patient/guardian has verbally consented to: the potential risks and benefits of telemedicine (video visit) versus in person care; bill my insurance or make self-payment for services provided; and responsibility for payment of non-covered services.     Patient would like the video invitation sent by:  Send to e-mail at: [email or web address]    Mode of Communication:  Video Conference via Amwell    As the provider I attest to compliance with applicable laws and regulations related to telemedicine.    DATA  Extended Session (53+ minutes): PROLONGED SERVICE IN THE OUTPATIENT SETTING REQUIRING DIRECT (FACE-TO-FACE) PATIENT CONTACT BEYOND THE USUAL SERVICE:    - Longer session due to limited access to mental health appointments and necessity to address patient's distress / complexity  Interactive Complexity: No  Crisis: No   progress Since Last Session (Related to Symptoms / Goals / Homework):   Symptoms: similar to last session    Homework: Achieved / completed to satisfaction      Episode of Care Goals: Achieved / completed to satisfaction - ACTION (Actively working towards change); Intervened by reinforcing change plan /  "affirming steps taken     Current / Ongoing Stressors and Concerns: Pt reflected that he continues to interact with his girlfriend and she had mentioned to him growing a beard to which he is experimenting. He reflected on continuing to pursue a job opportunity, though states \"I feel like I've made no demonstrative progress and frustration because of the accumulation of job denial after job denial in his interview search. He notes submitting 3-4 applications and one interview for an  for the . He has been reflecting \"at what since does it make to retire though I know I would have not as great of social security benefit.\" This writer encouraged pt to focus on how a job would provide him with mental health benefits by adding structure/stability into day and would help to develop a routine in time go to bed, wake up, feel good during day and after work. Encouraged pt to focus on \"what have I learned from things that weren't so glamorous.\" Provided support and encouragement to allow pt to realize that he is reacting to a crisis rather than embracing opportunity; encouraged pt while in the interviews embracing opportunity rathering than reacting to a crisis. Pt reflected on volunteering to connect with representatives, he also reflected on his interactions with his dog whom he is close with and helps to motivate him to go on walks to continue to take care of his physical health. He also reflected on not having pleasure in doing activities that he previously enjoyed, baking, reading, watching a movie. He reflects on how it has been easier to give it a try through the \"toward\" move philosophy.        Treatment Objective(s) Addressed in This Session:          Feeling and thought identification   Practicing solution based therapy  practicing emotive based therapy              Intervention:              ACT therapy, information on cognitive flexibility   Emotional identification, frustration, " "disappointment, angry, sadness      Assessments completed prior to visit:  The following assessments were completed by patient for this visit:  PHQ9: No flowsheet data found.      ASSESSMENT: Current Emotional / Mental Status (status of significant symptoms):   Risk status (Self / Other harm or suicidal ideation)   Patient denies current fears or concerns for personal safety.   Patient denies current or recent suicidal ideation or behaviors. This writer educated pt on how financial stress is a risk factor for suicide, pt denies having these thoughts or \"putting himself in harm.\"    Patient denies current or recent homicidal ideation or behaviors.   Patient denies current or recent self injurious behavior or ideation.   Patient denies other safety concerns.   Patient reports there has been no change in risk factors since their last session.     Patient reports there has been no change in protective factors since their last session.     Recommended that patient call 911 or go to the local ED should there be a change in any of these risk factors.     Appearance:   Appropriate    Eye Contact:   Good    Psychomotor Behavior: Normal    Attitude:   Cooperative    Orientation:   All   Speech    Rate / Production: Normal/ Responsive Normal     Volume:  Normal    Mood:    Anxious  Sad  Anhedonia   Affect:    Appropriate    Thought Content:  Clear    Thought Form:  Coherent  Logical    Insight:    Fair      Medication Review:   No changes to current psychiatric medication(s)     Medication Compliance:   Yes     Changes in Health Issues:   None reported     Chemical Use Review:   Substance Use: Chemical use reviewed, no active concerns identified      Tobacco Use: No current tobacco use.      Diagnosis:  1. Moderate major depression, single episode (H)    r/o obsessive compulsive personality disorder  Collateral Reports Completed:   Not Applicable    PLAN: (Patient Tasks / Therapist Tasks / Other): encouraged pt while in the " interviews embracing opportunity rathering than reacting to a crisis.     Doe Kearney, MSW, [facility], 7.5.22                                                ______________________________________________________________________    Individual Treatment Plan    Patient's Name: Grant Shoemaker  YOB: 1960    Date of Creation: 3.8.22; 6.21.22  Date Treatment Plan Last Reviewed/Revised: 3.8.22    DSM5 Diagnoses: 296.21 (F32.0) Major Depressive Disorder, Single Episode, Mild With anxious distress; r/o obsessive compulsive personality disorder  Psychosocial / Contextual Factors: loss of job, financial stress, court stress  PROMIS (reviewed every 90 days):     Referral / Collaboration:  Referral to another professional/service is not indicated at this time..    Anticipated number of session for this episode of care: 10-15  Anticipation frequency of session: every 3 weeks  Anticipated Duration of each session: 38-52 minutes  Treatment plan will be reviewed in 90 days or when goals have been changed.     MeasurableTreatment Goal(s) related to diagnosis / functional impairment(s)  Goal 1: Client will decrease his depressive symptoms in half from baseline phq9 score.    I will know I've met my goal when I have more energy and feel genuinely better about myself and respond in ways that are helpful to me in the long run.       Objective #A (Client Action)                Client will review and familiarize himself with feeling words. he will use feeling words to describe when his needs are met/not met.      Status: new - Date: 12.29.21; 6.21.22     Intervention(s)  Therapist will assign emotional recognition/identification including when expressing when her needs are not being met or are being met.     Objective #B    Client will Increased understanding of depressive feelings, including developing vocabulary to describe depression and identify cues and symptoms. Client will also identify areas of vulnerability  that make his symptoms increase in frequency and intensity.  Status: new - Date: 10.13.21; 6.21.22     Intervention(s)  Therapist will provide educational materials on depression.     Objective #C  Client will Increase interest, engagement, and pleasure in doing things  Decrease frequency and intensity of feeling down, depressed, hopeless.  Status: new - Date: 12.29.21; 6.21.22     Intervention(s)  Therapist will provide educational materials and handouts on behavioral activation.       Objective #D  Client will Identify negative self-talk and behaviors: challenge core beliefs, myths, and actions.  Status: new - Date: 12.29.21; 6.21.22     Intervention(s)  Therapist will provide educational materials and handouts on core beliefs, cognitive distortions, and ACT, including exploring and identifying important values in patient's life.  Patient has reviewed and agreed to the above plan.        CHUY Ku, [facility]    December 29, 2021; 6.21.22

## 2022-07-06 ENCOUNTER — THERAPY VISIT (OUTPATIENT)
Dept: OCCUPATIONAL THERAPY | Facility: CLINIC | Age: 62
End: 2022-07-06
Payer: COMMERCIAL

## 2022-07-06 DIAGNOSIS — M79.644 PAIN OF FINGER OF RIGHT HAND: Primary | ICD-10-CM

## 2022-07-06 DIAGNOSIS — M25.641 STIFFNESS OF FINGER JOINT, RIGHT: ICD-10-CM

## 2022-07-06 PROCEDURE — 97535 SELF CARE MNGMENT TRAINING: CPT | Mod: GO | Performed by: OCCUPATIONAL THERAPIST

## 2022-07-06 PROCEDURE — 97110 THERAPEUTIC EXERCISES: CPT | Mod: GO | Performed by: OCCUPATIONAL THERAPIST

## 2022-07-06 PROCEDURE — 97018 PARAFFIN BATH THERAPY: CPT | Mod: GO | Performed by: OCCUPATIONAL THERAPIST

## 2022-07-06 NOTE — PROGRESS NOTES
Hand Therapy Initial Evaluation    Current Date:  5/18/2022    Diagnosis:   Pain of right hand   S/P carpal tunnel release   Status post trigger finger release   Carpal tunnel syndrome of right wrist     DOI: 5/6/22 MD order, noticing since about one month ago   DOS: NA (Prior surgeries, few years prior)   Procedure:  NA    Precautions: None noted     Subjective:  Grant Shoemaker is a 61 year old male.    Patient reports symptoms of the right IF and MF which occurred due to unknown cause. Since onset symptoms are Gradually getting worse.      Answers for HPI/ROS submitted by the patient on 5/18/2022  Reason for Visit:: Hand pain - centered around flexor tendons  How problem occurred:: It gradually developed a little over a month ago.  Number scale: 2/10  General health as reported by patient: good  Please check all that apply to your current or past medical history: high blood pressure, overweight, sleep disorder/apnea, thyroid problems  Medical allergies: none  Surgeries: orthopedic surgery, other  Other Surgery Detail: back (cervical cervical discectomy and fusion, and laminectomy without fusion)  Medications you are currently taking: high blood pressure medication, thyroid medication, other  Other Meds Detail: cholesterol, vitamin D, calcium supplement  Occupation:: unemployed  What are your primary job tasks: computer work, driving, prolonged sitting      Occupational Profile Information:  Right hand dominant  Prior functional level:  no limitations  Patient reports symptoms of pain, stiffness/loss of motion, weakness/loss of strength, edema, numbness and tingling   Special tests:  None noted.    Previous treatment: For prior trigger releases.   Barriers include:none  Mobility: No difficulty  Transportation: TBD  Currently not working   Leisure activities/hobbies: TBD   Other: R trigger finger prior releases of thumb, IF, LF, RF - L trigger releases of LF, RF, SF. Years from 1990 -   CTR R >5 years ago. Has  "tendonitis in R shoulder and arm. Has been wearing wrist brace at night - OTC.     Functional Outcome Measure:   Upper Extremity Functional Index Score:  SCORE:   Column Totals: /80: (P) 71   (A lower score indicates greater disability.)      Objective:  Pain Level (Scale 0-10):   5/18/2022   At Rest 1   With Use 3.5     Pain Description:  Date 5/18/2022   Location Palm, radial    Pain Quality Aching, Burning, Dull, Nagging, Tender and stiffness in am    Frequency constant     Pain is worst  daytime   Exacerbated by  Moving    Relieved by otc medications and rest   Progression Worse      Posture  Normal    Edema (Circumference measured in cm)   5/18/2022 5/18/2022 05/18/22 05/18/22      R L R L    IF IF LF  LF    P1 7.4 6.8 7.4 7   PIP 7.0 6.4 7.4 6.8   P2  6.2 5.8 6.3 6.6     Mild edema noted in R digits 4-5     Sensation  Decreased Median Nerve distribution per pt report - mild burning at times. Dulled in in R digits 1 and 2.     ROM  Pain Report: - none  + mild    ++ moderate    +++ severe   Wrist 5/18/2022 5/18/2022   AROM (PROM) L R   Extension 60 60   Flexion 45 30   RD 30 15 (discomfort in CT, pinching)   UD 30 30     Special Tests  Pain Report:  - none    + mild    ++ moderate    +++ severe    5/18/2022   Median Nerve Compression at Pronator    Carpal Compression Test--Durkan Test (30 sec) -   Live Test for Lumbrical Incursion  (fist x 30 secs) + in digits 2-3    Tinels at Carpal Tunnel -   Phalens + proximal t to CT pinch      B thumb AROM WFL  B 4th and 5th digit AROM WFL     ROM  Hand 5/18/2022 5/18/2022   AROM(PROM) L R   Index MP 0/85 0/75   PIP 0/80 0/65   DIP 0/65 -5/55   JAMES     Long MP 0/90 0/80   PIP 0/75 0/65   DIP 0/73 -10/50   JAMES         Neural Tension Testing  MNT: Median Neurodynamic Test (based on DS Constantine's ULNT)   5/18/2022   0-5 Scale 5 S1 \"stretching\"   Position:   0/5: Arm across abdomen in coronal plane  1/5: Depress shoulder, ER to neutral ABD shoulder to 45 degrees  2/5: ER " shoulder to end range, keep elbow at 90 degrees  3/5: Extend elbow to 0 degrees  4/5: Fully supinate forearm  5/5: Extend wrist, fingers and thumb  Notes:    (+) indicates beyond grade level but less than assisted to next level    (-) indicates over assisted to level    S1  onset/change of patient's symptoms    S2 definite stop point based on patient's discomfort level    Strength   (Measured in pounds)  Pain Report: - none  + mild    ++ moderate    +++ severe    5/18/2022 5/18/2022   Trials L R   1  2  3 30 30 +    Average       Lat Pinch 5/18/2022 5/18/2022   Trials L R   1  2  3 18 19   Average       3 Pt Pinch 5/18/2022 5/18/2022   Trials L R   1  2  3 19 8.5 +   Average       FDP is weak compared to FDS in digits 2-3     Assessment:  Patient presents with symptoms consistent with diagnosis noted above, with conservative intervention.     Patient's limitations or Problem List includes:  Pain, Decreased ROM/motion, Increased edema, Weakness, Sensory disturbance, Hypomobility, Decreased , Decreased pinch and Decreased coordination of the right hand, index finger and long finger which interferes with the patient's ability to perform Work Tasks, Sleep Patterns, Recreational Activities and Household Chores as compared to previous level of function.    Rehab Potential:  Good - Return to full activity, some limitations    Patient will benefit from skilled Occupational Therapy to increase ROM, flexibility, motion, overall strength,  strength, pinch strength, coordination, dexterity and sensation and decrease pain and edema to return to previous activity level and resume normal daily tasks and to reach their rehab potential.    Barriers to Learning:  No barrier    Communication Issues:  Patient appears to be able to clearly communicate and understand verbal and written communication and follow directions correctly.    Chart Review: Chart Review and Simple history review with patient    Identified Performance  Deficits: health management and maintenance, home establishment and management, meal preparation and cleanup, shopping, sleep and leisure activities    Assessment of Occupational Performance:  1-3 Performance Deficits    Clinical Decision Making (Complexity): Low complexity    Treatment Explanation:  The following has been discussed with the patient:  RX ordered/plan of care  Anticipated outcomes  Possible risks and side effects    Plan:  Frequency:  1 X week, once daily  Duration:  for 6 weeks    Treatment Plan:    Modalities:    US, Iontophoresis, Fluidotherapy, Paraffin and E-Stim   Therapeutic Exercise:    AROM, AAROM, PROM, Tendon Gliding, Blocking, Reverse Blocking, Extensor Tracking, Isotonics, Isometrics and Stabilization  Neuromuscular re-ed:   Nerve Gliding, Coordination/Dexterity, Sensory re-education, Posture, Kinesiotaping, Strain Counter Strain, Isometrics and Stabilization  Manual Techniques:   Coordination/Dexterity, Joint mobilization, Scar mobilization, Friction massage, Myofascial release and Manual edema mobilization  Orthotic Fabrication:    Static, Static progressive and Dynamic  Self Care:    Self Care Tasks, Ergonomic Considerations, Community Transportation and Work Tasks    Discharge Plan:  Achieve all LTG.  Independent in home treatment program.  Reach maximal therapeutic benefit.    Home Exercise Program:  PTRX Report  The following values have been sent to [facility].  Finger Active Range of Motion Tendon Glides Fist Series  EMR Notes  HEP - Sets  Reps 5-10  Sessions per day 2-3  Notes  Finger Active Range of Motion FDS Flexor Tendon Gliding  EMR Notes  HEP - Sets  Reps 5-10  Sessions per day 2-3  Notes Just index and middle. Most imprortant.  Finger Active Range of Motion DIP Joint Blocking  EMR Notes  HEP - Sets  Reps 5-10  Sessions per day 2-3  Notes Just index and middle. Most important.  Forearm Passive Range of Motion Flexor Stretch  EMR Notes  HEP - Sets  Reps 3-4  Sessions per day  2-3  Notes 15 sec hold  Forearm Passive Range of Motion Extensor Stretch  EMR Notes  HEP - Sets  Reps 3-4  Sessions per day 2-3  Notes 15 sec hold  Icing  EMR Notes  HEP - Sets  Reps  Sessions per day  Notes  Education Sheet General  EMR Notes  HEP - Sets  Reps  Sessions per day 2  Notes Gentle massage of fingers and hand toward body. 2-5 min sessions. 2x per day. Gentle wrapping of fingers using coban at night.  Everetts Massage to Thumb  EMR Notes  HEP - Sets  Reps  Sessions per day  Notes As needed.  Ball Massage to Flexors  EMR Notes  HEP - Sets  Reps  Sessions per day 2  Notes 5 minutes each. 2x per day. Can use double ball on table. PRN.  Ball Massage to Extensors  EMR Notes  HEP - Sets  Reps  Sessions per day 2  Notes 5 minutes each. 2x per day. Can use double ball on table. PRN.      Next Visit:  Assess leisure   Assess HEP   Massage   A/AA/PROM

## 2022-07-19 ENCOUNTER — VIRTUAL VISIT (OUTPATIENT)
Dept: PSYCHOLOGY | Facility: CLINIC | Age: 62
End: 2022-07-19
Payer: COMMERCIAL

## 2022-07-19 DIAGNOSIS — F32.1 MODERATE MAJOR DEPRESSION, SINGLE EPISODE (H): Primary | ICD-10-CM

## 2022-07-19 PROCEDURE — 90834 PSYTX W PT 45 MINUTES: CPT | Mod: 95 | Performed by: SOCIAL WORKER

## 2022-07-19 NOTE — PROGRESS NOTES
[facility]                                     Progress Note    Patient Name: Grant Shoemaker  Date: 7.19.22         Service Type: Individual      Session Start Time: 1002am  Session End Time: 1051am     Session Length: 51 min    Session #: 13    Attendees: Client attended alone    Service Modality:  Video Visit:      Provider verified identity through the following two step process.  Patient provided:  Patient photo    Telemedicine Visit: The patient's condition can be safely assessed and treated via synchronous audio and visual telemedicine encounter.      Reason for Telemedicine Visit: Patient has requested telehealth visit    Originating Site (Patient Location): Patient's home    Distant Site (Provider Location): Provider Remote Setting- Home Office    Consent:  The patient/guardian has verbally consented to: the potential risks and benefits of telemedicine (video visit) versus in person care; bill my insurance or make self-payment for services provided; and responsibility for payment of non-covered services.     Patient would like the video invitation sent by:  Send to e-mail at: [email or web address]    Mode of Communication:  Video Conference via [employer]    As the provider I attest to compliance with applicable laws and regulations related to telemedicine.    DATA  Extended Session (53+ minutes): PROLONGED SERVICE IN THE OUTPATIENT SETTING REQUIRING DIRECT (FACE-TO-FACE) PATIENT CONTACT BEYOND THE USUAL SERVICE:    - Longer session due to limited access to mental health appointments and necessity to address patient's distress / complexity  Interactive Complexity: No  Crisis: No   progress Since Last Session (Related to Symptoms / Goals / Homework):   Symptoms: similar to last session    Homework: Achieved / completed to satisfaction      Episode of Care Goals: Achieved / completed to satisfaction - ACTION (Actively working towards change); Intervened by reinforcing change plan /  "affirming steps taken     Current / Ongoing Stressors and Concerns: Pt reflected on his experience of leaning toward engaging with the flexibility of practicing self care without resolving his unemployment crisis. Processed his active steps of watching two movies and selecting a few books which aligns with \"this is what I would do if my crisis was resolved. I am practicing flexibility because I am willing to do this even though my mind tells me I can only do this once my crisis is resolved.\" Pt reflected on the emotional experience of \"people who I need to rely on are not following up, following procedures and taking ownership for their responsibilities.\" We discussed for the remainder of the session, of pt realizing that \"the problem is not I am a horrible person, it is really more of a systematic pattern with the world, employment, health care, the world.\" Processed how pt has learned a perspective in therapy in how to approach decision making by having his values determine his actions and to celebrate himself when he has the courage to accept discomfort/unwanted intrusive experience.\" Discussed how pt would like to cancel appointments moving forward and end this session of treatment. Explored with pt what would have to happen/how he would know that he has reached his limit and needs to reach out to schedule another therapy appointment. Processed how one notion could be \"when I am having an experience that is going counter to my belief system and value system and is making it sad, disappointing, and devastating to what I had thought and believed about the world\" as well as \"when the stressors are causing diproprionate levels of stress of toleration.\"     Treatment Objective(s) Addressed in This Session:          Feeling and thought identification   Practicing solution based therapy  practicing emotive based therapy              Intervention:              ACT therapy, information on cognitive " "flexibility   Emotional identification, frustration, disappointment, angry, sadness      Assessments completed prior to visit:  The following assessments were completed by patient for this visit:  PHQ9: No flowsheet data found.      ASSESSMENT: Current Emotional / Mental Status (status of significant symptoms):   Risk status (Self / Other harm or suicidal ideation)   Patient denies current fears or concerns for personal safety.   Patient denies current or recent suicidal ideation or behaviors. This writer educated pt on how financial stress is a risk factor for suicide, pt denies having these thoughts or \"putting himself in harm.\"    Patient denies current or recent homicidal ideation or behaviors.   Patient denies current or recent self injurious behavior or ideation.   Patient denies other safety concerns.   Patient reports there has been no change in risk factors since their last session.     Patient reports there has been no change in protective factors since their last session.     Recommended that patient call 911 or go to the local ED should there be a change in any of these risk factors.     Appearance:   Appropriate    Eye Contact:   Good    Psychomotor Behavior: Normal    Attitude:   Cooperative    Orientation:   All   Speech    Rate / Production: Normal/ Responsive Normal     Volume:  Normal    Mood:    Anxious  Sad  Anhedonia   Affect:    Appropriate    Thought Content:  Clear    Thought Form:  Coherent  Logical    Insight:    Fair      Medication Review:   No changes to current psychiatric medication(s)     Medication Compliance:   Yes     Changes in Health Issues:   None reported     Chemical Use Review:   Substance Use: Chemical use reviewed, no active concerns identified      Tobacco Use: No current tobacco use.      Diagnosis:  1. Moderate major depression, single episode (H)    r/o obsessive compulsive personality disorder  Collateral Reports Completed:   Not Applicable    PLAN: (Patient Tasks / " "Therapist Tasks / Other): Pt will observe \"when I am having an experience that is going counter to my belief system and value system and is making it sad, disappointing, and devastating to what I had thought and believed about the world\" and will reach out to social support, including this writer to help him process his emotion to make meaning about his life and experience.    Doe Kearney, MSW, [facility], 7.19.22                                                ______________________________________________________________________    Individual Treatment Plan    Patient's Name: Grant Shoemaker  YOB: 1960    Date of Creation: 3.8.22; 6.21.22  Date Treatment Plan Last Reviewed/Revised: 3.8.22    DSM5 Diagnoses: 296.21 (F32.0) Major Depressive Disorder, Single Episode, Mild With anxious distress; r/o obsessive compulsive personality disorder  Psychosocial / Contextual Factors: loss of job, financial stress, court stress  PROMIS (reviewed every 90 days):     Referral / Collaboration:  Referral to another professional/service is not indicated at this time..    Anticipated number of session for this episode of care: 10-15  Anticipation frequency of session: every 3 weeks  Anticipated Duration of each session: 38-52 minutes  Treatment plan will be reviewed in 90 days or when goals have been changed.     MeasurableTreatment Goal(s) related to diagnosis / functional impairment(s)  Goal 1: Client will decrease his depressive symptoms in half from baseline phq9 score.    I will know I've met my goal when I have more energy and feel genuinely better about myself and respond in ways that are helpful to me in the long run.       Objective #A (Client Action)                Client will review and familiarize himself with feeling words. he will use feeling words to describe when his needs are met/not met.      Status: new - Date: 12.29.21; 6.21.22     Intervention(s)  Therapist will assign emotional " recognition/identification including when expressing when her needs are not being met or are being met.     Objective #B    Client will Increased understanding of depressive feelings, including developing vocabulary to describe depression and identify cues and symptoms. Client will also identify areas of vulnerability that make his symptoms increase in frequency and intensity.  Status: new - Date: 10.13.21; 6.21.22     Intervention(s)  Therapist will provide educational materials on depression.     Objective #C  Client will Increase interest, engagement, and pleasure in doing things  Decrease frequency and intensity of feeling down, depressed, hopeless.  Status: new - Date: 12.29.21; 6.21.22     Intervention(s)  Therapist will provide educational materials and handouts on behavioral activation.       Objective #D  Client will Identify negative self-talk and behaviors: challenge core beliefs, myths, and actions.  Status: new - Date: 12.29.21; 6.21.22     Intervention(s)  Therapist will provide educational materials and handouts on core beliefs, cognitive distortions, and ACT, including exploring and identifying important values in patient's life.  Patient has reviewed and agreed to the above plan.        CHUY Ku, [facility]    December 29, 2021; 6.21.22

## 2022-07-27 ENCOUNTER — THERAPY VISIT (OUTPATIENT)
Dept: OCCUPATIONAL THERAPY | Facility: CLINIC | Age: 62
End: 2022-07-27
Payer: COMMERCIAL

## 2022-07-27 DIAGNOSIS — M25.641 STIFFNESS OF FINGER JOINT, RIGHT: ICD-10-CM

## 2022-07-27 DIAGNOSIS — M79.644 PAIN OF FINGER OF RIGHT HAND: Primary | ICD-10-CM

## 2022-07-27 PROCEDURE — 97035 APP MDLTY 1+ULTRASOUND EA 15: CPT | Mod: GO | Performed by: OCCUPATIONAL THERAPIST

## 2022-07-27 PROCEDURE — 97110 THERAPEUTIC EXERCISES: CPT | Mod: GO | Performed by: OCCUPATIONAL THERAPIST

## 2022-08-06 ENCOUNTER — HEALTH MAINTENANCE LETTER (OUTPATIENT)
Age: 62
End: 2022-08-06

## 2022-08-10 ENCOUNTER — THERAPY VISIT (OUTPATIENT)
Dept: OCCUPATIONAL THERAPY | Facility: CLINIC | Age: 62
End: 2022-08-10
Payer: COMMERCIAL

## 2022-08-10 DIAGNOSIS — M79.644 PAIN OF FINGER OF RIGHT HAND: Primary | ICD-10-CM

## 2022-08-10 DIAGNOSIS — M25.641 STIFFNESS OF FINGER JOINT, RIGHT: ICD-10-CM

## 2022-08-10 PROCEDURE — 97110 THERAPEUTIC EXERCISES: CPT | Mod: GO | Performed by: OCCUPATIONAL THERAPIST

## 2022-08-10 NOTE — PROGRESS NOTES
Hand Therapy Progress Note    Current Date:  8/10/2022    Diagnosis:   Pain of right hand   S/P carpal tunnel release   Status post trigger finger release   Carpal tunnel syndrome of right wrist     DOI: 5/6/22 MD order, noticing since about one month ago   DOS: NA (Prior surgeries, few years prior)   Procedure:  NA    Reporting period is 5/18/22 to 8/10/2022    Subjective:   Subjective changes noted by patient:  Pt feels pain has been less frequent and less intense. Still notes pain in palmar hand at digits 2-3 flexors. Favors left hand still. Digit ROM improved from last session - pt has been baking.   Functional changes noted by patient:  Improvement in Self Care Tasks (dressing), Work Tasks and Household Chores  Patient has noted adverse reaction to:  None    Functional Outcome Measure:  TBD    Objective:    Please refer to the daily flowsheet for treatment provided today.     Pain Level (Scale 0-10):   5/18/2022 08/10/22     At Rest 1 0   With Use 3.5 3     Pain Description:  Date 5/18/2022 08/10/22   Location Palm, radial  2-3 flexors, palm     Pain Quality Aching, Burning, Dull, Nagging, Tender and stiffness in am  Aching, Burning, Dull, Nagging and stiffness (morning with more stiffness)    Frequency constant   Intermittent    Pain is worst  daytime daytime   Exacerbated by  Moving  Gripping    Relieved by otc medications and rest Baking, meds less often - more for knee, HEP   Progression Worse  Better      Posture  Normal    Edema (Circumference measured in cm)   5/18/2022 5/18/2022 05/18/22   05/18/22   08/10/22   08/10/22      R L R L R L    IF IF LF  LF  IF LF   P1 7.4 6.8 7.4 7 7.3 7.3   PIP 7.0 6.4 7.4 6.8     P2  6.2 5.8 6.3 6.6       SOC:  Mild edema noted in R digits 4-5     Sensation  Decreased Median Nerve distribution per pt report - mild burning at times. Dulled in in R digits 1 and 2.     ROM  Pain Report: - none  + mild    ++ moderate    +++ severe   Wrist 5/18/2022 5/18/2022 08/10/22     AROM  "(PROM) L R R   Extension 60 60 60   Flexion 45 30 40   RD 30 15 (discomfort in CT, pinching) 15   UD 30 30 45     Special Tests  Pain Report:  - none    + mild    ++ moderate    +++ severe    5/18/2022   Median Nerve Compression at Pronator    Carpal Compression Test--Durkan Test (30 sec) -   Live Test for Lumbrical Incursion  (fist x 30 secs) + in digits 2-3    Tinels at Carpal Tunnel -   Phalens + proximal t to CT pinch      SOC:  B thumb AROM WFL  B 4th and 5th digit AROM WFL     ROM  Hand 5/18/2022 5/18/2022 08/10/22     AROM(PROM) L R R   Index MP 0/85 0/75 75   PIP 0/80 0/65 65   DIP 0/65 -5/55 -5/63   JAMES      Long MP 0/90 0/80 80   PIP 0/75 0/65 80   DIP 0/73 -10/50 -5/80   JAMES          Neural Tension Testing  MNT: Median Neurodynamic Test (based on ESTEVAN Fitch's ULNT)   5/18/2022   0-5 Scale 5 S1 \"stretching\"   Position:   0/5: Arm across abdomen in coronal plane  1/5: Depress shoulder, ER to neutral ABD shoulder to 45 degrees  2/5: ER shoulder to end range, keep elbow at 90 degrees  3/5: Extend elbow to 0 degrees  4/5: Fully supinate forearm  5/5: Extend wrist, fingers and thumb  Notes:    (+) indicates beyond grade level but less than custodial to next level    (-) indicates over custodial to level    S1  onset/change of patient's symptoms    S2 definite stop point based on patient's discomfort level    Strength   (Measured in pounds)  Pain Report: - none  + mild    ++ moderate    +++ severe    5/18/2022 5/18/2022   Trials L R   1  2  3 30 30 +    Average       Lat Pinch 5/18/2022 5/18/2022   Trials L R   1  2  3 18 19   Average       3 Pt Pinch 5/18/2022 5/18/2022   Trials L R   1  2  3 19 8.5 +   Average       SOC:  FDP is weak compared to FDS in digits 2-3         Assessment:  Response to therapy has been improvement to:  ROM   Pain  Work Performance  Self Care Skills  Paresthesias  Sensitivity    Overall Assessment:  Patient is becoming more independent in home exercise program  Patient would benefit " from continued therapy to achieve rehab potential  STG/LTG:  STGoals have been reviewed and progress or achievement has occurred;  see goal sheet for details and updates.    Plan:  Frequency/Duration:  Recommend continuing to see patient 1 X week, once daily  for 6 more weeks  Appropriateness of Rx I have re-evaluated this patient and find that the nature, scope, duration and intensity of the therapy is appropriate for the medical condition of the patient.  Recommendations for Continued Therapy    Treatment Plan:  Modalities:    US, Iontophoresis, Fluidotherapy, Paraffin and E-Stim   Therapeutic Exercise:    AROM, AAROM, PROM, Tendon Gliding, Blocking, Reverse Blocking, Extensor Tracking, Isotonics, Isometrics and Stabilization  Neuromuscular re-ed:   Nerve Gliding, Coordination/Dexterity, Sensory re-education, Posture, Kinesiotaping, Strain Counter Strain, Isometrics and Stabilization  Manual Techniques:   Coordination/Dexterity, Joint mobilization, Scar mobilization, Friction massage, Myofascial release and Manual edema mobilization  Orthotic Fabrication:    Static, Static progressive and Dynamic  Self Care:    Self Care Tasks, Ergonomic Considerations, Community Transportation and Work Tasks    Home Exercise Program:  PTRX Report  The following values have been sent to [facility].  Education Sheet General  EMR Notes  HEP - Sets  Reps  Sessions per day 2  Notes Gentle massage of fingers and hand toward body. 2-5 min sessions. 2x per day. Gentle wrapping of fingers using coban at night.  Finger Active Range of Motion Tendon Glides Fist Series  EMR Notes  HEP - Sets  Reps 5-10  Sessions per day 2-3  Notes  Wrist Active Range of Motion Circumduction  EMR Notes  HEP - Sets  Reps 5-10  Sessions per day 2-3  Notes  Finger Active Range of Motion PIP Joint Blocking  EMR Notes  HEP - Sets  Reps 5-10  Sessions per day 2-3  Notes All digits, individually  Finger Active Range of Motion DIP Joint Blocking  EMR Notes  HEP  - Sets  Reps 5-10  Sessions per day 2-3  Notes All digits, individually  Finger Passive Range of Motion Composite Flexion  EMR Notes  HEP - Sets  Reps 5-10  Sessions per day 2-3  Notes Pain free range.  Hand Strengthening Gripping  EMR Notes  HEP - Sets  Reps 5-10  Sessions per day 2-3  Notes Stable ball, hold for a few seconds  Wrist Isometric Strengthening for ECRL DTM/Dart Throwers Motion  EMR Notes  HEP - Sets  Reps 5-10  Sessions per day 2-3  Notes  Wrist Isometric Strengthening for FCU DTM/Dart Throwers Motion  EMR Notes  HEP - Sets  Reps 5-10  Sessions per day 2-3  Notes  Icing  EMR Notes  HEP - Sets  Reps  Sessions per day  Notes  Wrist Active Range of Motion Extension and Flexion Combined  EMR Notes  HEP - Sets  Reps 5-10  Sessions per day 2-3  Notes Pain free range. Out of brace. As needed.  Active Range of Motion Combined Radial and Ulnar Deviation  EMR Notes  HEP - Sets  Reps 5-10  Sessions per day 2-3  Notes As needed.  Hook Fist and Pull Back with a Marker for Intrinsic Hand Muscle Stretching  EMR Notes  HEP - Sets  Reps 5-10  Sessions per day 2-3  Notes As needed.  Wrist Stabilization Door Frame Pull Backs  EMR Notes  HEP - Sets  Reps 5-25  Sessions per day 3  Notes Can hold for 3-5 seconds at a time. As needed.  Wrist Stabilization Wall Push Ups on Fists  EMR Notes  HEP - Sets  Reps  Sessions per day  Notes As needed.  Intrinsic Finger Active Range of Motion Ab and Adduction  EMR Notes  HEP - Sets  Reps 5-10  Sessions per day 2-3  Notes As needed.  Ball Massage to Extensors  EMR Notes  HEP - Sets  Reps  Sessions per day 2  Notes 5 minutes each. 2x per day. Can use double ball on table. PRN.  Ball Massage to Flexors  EMR Notes  HEP - Sets  Reps  Sessions per day 2  Notes 5 minutes each. 2x per day. Can use double ball on table. PRN.  [place] Massage to Thumb  EMR Notes  HEP - Sets  Reps  Sessions per day  Notes As needed. PRN.      Next Visit:  UEFI  Assess  and pinch   See flowsheet

## 2022-08-10 NOTE — PROGRESS NOTES
[facility]    OUTPATIENT Occupational Therapy ORTHOPEDIC EVALUATION  PLAN OF TREATMENT FOR OUTPATIENT REHABILITATION  (COMPLETE FOR INITIAL CLAIMS ONLY)  Patient's Last Name, First Name, M.I.  YOB: 1960  Grant Shoemaker    Provider s Name:  [facility]   Medical Record No.  [id number]   Start of Care Date:  05/18/22   Onset Date:   05/06/22 (MD order)   Type:     __PT   __x_OT Medical Diagnosis:    Encounter Diagnoses   Name Primary?    Pain of finger of right hand Yes    Stiffness of finger joint, right         Treatment Diagnosis:  Pain of right hand, S/P carpal tunnel release, Status post trigger finger release, Carpal tunnel syndrome of right wrist        Goals:     08/10/22 0500   Goal #1   Goal #1 household chores   Previous Performance Level Independent   Current Functional Task    Current Performance Level 3/10 pain   STG [employer] Open a tight or new jar   STG Target Perform Level 1/0 pain   Due Date 08/13/22   If goal not met, Why? Pt limited by pain and stiffness, although improved, continue goal.   LTG Target Task/Performance Pain free household chores   Due Date 08/16/22   If goal not met, Why? Goal dates extended d/t pain and stiffness.       Therapy Frequency:  1x per week  Predicted Duration of Therapy Intervention:  6 weeks    RODOLFO ALATORRE OT                 I CERTIFY THE NEED FOR THESE SERVICES FURNISHED UNDER        THIS PLAN OF TREATMENT AND WHILE UNDER MY CARE     (Physician attestation of this document indicates review and certification of the therapy plan).                     Certification Date From:  05/19/22   Certification Date To:  08/16/22    Referring Provider:  No ref. provider found    Initial Assessment        See Epic Evaluation SOC Date: 05/18/22

## 2022-08-10 NOTE — PROGRESS NOTES
[facility]    OUTPATIENT Occupational Therapy ORTHOPEDIC EVALUATION  PLAN OF TREATMENT FOR OUTPATIENT REHABILITATION  (COMPLETE FOR INITIAL CLAIMS ONLY)  Patient's Last Name, First Name, M.I.  YOB: 1960  Grant Shoemaker    Provider s Name:  [facility]   Medical Record No.  [id number]   Start of Care Date:  05/18/22   Onset Date:   05/06/22 (MD order)   Type:     ___PT   __x_OT Medical Diagnosis:    Encounter Diagnoses   Name Primary?    Pain of finger of right hand Yes    Stiffness of finger joint, right         Treatment Diagnosis:  Pain of right hand, S/P carpal tunnel release, Status post trigger finger release, Carpal tunnel syndrome of right wrist        Goals:     08/10/22 0500   Goal #1   Goal #1 household chores   Previous Performance Level Independent   Current Functional Task    Current Performance Level 3/10 pain   STG [employer] Open a tight or new jar   STG Target Perform Level 1/0 pain   Due Date 08/13/22   If goal not met, Why? Pt limited by pain and stiffness, although improved, continue goal.   LTG Target Task/Performance Pain free household chores   Due Date 08/16/22   If goal not met, Why? Goal dates extended d/t pain and stiffness.       Therapy Frequency:  1x per week  Predicted Duration of Therapy Intervention:  6 weeks    RODOLFO ALATORRE OT                 I CERTIFY THE NEED FOR THESE SERVICES FURNISHED UNDER        THIS PLAN OF TREATMENT AND WHILE UNDER MY CARE     (Physician attestation of this document indicates review and certification of the therapy plan).                     Certification Date From:  05/19/22   Certification Date To:  08/16/22    Referring Provider:  No ref. provider found    Initial Assessment        See Epic Evaluation SOC Date: 05/18/22

## 2022-08-23 DIAGNOSIS — E03.9 ACQUIRED HYPOTHYROIDISM: ICD-10-CM

## 2022-08-23 DIAGNOSIS — I10 BENIGN ESSENTIAL HYPERTENSION: ICD-10-CM

## 2022-08-25 RX ORDER — LISINOPRIL 10 MG/1
TABLET ORAL
Qty: 90 TABLET | Refills: 0 | Status: SHIPPED | OUTPATIENT
Start: 2022-08-25 | End: 2022-11-22

## 2022-08-25 RX ORDER — TRIAMTERENE/HYDROCHLOROTHIAZID 37.5-25 MG
TABLET ORAL
Qty: 90 TABLET | Refills: 0 | Status: SHIPPED | OUTPATIENT
Start: 2022-08-25 | End: 2022-11-22

## 2022-08-25 RX ORDER — LEVOTHYROXINE SODIUM 150 UG/1
TABLET ORAL
Qty: 90 TABLET | Refills: 0 | Status: SHIPPED | OUTPATIENT
Start: 2022-08-25 | End: 2022-11-22

## 2022-08-25 NOTE — TELEPHONE ENCOUNTER
Levothyroxine 150 mcg tab    Lisinopril 10 mg tab    Triamterene-hydrochlorothiazide 37.5-25 mg tab    Medication is being filled for 1 time refill only due to:  Patient needs labs all. Patient needs to be seen because it has been more than one year since last visit. Future appointment needed. Appointment scheduled.     Appointments in Next Year    Sep 06, 2022 12:30 PM  Hand Treatment with Beckie Dangelo OT  [facility] ([facility] ) [phone]   Sep 09, 2022  2:00 PM  (Arrive by 1:40 PM)  Provider Visit with Sheridan Mistry MD  [facility] ([facility] ) [phone]   Sep 23, 2022  1:00 PM  (Arrive by 12:40 PM)  PRE OPERATIVE PHYSICAL with Sheridan Mistry MD  [facility] ([facility] ) [phone]   Sep 29, 2022 12:30 PM  Hand Treatment with Beckie Dangelo OT  [facility] ([facility] ) [phone]   Oct 18, 2022 12:30 PM  Hand Treatment with Beckie Dangelo OT  [facility] ([facility] ) [phone]                 Implemented All Universal Safety Interventions:  [place] to call system. Call bell, personal items and telephone within reach. Instruct patient to call for assistance. Room bathroom lighting operational. Non-slip footwear when patient is off stretcher. Physically safe environment: no spills, clutter or unnecessary equipment. Stretcher in lowest position, wheels locked, appropriate side rails in place.

## 2022-09-06 ENCOUNTER — THERAPY VISIT (OUTPATIENT)
Dept: OCCUPATIONAL THERAPY | Facility: CLINIC | Age: 62
End: 2022-09-06
Payer: COMMERCIAL

## 2022-09-06 DIAGNOSIS — M79.644 PAIN OF FINGER OF RIGHT HAND: Primary | ICD-10-CM

## 2022-09-06 DIAGNOSIS — M25.641 STIFFNESS OF FINGER JOINT, RIGHT: ICD-10-CM

## 2022-09-06 PROCEDURE — 97535 SELF CARE MNGMENT TRAINING: CPT | Mod: GO | Performed by: OCCUPATIONAL THERAPIST

## 2022-09-06 PROCEDURE — 97110 THERAPEUTIC EXERCISES: CPT | Mod: GO | Performed by: OCCUPATIONAL THERAPIST

## 2022-09-06 NOTE — PROGRESS NOTES
Discharge Summary - Hand Therapy    Please accept latest progress note as objective information and discharge info.  Pt has met STG and continues o work on LTG independently. He is independent with HEP. D/C from hand therapy.

## 2022-09-09 ENCOUNTER — OFFICE VISIT (OUTPATIENT)
Dept: INTERNAL MEDICINE | Facility: CLINIC | Age: 62
End: 2022-09-09
Payer: COMMERCIAL

## 2022-09-09 VITALS
HEART RATE: 64 BPM | TEMPERATURE: 98.8 F | SYSTOLIC BLOOD PRESSURE: 112 MMHG | WEIGHT: 220 LBS | OXYGEN SATURATION: 99 % | RESPIRATION RATE: 16 BRPM | DIASTOLIC BLOOD PRESSURE: 66 MMHG | HEIGHT: 67 IN | BODY MASS INDEX: 34.53 KG/M2

## 2022-09-09 DIAGNOSIS — E03.9 ACQUIRED HYPOTHYROIDISM: ICD-10-CM

## 2022-09-09 DIAGNOSIS — I10 BENIGN ESSENTIAL HYPERTENSION: Primary | ICD-10-CM

## 2022-09-09 DIAGNOSIS — E78.5 HYPERLIPIDEMIA LDL GOAL <130: ICD-10-CM

## 2022-09-09 DIAGNOSIS — Z23 NEED FOR PROPHYLACTIC VACCINATION AND INOCULATION AGAINST INFLUENZA: ICD-10-CM

## 2022-09-09 DIAGNOSIS — N52.9 ERECTILE DYSFUNCTION, UNSPECIFIED ERECTILE DYSFUNCTION TYPE: ICD-10-CM

## 2022-09-09 DIAGNOSIS — E66.01 MORBID OBESITY (H): ICD-10-CM

## 2022-09-09 PROCEDURE — 90471 IMMUNIZATION ADMIN: CPT | Performed by: INTERNAL MEDICINE

## 2022-09-09 PROCEDURE — 99214 OFFICE O/P EST MOD 30 MIN: CPT | Mod: 25 | Performed by: INTERNAL MEDICINE

## 2022-09-09 PROCEDURE — 90682 RIV4 VACC RECOMBINANT DNA IM: CPT | Performed by: INTERNAL MEDICINE

## 2022-09-09 RX ORDER — ATENOLOL 50 MG/1
50 TABLET ORAL DAILY
Qty: 90 TABLET | Refills: 3 | Status: SHIPPED | OUTPATIENT
Start: 2022-09-09 | End: 2023-11-01

## 2022-09-09 RX ORDER — SILDENAFIL CITRATE 20 MG/1
TABLET ORAL
Qty: 20 TABLET | Refills: 1 | Status: SHIPPED | OUTPATIENT
Start: 2022-09-09 | End: 2023-08-18

## 2022-09-09 ASSESSMENT — PATIENT HEALTH QUESTIONNAIRE - PHQ9
SUM OF ALL RESPONSES TO PHQ QUESTIONS 1-9: 3
10. IF YOU CHECKED OFF ANY PROBLEMS, HOW DIFFICULT HAVE THESE PROBLEMS MADE IT FOR YOU TO DO YOUR WORK, TAKE CARE OF THINGS AT HOME, OR GET ALONG WITH OTHER PEOPLE: NOT DIFFICULT AT ALL
SUM OF ALL RESPONSES TO PHQ QUESTIONS 1-9: 3

## 2022-09-09 NOTE — PROGRESS NOTES
"  Assessment & Plan     Benign essential hypertension  Well-controlled, continue medication, schedule future lab  - atenolol (TENORMIN) 50 MG tablet; Take 1 tablet (50 mg) by mouth daily ### DO NOT FILL NOW.  Please update patient's profile to reflect additional refills.  ####  - Basic metabolic panel  (Ca, Cl, CO2, Creat, Gluc, K, Na, BUN); Future  - Albumin Random Urine Quantitative with Creat Ratio; Future    Morbid obesity (H)  Weight has decreased so is no longer in the morbid obesity range, continue working on this    Acquired hypothyroidism  Recheck lab  - TSH with free T4 reflex; Future    Hyperlipidemia LDL goal <130  Recheck lab  - Lipid panel reflex to direct LDL Fasting; Future    Erectile dysfunction, unspecified erectile dysfunction type  Discussed treatments, started on sildenafil, first 60 mg, can increase up to 100 mg.  Advised on how to use medication, the risks and potential side effects, seek medical attention for any vision loss or persistent erection.  He is not on any medication that would be contraindicated.  - sildenafil (REVATIO) 20 MG tablet; 3-5 tablets as needed    Need for prophylactic vaccination and inoculation against influenza    - INFLUENZA QUAD, RECOMBINANT, P-FREE (RIV4) (FLUBLOK)      BMI:   Estimated body mass index is 34.46 kg/m  as calculated from the following:    Height as of this encounter: 1.702 m (5' 7\").    Weight as of this encounter: 99.8 kg (220 lb).   Weight management plan: Discussed healthy diet and exercise guidelines        No follow-ups on file.    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  Symptom intensity:  Severe  Symptom progression:  Staying the same  Had these symptoms before:  No    He " eats 0-1 servings of fruits and vegetables daily.He consumes 0 sweetened beverage(s) daily.He exercises with enough effort to increase his heart rate 20 to 29 minutes per day.  He exercises with enough effort to increase his heart rate 7 days per week.   He is taking medications regularly.    Today's PHQ-9         PHQ-9 Total Score: 3    PHQ-9 Q9 Thoughts of better off dead/self-harm past 2 weeks :   Not at all    How difficult have these problems made it for you to do your work, take care of things at home, or get along with other people: Not difficult at all       Hyperlipidemia Follow-Up      Are you regularly taking any medication or supplement to lower your cholesterol?   No    Are you having muscle aches or other side effects that you think could be caused by your cholesterol lowering medication?  No    Hypertension Follow-up      Do you check your blood pressure regularly outside of the clinic? Yes occasionally     Are you following a low salt diet? Yes    Are your blood pressures ever more than 140 on the top number (systolic) OR more   than 90 on the bottom number (diastolic), for example 140/90? No    Hypothyroidism Follow-up    Since last visit, patient describes the following symptoms: Weight stable, no hair loss, no skin changes, no constipation, no loose stools     Other problems:   1.  Morbid obesity: Has lost some weight, BMI is now less than 35      Current Concerns:   Erectile dysfunction: He is having difficulty with maintaining an erection.  This has been noticed relatively recently as he is in a new relationship.  It is causing significant issues with this relationship so he would like to try medication.  He has never been on any erectile dysfunction medications in the past.    Patient Active Problem List   Diagnosis     ANN (obstructive sleep apnea)     Hyperlipidemia LDL goal <130     Benign essential hypertension     Acquired hypothyroidism     Morbid obesity (H)       Current Outpatient  "Medications   Medication Sig Dispense Refill     ASPIRIN ADULT LOW STRENGTH 81 MG EC tablet Take 1 tablet by mouth daily       atenolol (TENORMIN) 50 MG tablet Take 1 tablet (50 mg) by mouth daily ### DO NOT FILL NOW.  Please update patient's profile to reflect additional refills.  #### 90 tablet 3     calcium carbonate 750 MG CHEW Take 1,500 mg by mouth daily       Cholecalciferol (VITAMIN D3) 50 MCG (2000 UT) CAPS TAKE TWO CAPSULES BY MOUTH DAILY 180 capsule 3     levothyroxine (SYNTHROID/LEVOTHROID) 150 MCG tablet TAKE 1 TABLET(150 MCG) BY MOUTH DAILY 90 tablet 0     lisinopril (ZESTRIL) 10 MG tablet TAKE 1 TABLET(10 MG) BY MOUTH DAILY 90 tablet 0     triamterene-HCTZ (MAXZIDE-25) 37.5-25 MG tablet TAKE 1 TABLET BY MOUTH DAILY 90 tablet 0         Social History     Tobacco Use     Smoking status: Never Smoker     Smokeless tobacco: Never Used   Substance Use Topics     Alcohol use: Yes            Review of Systems   No fever, chills, no dyspnea on exertion, no chest pain, palpitations, PND, orthopnea, edema, syncope, headache, abdominal pain       Objective    /66   Pulse 64   Temp 98.8  F (37.1  C) (Oral)   Resp 16   Ht 1.702 m (5' 7\")   Wt 99.8 kg (220 lb)   SpO2 99%   BMI 34.46 kg/m    Body mass index is 34.46 kg/m .  Physical Exam     Lungs: clear  CV: normal S1, S2 without murmur, S3 or S4.   No edema  Pulses full, no carotid bruits                    "

## 2022-09-12 ENCOUNTER — TELEPHONE (OUTPATIENT)
Dept: INTERNAL MEDICINE | Facility: CLINIC | Age: 62
End: 2022-09-12

## 2022-09-13 NOTE — TELEPHONE ENCOUNTER
PRIOR AUTHORIZATION DENIED    Medication: sildenafil (REVATIO) 20 MG tablet - EPA DENIED    Denial Date: 9/10/2022    Denial Rational:       Appeal Information:

## 2022-09-23 ENCOUNTER — TELEPHONE (OUTPATIENT)
Dept: INTERNAL MEDICINE | Facility: CLINIC | Age: 62
End: 2022-09-23

## 2022-09-23 ENCOUNTER — LAB (OUTPATIENT)
Dept: LAB | Facility: CLINIC | Age: 62
End: 2022-09-23
Payer: COMMERCIAL

## 2022-09-23 DIAGNOSIS — E78.5 HYPERLIPIDEMIA LDL GOAL <130: ICD-10-CM

## 2022-09-23 DIAGNOSIS — E03.9 ACQUIRED HYPOTHYROIDISM: ICD-10-CM

## 2022-09-23 DIAGNOSIS — I10 BENIGN ESSENTIAL HYPERTENSION: ICD-10-CM

## 2022-09-23 PROCEDURE — 80061 LIPID PANEL: CPT

## 2022-09-23 PROCEDURE — 84443 ASSAY THYROID STIM HORMONE: CPT

## 2022-09-23 PROCEDURE — 82043 UR ALBUMIN QUANTITATIVE: CPT

## 2022-09-23 PROCEDURE — 80048 BASIC METABOLIC PNL TOTAL CA: CPT

## 2022-09-23 PROCEDURE — 36415 COLL VENOUS BLD VENIPUNCTURE: CPT

## 2022-09-23 NOTE — TELEPHONE ENCOUNTER
Called patient and left a detailed message on private VM. Dr. Mistry is out of the office today, his appointment with her has been cancelled. Dr. Lowe has offered to see patient and complete his pre-op today, however he needs to arrive by 10:30 AM today if he would like her to do the pre-op. If that does not work, please help patient schedule with another provider.

## 2022-09-24 LAB
ANION GAP SERPL CALCULATED.3IONS-SCNC: 7 MMOL/L (ref 3–14)
BUN SERPL-MCNC: 18 MG/DL (ref 7–30)
CALCIUM SERPL-MCNC: 9.6 MG/DL (ref 8.5–10.1)
CHLORIDE BLD-SCNC: 101 MMOL/L (ref 94–109)
CHOLEST SERPL-MCNC: 215 MG/DL
CO2 SERPL-SCNC: 28 MMOL/L (ref 20–32)
CREAT SERPL-MCNC: 1.14 MG/DL (ref 0.66–1.25)
CREAT UR-MCNC: 216 MG/DL
FASTING STATUS PATIENT QL REPORTED: YES
GFR SERPL CREATININE-BSD FRML MDRD: 72 ML/MIN/1.73M2
GLUCOSE BLD-MCNC: 93 MG/DL (ref 70–99)
HDLC SERPL-MCNC: 57 MG/DL
LDLC SERPL CALC-MCNC: 139 MG/DL
MICROALBUMIN UR-MCNC: 13 MG/L
MICROALBUMIN/CREAT UR: 6.02 MG/G CR (ref 0–17)
NONHDLC SERPL-MCNC: 158 MG/DL
POTASSIUM BLD-SCNC: 4 MMOL/L (ref 3.4–5.3)
SODIUM SERPL-SCNC: 136 MMOL/L (ref 133–144)
TRIGL SERPL-MCNC: 94 MG/DL
TSH SERPL DL<=0.005 MIU/L-ACNC: 2.17 MU/L (ref 0.4–4)

## 2022-09-26 ENCOUNTER — OFFICE VISIT (OUTPATIENT)
Dept: FAMILY MEDICINE | Facility: CLINIC | Age: 62
End: 2022-09-26
Payer: COMMERCIAL

## 2022-09-26 VITALS
WEIGHT: 222.3 LBS | BODY MASS INDEX: 33.69 KG/M2 | SYSTOLIC BLOOD PRESSURE: 107 MMHG | DIASTOLIC BLOOD PRESSURE: 69 MMHG | HEIGHT: 68 IN | TEMPERATURE: 97.9 F | OXYGEN SATURATION: 98 % | HEART RATE: 56 BPM

## 2022-09-26 DIAGNOSIS — H25.9 AGE-RELATED CATARACT OF BOTH EYES, UNSPECIFIED AGE-RELATED CATARACT TYPE: ICD-10-CM

## 2022-09-26 DIAGNOSIS — Z01.818 PREOP EXAMINATION: Primary | ICD-10-CM

## 2022-09-26 PROCEDURE — 99214 OFFICE O/P EST MOD 30 MIN: CPT | Performed by: NURSE PRACTITIONER

## 2022-09-26 ASSESSMENT — ANXIETY QUESTIONNAIRES
IF YOU CHECKED OFF ANY PROBLEMS ON THIS QUESTIONNAIRE, HOW DIFFICULT HAVE THESE PROBLEMS MADE IT FOR YOU TO DO YOUR WORK, TAKE CARE OF THINGS AT HOME, OR GET ALONG WITH OTHER PEOPLE: NOT DIFFICULT AT ALL
5. BEING SO RESTLESS THAT IT IS HARD TO SIT STILL: NOT AT ALL
7. FEELING AFRAID AS IF SOMETHING AWFUL MIGHT HAPPEN: NOT AT ALL
GAD7 TOTAL SCORE: 1
3. WORRYING TOO MUCH ABOUT DIFFERENT THINGS: SEVERAL DAYS
8. IF YOU CHECKED OFF ANY PROBLEMS, HOW DIFFICULT HAVE THESE MADE IT FOR YOU TO DO YOUR WORK, TAKE CARE OF THINGS AT HOME, OR GET ALONG WITH OTHER PEOPLE?: NOT DIFFICULT AT ALL
6. BECOMING EASILY ANNOYED OR IRRITABLE: NOT AT ALL
GAD7 TOTAL SCORE: 1
GAD7 TOTAL SCORE: 1
7. FEELING AFRAID AS IF SOMETHING AWFUL MIGHT HAPPEN: NOT AT ALL
4. TROUBLE RELAXING: NOT AT ALL
1. FEELING NERVOUS, ANXIOUS, OR ON EDGE: NOT AT ALL
2. NOT BEING ABLE TO STOP OR CONTROL WORRYING: NOT AT ALL

## 2022-09-26 ASSESSMENT — PATIENT HEALTH QUESTIONNAIRE - PHQ9
SUM OF ALL RESPONSES TO PHQ QUESTIONS 1-9: 1
10. IF YOU CHECKED OFF ANY PROBLEMS, HOW DIFFICULT HAVE THESE PROBLEMS MADE IT FOR YOU TO DO YOUR WORK, TAKE CARE OF THINGS AT HOME, OR GET ALONG WITH OTHER PEOPLE: NOT DIFFICULT AT ALL
SUM OF ALL RESPONSES TO PHQ QUESTIONS 1-9: 1

## 2022-09-26 NOTE — PROGRESS NOTES
[address] [place]  [facility] [address]  Phone: [phone]  Primary Provider: Sheridan Mistry  Pre-op Performing Provider: TENISHA MCDANIEL    PREOPERATIVE EVALUATION:  Today's date: 9/26/2022    Grant Shoemaker is a 62 year old male who presents for a preoperative evaluation.    Surgical Information:Surgery/Procedure: CATARACT EXTRACTION WITH INTRAOCULAR LENS IMPLANT  Surgery Location: [facility]  Surgeon: Michelle Garner  Surgery Date: 09/28/22 left and 10/12/22 right  Time of Surgery: 11:55 and 9:00 am   Where patient plans to recover: At home with family  Fax number for surgical facility:     Type of Anesthesia Anticipated: Local with MAC    Assessment & Plan     The proposed surgical procedure is considered INTERMEDIATE risk.    Preop examination    Age-related cataract of both eyes, unspecified age-related cataract type       Risks and Recommendations:  The patient has the following additional risks and recommendations for perioperative complications:   - No identified additional risk factors other than previously addressed    Medication Instructions:  Patient is to take all scheduled medications on the day of surgery    RECOMMENDATION:  APPROVAL GIVEN to proceed with proposed procedure, without further diagnostic evaluation.          Tenisha Mcdaniel CNP        Subjective     HPI related to upcoming procedure: bilateral cataracts    Preop Questions 9/26/2022   1. Have you ever had a heart attack or stroke? No   2. Have you ever had surgery on your heart or blood vessels, such as a stent placement, a coronary artery bypass, or surgery on an artery in your head, neck, heart, or legs? No   3. Do you have chest pain with activity? No   4. Do you have a history of  heart failure? No   5. Do you currently have a cold, bronchitis or symptoms of other infection? No   6. Do you have a cough, shortness of breath, or wheezing? No   7. Do you or anyone in  your family have previous history of blood clots? No   8. Do you or does anyone in your family have a serious bleeding problem such as prolonged bleeding following surgeries or cuts? No   9. Have you ever had problems with anemia or been told to take iron pills? No   10. Have you had any abnormal blood loss such as black, tarry or bloody stools? No   11. Have you ever had a blood transfusion? No   12. Are you willing to have a blood transfusion if it is medically needed before, during, or after your surgery? Yes   13. Have you or any of your relatives ever had problems with anesthesia? No   14. Do you have sleep apnea, excessive snoring or daytime drowsiness? YES - CPAP use.    14a. Do you have a CPAP machine? Yes   15. Do you have any artifical heart valves or other implanted medical devices like a pacemaker, defibrillator, or continuous glucose monitor? No   16. Do you have artificial joints? No   17. Are you allergic to latex? No       Health Care Directive:  Patient does not have a Health Care Directive or Living Will: Discussed advance care planning with patient; however, patient declined at this time.    Preoperative Review of :   reviewed - no record of controlled substances prescribed.      Status of Chronic Conditions:  See problem list for active medical problems.  Problems all longstanding and stable, except as noted/documented.  See ROS for pertinent symptoms related to these conditions.      Review of Systems  CONSTITUTIONAL: NEGATIVE for fever, chills, change in weight  INTEGUMENTARY/SKIN: NEGATIVE for worrisome rashes, moles or lesions  EYES: NEGATIVE for vision changes or irritation  ENT/MOUTH: NEGATIVE for ear, mouth and throat problems  RESP: NEGATIVE for significant cough or SOB  CV: NEGATIVE for chest pain, palpitations or peripheral edema  GI: NEGATIVE for nausea, abdominal pain, heartburn, or change in bowel habits  : NEGATIVE for frequency, dysuria, or hematuria  MUSCULOSKELETAL:  NEGATIVE for significant arthralgias or myalgia  NEURO: NEGATIVE for weakness, dizziness or paresthesias  ENDOCRINE: NEGATIVE for temperature intolerance, skin/hair changes  HEME: NEGATIVE for bleeding problems  PSYCHIATRIC: NEGATIVE for changes in mood or affect    Patient Active Problem List    Diagnosis Date Noted     Morbid obesity (H) 03/17/2021     Priority: Medium     ANN (obstructive sleep apnea) 03/17/2020     Priority: Medium     On CPAP  CPAP Style: MiniMonos Dreamstation 500   Asset/machine and modem s/n #: [id number]  Initial or replacement: Replacement  Setup location: [place]  Date of setup: 5/24/2018  Ordering Provider: Virgen RIZVI   Pressure: Auto 5-20cm h2o   Sleep study date: 08/09/1994 baseline @ [facility]   AHI/ ERIKA: 7.4 hr         Hyperlipidemia LDL goal <130 03/17/2020     Priority: Medium     Benign essential hypertension 03/17/2020     Priority: Medium     Acquired hypothyroidism 03/17/2020     Priority: Medium      Past Medical History:   Diagnosis Date     Adenomatous polyp of colon      Hyperlipidemia      Hypertension      Hypothyroidism      ANN (obstructive sleep apnea)      Past Surgical History:   Procedure Laterality Date     BACK SURGERY  1999     COLONOSCOPY  4/2018     RELEASE CARPAL TUNNEL       RELEASE TRIGGER FINGER      x6     ZZC CERV ARTIFIC DISKECTOMY       ZZC LAMINOTOMY, SINGLE CERVICAL       Current Outpatient Medications   Medication Sig Dispense Refill     ASPIRIN ADULT LOW STRENGTH 81 MG EC tablet Take 1 tablet by mouth daily       atenolol (TENORMIN) 50 MG tablet Take 1 tablet (50 mg) by mouth daily ### DO NOT FILL NOW.  Please update patient's profile to reflect additional refills.  #### 90 tablet 3     calcium carbonate 750 MG CHEW Take 1,500 mg by mouth daily       Cholecalciferol (VITAMIN D3) 50 MCG (2000 UT) CAPS TAKE TWO CAPSULES BY MOUTH DAILY 180 capsule 3     levothyroxine  "(SYNTHROID/LEVOTHROID) 150 MCG tablet TAKE 1 TABLET(150 MCG) BY MOUTH DAILY 90 tablet 0     lisinopril (ZESTRIL) 10 MG tablet TAKE 1 TABLET(10 MG) BY MOUTH DAILY 90 tablet 0     triamterene-HCTZ (MAXZIDE-25) 37.5-25 MG tablet TAKE 1 TABLET BY MOUTH DAILY 90 tablet 0     sildenafil (REVATIO) 20 MG tablet 3-5 tablets as needed (Patient not taking: Reported on 9/26/2022) 20 tablet 1       No Known Allergies     Social History     Tobacco Use     Smoking status: Never Smoker     Smokeless tobacco: Never Used   Substance Use Topics     Alcohol use: Not Currently     Comment: extremely rare     Family History   Problem Relation Age of Onset     Pancreatic Cancer Mother      Other Cancer Mother         Pancreas     Esophageal Cancer Father      Other Cancer Father         Brain, Esophagus     Thyroid Disease Sister      Breast Cancer Sister      Obesity Sister      Mental Illness Paternal Grandmother         bipolar     History   Drug Use Unknown         Objective     /69   Pulse 56   Temp 97.9  F (36.6  C) (Tympanic)   Ht 1.727 m (5' 8\")   Wt 100.8 kg (222 lb 4.8 oz)   SpO2 98%   BMI 33.80 kg/m      Physical Exam    GENERAL APPEARANCE: healthy, alert and no distress     EYES: EOMI, PERRL     HENT: ear canals and TM's normal and nose and mouth without ulcers or lesions     NECK: no adenopathy, no asymmetry, masses, or scars and thyroid normal to palpation     RESP: lungs clear to auscultation - no rales, rhonchi or wheezes     CV: regular rates and rhythm, normal S1 S2, no S3 or S4 and no murmur, click or rub     ABDOMEN:  soft, nontender, no HSM or masses and bowel sounds normal     MS: extremities normal- no gross deformities noted, no evidence of inflammation in joints, FROM in all extremities.     SKIN: no suspicious lesions or rashes     NEURO: Normal strength and tone, sensory exam grossly normal, mentation intact and speech normal     PSYCH: mentation appears normal. and affect normal/bright     " LYMPHATICS: No cervical adenopathy    Recent Labs   Lab Test 09/23/22  1217 03/11/21  1050    135   POTASSIUM 4.0 4.3   CR 1.14 1.18        Diagnostics:  No labs were ordered during this visit.   No EKG required for low risk surgery (cataract, skin procedure, breast biopsy, etc).    Revised Cardiac Risk Index (RCRI):  The patient has the following serious cardiovascular risks for perioperative complications:   - No serious cardiac risks = 0 points     RCRI Interpretation: 0 points: Class I (very low risk - 0.4% complication rate)           Signed Electronically by: Tenisha Mcdaniel CNP  Copy of this evaluation report is provided to requesting physician.      Answers for HPI/ROS submitted by the patient on 9/26/2022  If you checked off any problems, how difficult have these problems made it for you to do your work, take care of things at home, or get along with other people?: Not difficult at all  PHQ9 TOTAL SCORE: 1  DAVID 7 TOTAL SCORE: 1

## 2022-11-11 DIAGNOSIS — G47.33 OBSTRUCTIVE SLEEP APNEA (ADULT) (PEDIATRIC): Primary | ICD-10-CM

## 2022-12-03 ENCOUNTER — NURSE TRIAGE (OUTPATIENT)
Dept: NURSING | Facility: CLINIC | Age: 62
End: 2022-12-03

## 2022-12-03 NOTE — TELEPHONE ENCOUNTER
Addendum    12-3-2022               2:51 pm     Patient calling back requesting phone number for eye surgeon  Dr. Buckley, [phone].  Writer advised patient to call and request to have on call eye provider Karissa bustos RN, MA  [facility] Triage Nurse Advisor                 Nurse Triage SBAR    Is this a 2nd Level Triage? NO    Situation:  Possible pink eye.  Has had recent cataract surgery 09/28/2022 on one eye and 10/26/2022 on the other eye.  Eye physician is through [facility].  Dr Garner    Background: Patient,Andrew, calling. Consent: not needed.    Assessment:  Sclera is yellow, and the capillaries look red and is slightly irritated. Makes the eye look pink in color. Mild itching if made aware of his eye, looking at it. Symptoms started this AM.  Denies any eye discharge. Was on antibiotic eye drops but finished those 2 weeks ago post surgery.  No swelling or redness around the eye itself.       Protocol Recommended Disposition: See Physician within 3 days Encouraged to contact his Opthalmologist first due to recent cataract surgery and so far, symptoms are NOT classic pink eye. Could be early on in the process.    Recommendation: According to the protocol, Patient should be seen within 3 days. Advised Patient to contact [facility] and ask to speak to the on call physician. Phone numnber given for them, and for their nurse line as well. If, for some reason, they cannot help, or he is unable to get a hold of an on call provider, I advised him to then, give us a call back and we can page the on call physician for his PCP. Care advice given. Patient verbalizes understanding and agrees with plan of care. Reviewed concerning symptoms and when to call back and patient verbalized understanding and agreed to follow care advice given.         Deborah Pastrana RN  [facility] Nurse Advisor  12/3/2022 at 11:11 AM         Reason for Disposition    [1] Mild eye pain AND [2] present < 24  "hours    Bleeding on white of the eye    Additional Information    Negative: Followed an eye injury    Negative: Eye pain from chemical in the eye    Negative: Eye pain from foreign body in eye    Negative: [1] Tender, red lump or pimple AND [2] located along the eyelid margin    Negative: Has sinus pain or pressure    Negative: SEVERE eye pain    Negative: Complete loss of vision in one or both eyes    Negative: [1] Eyelids are very swollen (shut or almost) AND [2] fever    Negative: [1] Eyelid (outer) is very red AND [2] fever    Negative: [1] Foreign body sensation (\"feels like something is in there\") AND [2] irrigation didn't help    Negative: Vomiting    Negative: Ulcer or sore seen on the cornea (clear center part of the eye)    Negative: [1] Recent eye surgery AND [2] increasing eye pain    Negative: [1] Blurred vision AND [2] new or worsening    Negative: Patient sounds very sick or weak to the triager    Negative: MODERATE eye pain or discomfort (e.g., interferes with normal activities or awakens from sleep; more than mild)    Negative: [1] Eyelids are very swollen (shut or almost) AND [2] no fever    Negative: [1] Painful rash near eye AND [2] multiple small blisters grouped together    Negative: Pain followed bright light exposure from welding    Negative: Looking at light causes MODERATE to SEVERE eye pain (i.e., photophobia)    Negative: Yellow or green pus occurs    Negative: Eye pain present > 24 hours    Negative: [1] MILD eyelid  pain is a recurrent problem AND [2] red and crusty eyelids    Negative: MILD eye pains are a recurrent problem    Negative: [1] Mild eye pain caused by sunscreen, smoke, smog, chlorine, food, soap or other mild irritant AND [2] no blurred vision    Negative: [1] Mild eye pain caused by sun lamp or excessive sun exposure AND [2] no blurred vision    Negative: [1] Mild eye pain caused by wearing contact lenses AND [2] no blurred vision    Negative: [1] Mild eye pain with " "foreign body sensation (\"feels like something is in there\") AND [2] has not attempted irrigation    Negative: [1] Mild eye pains are a recurrent problem AND [2] related to staring at computer screen    Negative: Chemical got in the eye    Negative: Piece of something got in the eye    Negative: Eye redness followed an eye injury    Negative: Yellow or green pus in the eyes    Negative: [1] Eyelid is swollen AND [2] no redness of white of eye (sclera)    Negative: Caused by pollen or other allergy OR main symptom is itchy eyes    Negative: Red, widespread rash also present    Negative: SEVERE eye pain    Negative: [1] Eyelids are very swollen (shut or almost) AND [2] fever    Negative: [1] Eyelid (outer) is very red (or tender to touch) AND [2] fever    Negative: [1] Foreign body sensation (\"feels like something is in there\") AND [2] irrigation didn't help    Negative: Vomiting    Negative: [1] Recent eye surgery AND [2] increasing eye pain    Negative: Patient sounds very sick or weak to the triager    Negative: MODERATE eye pain or discomfort (e.g., interferes with normal activities or awakens from sleep; more than mild)    Negative: New or worsening blurred vision    Negative: Looking at light causes MODERATE to SEVERE eye pain (i.e., photophobia)    Negative: Cloudy spot or sore seen on the cornea (clear part of the eye)    Negative: Eyelid is very swollen (shut or almost)    Negative: Eyelid is red and painful (or tender to touch)    Negative: Eye pain present > 24 hours    Negative: [1] Bleeding on white of the eye AND [2] taking Coumadin (warfarin) or other strong blood thinner, or known bleeding disorder (e.g., thrombocytopenia)    Protocols used: EYE PAIN AND OTHER SYMPTOMS-A-AH, EYE - RED WITHOUT PUS-A-AH      "

## 2022-12-04 ENCOUNTER — OFFICE VISIT (OUTPATIENT)
Dept: URGENT CARE | Facility: URGENT CARE | Age: 62
End: 2022-12-04
Payer: COMMERCIAL

## 2022-12-04 VITALS
RESPIRATION RATE: 18 BRPM | TEMPERATURE: 97.6 F | DIASTOLIC BLOOD PRESSURE: 58 MMHG | HEART RATE: 54 BPM | OXYGEN SATURATION: 97 % | SYSTOLIC BLOOD PRESSURE: 106 MMHG

## 2022-12-04 DIAGNOSIS — H11.32 SUBCONJUNCTIVAL HEMORRHAGE, LEFT: Primary | ICD-10-CM

## 2022-12-04 PROCEDURE — 99213 OFFICE O/P EST LOW 20 MIN: CPT | Performed by: FAMILY MEDICINE

## 2022-12-04 ASSESSMENT — PAIN SCALES - GENERAL: PAINLEVEL: NO PAIN (0)

## 2022-12-04 NOTE — PATIENT INSTRUCTIONS
Follow up with your ophthalmologist (Michelle Buckley MD) at the [facility] if not better in 2 weeks.      Go to the emergency room if you experience decreased vision/eye pain/worsening light sensitivity.

## 2022-12-04 NOTE — PROGRESS NOTES
SUBJECTIVE:  Chief Complaint:   Chief Complaint   Patient presents with     Urgent Care     Redness in eyes and itchy since yesterday. No vision changes or pain.      History of Present Illness:  Grant Shoemaker is a 62 year old male who is s/p cataract surgery on September 28, 2022, and on October 26, 2022 (His ophthalmologist Dr. Garner is at the [facility] presents   complaining of pink discoloration/redness and mild itching at both eyes since yesterday   No sneezing.  There was some watering left eye  No eye pain.  No vision problems.  No eye discharge.  Patient is no longer on eye drops (He had been on them for two weeks after the eye surgeries).      No increased coughing/sneezng/straining.  Patient is not on blood thinners.  He denies recent high blood pressure  .   Contact wearer : no    Past Medical History:   Diagnosis Date     Adenomatous polyp of colon      Hyperlipidemia      Hypertension      Hypothyroidism      ANN (obstructive sleep apnea)      Current Outpatient Medications   Medication Sig Dispense Refill     ASPIRIN ADULT LOW STRENGTH 81 MG EC tablet Take 1 tablet by mouth daily       atenolol (TENORMIN) 50 MG tablet Take 1 tablet (50 mg) by mouth daily ### DO NOT FILL NOW.  Please update patient's profile to reflect additional refills.  #### 90 tablet 3     calcium carbonate 750 MG CHEW Take 1,500 mg by mouth daily       Cholecalciferol (VITAMIN D3) 50 MCG (2000 UT) CAPS TAKE TWO CAPSULES BY MOUTH DAILY 180 capsule 3     levothyroxine (SYNTHROID/LEVOTHROID) 150 MCG tablet TAKE 1 TABLET(150 MCG) BY MOUTH DAILY 90 tablet 3     lisinopril (ZESTRIL) 10 MG tablet TAKE 1 TABLET(10 MG) BY MOUTH DAILY 90 tablet 3     sildenafil (REVATIO) 20 MG tablet 3-5 tablets as needed 20 tablet 1     triamterene-HCTZ (MAXZIDE-25) 37.5-25 MG tablet TAKE 1 TABLET BY MOUTH DAILY 90 tablet 3        ROS:  CONSTITUTIONAL:no fevers.    EYES:  Positive for redness at the eyes.      OBJECTIVE:  /58   Pulse 54   Temp  97.6  F (36.4  C) (Tympanic)   Resp 18   SpO2 97%   General: no acute distress  Eye exam: left eye has subconjunctival hemorrhage present at the medial and inferior aspects of the eyeball.  The right eye is within normal limits.  .    ASSESSMENT:  Subconjunctival hemorrhage of the left eye.      PLAN:  Follow up with your ophthalmologist (Michelle Garner MD) at the [facility] in [place] if not better in 2 weeks.      Go to the emergency room if you experience decreased vision/eye pain/worsening light sensitivity.  peggy Maza MD

## 2023-01-18 ENCOUNTER — HOSPITAL ENCOUNTER (EMERGENCY)
Facility: CLINIC | Age: 63
Discharge: HOME | End: 2023-01-18
Attending: EMERGENCY MEDICINE | Admitting: EMERGENCY MEDICINE
Payer: COMMERCIAL

## 2023-01-18 ENCOUNTER — NURSE TRIAGE (OUTPATIENT)
Dept: NURSING | Facility: CLINIC | Age: 63
End: 2023-01-18
Payer: COMMERCIAL

## 2023-01-18 ENCOUNTER — APPOINTMENT (OUTPATIENT)
Dept: CT IMAGING | Facility: CLINIC | Age: 63
End: 2023-01-18
Attending: EMERGENCY MEDICINE
Payer: COMMERCIAL

## 2023-01-18 VITALS
HEART RATE: 75 BPM | WEIGHT: 228.84 LBS | TEMPERATURE: 97.1 F | BODY MASS INDEX: 34.79 KG/M2 | OXYGEN SATURATION: 99 % | RESPIRATION RATE: 16 BRPM | DIASTOLIC BLOOD PRESSURE: 102 MMHG | SYSTOLIC BLOOD PRESSURE: 159 MMHG

## 2023-01-18 DIAGNOSIS — S01.01XA SCALP LACERATION, INITIAL ENCOUNTER: ICD-10-CM

## 2023-01-18 PROCEDURE — 99284 EMERGENCY DEPT VISIT MOD MDM: CPT | Mod: 25

## 2023-01-18 PROCEDURE — 12001 RPR S/N/AX/GEN/TRNK 2.5CM/<: CPT

## 2023-01-18 PROCEDURE — 70450 CT HEAD/BRAIN W/O DYE: CPT

## 2023-01-18 ASSESSMENT — ENCOUNTER SYMPTOMS
DIZZINESS: 0
BACK PAIN: 0
NUMBNESS: 0
NAUSEA: 0
VOMITING: 0
NECK PAIN: 0
WOUND: 1

## 2023-01-18 ASSESSMENT — ACTIVITIES OF DAILY LIVING (ADL): ADLS_ACUITY_SCORE: 35

## 2023-01-19 NOTE — ED PROVIDER NOTES
History   Chief Complaint:  Fall and Laceration    The history is provided by the patient.      Grant Shoemaker is a 62 year old male on aspirin who presents with an posterior head laceration after a fall. Grant explains that he fell backwards on ice causing him to hit the back of the head. Denies loss of consciousness and inability to stand after his fall. He noticed the laceration to the back of his head because of the blood on his head after palpating where he fell. Denies nausea, vomiting, visual cuts, numbness, paresthesias, chest pain, neck pain, back pain, and dizziness. Grant does not feel that he has had any visual changes; however, he is not certain secondary to recent cataract removal.     Independent Historian: None.      ROS:  Review of Systems   Eyes: Negative for visual disturbance.   Cardiovascular: Negative for chest pain.   Gastrointestinal: Negative for nausea and vomiting.   Musculoskeletal: Negative for back pain and neck pain.   Skin: Positive for wound.   Neurological: Negative for dizziness, syncope and numbness.     Allergies:  Contrast Dye  Food     Medications:    Aspirin   Atenolol   Levothyroxine   Lisinopril   Sildenafil   Maxzide     Past Medical History:    Adenomatous polyp of colon   Hyperlipidemia   Hypertension   Hypothyroidism   ANN    Past Surgical History:    Back surgery   Colonoscopy   Carpal tunnel release   Trigger finger release x 6   Cervical artific diskectomy   Single cervical laminotomy     Family History:    His family history includes Breast Cancer in his sister; Esophageal Cancer in his father; Mental Illness in his paternal grandmother; Obesity in his sister; Other Cancer in his father and mother; Pancreatic Cancer in his mother; Thyroid Disease in his sister.    Social History:  He reports that he has never smoked. He has never used smokeless tobacco. He reports that he does not currently use alcohol. He reports that he does not use drugs.  PCP: Sheridan Mistry  "MD MACIE     Physical Exam     Patient Vitals for the past 24 hrs:   BP Temp Temp src Pulse Resp SpO2 Weight   01/18/23 2057 (!) 159/102 97.1  F (36.2  C) Temporal 72 18 99 % 103.8 kg (228 lb 13.4 oz)     Physical Exam  General: Patient is alert, awake and interactive when I enter the room  Head: abrasion to the occipital scalp with 0.5 linear laceration with mild active bleeding   Eyes: The pupils are equal, round, and reactive to light. Conjunctivae and sclerae are normal  ENT: No tenderness to palpation of the face, nose or jaw.   Neck: Normal range of motion. No cervical midline tenderness.   CV: Regular rate. S1/S2. No murmurs.   Resp: Lungs are clear without wheezes or rales. No distress. No crepitance.   GI: Abdomen is soft, no rigidity, guarding, or rebound. No contusion. No distension. No tenderness to palpation in any quadrant.     MS: Normal tone. Joints grossly normal without effusions. No asymmetric leg swelling, calf or thigh tenderness. Normal motor assessment of all extremities. PROM performed of all major joints without pain. No C/T/L tenderness in midline  Skin: No rash or lesions noted. Normal capillary refill noted  Neuro: GCS 15.  CN\"s II-XII intact. Speech is normal and fluent. Face is symmetric. Strength is normal and symmetric.   Psych: Normal affect.  Appropriate interactions.    Emergency Department Course     Imaging:  CT Head w/o Contrast   Final Result   IMPRESSION:   1.  Normal head CT.         Report per radiology     [facility]    -Laceration Repair    Date/Time: 1/18/2023 11:17 PM  Performed by: Marcin Ching MD  Authorized by: Marcin Ching MD     Risks, benefits and alternatives discussed.      UNIVERSAL PROTOCOL   Site Marked: NA  Prior Images Obtained and Reviewed:  Yes  Required items: Required blood products, implants, devices and special equipment available    Patient identity confirmed:  Verbally with patient, arm band, " provided demographic data and hospital-assigned identification number  Patient was reevaluated immediately before administering moderate or deep sedation or anesthesia  Confirmation Checklist:  Patient's identity using two indicators, relevant allergies, procedure was appropriate and matched the consent or emergent situation and correct equipment/implants were available  Time out: Immediately prior to the procedure a time out was called    Universal Protocol: the Joint Commission Universal Protocol was followed    Preparation: Patient was prepped and draped in usual sterile fashion      ANESTHESIA (see MAR for exact dosages):     Anesthesia method:  None  LACERATION DETAILS     Location:  Scalp    Scalp location:  Occipital    Length (cm):  0.5    REPAIR TYPE:     Repair type:  Simple        SKIN REPAIR     Repair method:  Staples    Number of staples:  1    PROCEDURE  Describe Procedure: 0.5 cm gaping laceration to the occipital scalp.   Patient Tolerance:  Patient tolerated the procedure well with no immediate complications    Emergency Department Course & Assessments:    Interventions:  Medications - No data to display     Independent Interpretation (X-rays, CTs, rhythm strip):  None.     Consultations/Discussion of Management or Tests:  ED Course as of 01/18/23 2328 Wed Jan 18, 2023 2236 I obtained history and examined the patient as noted above.    2316 I rechecked the patient and explained findings. I completed the patient's laceration repair as noted above. I discussed discharge with the patient. All questions answered.      Social Determinants of Health affecting care:  None.      Disposition:  The patient was discharged to home.     Impression & Plan      Medical Decision Making:  Grant Shoemaker is a 62 year old male who presents for evaluation after fall with trauma to the head.  His exam shows a contusion to the head with small laceration. This patient has a history and clinical exam consistent with  contusion to head. Less likely concussion given scenario but discussed with patient.  The differential diagnosis includes skull fracture, epidural hematoma, subdural hematoma, intracerebral hemorrhage, and traumatic subarachnoid hemorrhage; all of these are highly unlikely in this clinical setting given negative CT.  Risk of delayed bleed is low but patient informed of possiblity.  CT done given age and clinical concern. Laceration repaired as above. Tetanus is up to date. Return to ED for red flags (change in behavior, severe headache, drowsiness, seizures, vomiting, etc) and gave head contusion precautions for home.  I did stress importance of avoiding a second blow to head just in case he has a concussion.  His head to toe trauma exam is otherwise negative for serious underlying disease of the head, neck, chest, abdomen, extremities, pelvis.      Diagnosis:    ICD-10-CM    1. Scalp laceration, initial encounter  S01.01XA           Scribe Disclosure:  Mary Kay MONTAGUE, am serving as a scribe at 10:44 PM on 1/18/2023 to document services personally performed by Marcin Ching MD based on my observations and the provider's statements to me.    1/18/2023   Marcin Ching MD Battista, Christopher Joseph, MD  01/18/23 8556

## 2023-01-19 NOTE — ED TRIAGE NOTES
Pt went outside to let his dog out for the night when he slipped on ice falling backwards and hitting posterior head on sidewalk. Denies LOC or vomiting since fall.  81 mg aspirin daily. ABC in tact. A/OX4

## 2023-01-19 NOTE — TELEPHONE ENCOUNTER
TRIAGE CALL - Is this a 2nd Level Triage? no  Nurse Triage SBAR - Patient calling   Situation:  Fall and hit his head and called 911  Background:    EMT came and advised stiches or staples since he still bleeding   Takes aspirin  Assessment:  Coherent able to drive  No gushing bleeding but needs stiches (per paramedics)  Hit his head on the grown after falling on the ice  Recommended Disposition per protocol: ER  Patient verbalized understanding and agrees with plan  Vashti Price RN Nurse Triage Advisor 8:40 PM 1/18/2023  Reason for Disposition    [1] Bleeding AND [2] won't stop after 10 minutes of direct pressure (using correct technique)    Protocols used: CUTS AND HSRLSGVACUS-C-NP

## 2023-01-24 ENCOUNTER — ALLIED HEALTH/NURSE VISIT (OUTPATIENT)
Dept: NURSING | Facility: CLINIC | Age: 63
End: 2023-01-24
Payer: COMMERCIAL

## 2023-01-24 DIAGNOSIS — Z48.02 REMOVAL OF STAPLE: Primary | ICD-10-CM

## 2023-01-24 PROCEDURE — 99207 PR NO CHARGE NURSE ONLY: CPT | Performed by: INTERNAL MEDICINE

## 2023-01-24 NOTE — PROGRESS NOTES
Grant MEZA Marlysluis presents to the clinic for removal of staples. The patient has had staples in place for 6 days. There has been no patient reported signs or symptoms of infection or drainage. 1  staple are seen and located on the posterior head. Tetanus status is up to date. All staples were easily removed today. Routine wound care discussed by the RN or provider. The patient will follow up as needed.

## 2023-08-11 ASSESSMENT — ENCOUNTER SYMPTOMS
ABDOMINAL PAIN: 0
HEMATURIA: 0
NAUSEA: 0
PARESTHESIAS: 0
HEADACHES: 0
MYALGIAS: 0
FEVER: 0
JOINT SWELLING: 0
CHILLS: 0
HEMATOCHEZIA: 0
HEARTBURN: 0
FREQUENCY: 0
ARTHRALGIAS: 1
WEAKNESS: 0
SORE THROAT: 0
DIZZINESS: 0
EYE PAIN: 0
CONSTIPATION: 0
PALPITATIONS: 0
COUGH: 0
DIARRHEA: 0
NERVOUS/ANXIOUS: 0
SHORTNESS OF BREATH: 0
DYSURIA: 0

## 2023-08-17 ASSESSMENT — PATIENT HEALTH QUESTIONNAIRE - PHQ9
SUM OF ALL RESPONSES TO PHQ QUESTIONS 1-9: 1
SUM OF ALL RESPONSES TO PHQ QUESTIONS 1-9: 1
10. IF YOU CHECKED OFF ANY PROBLEMS, HOW DIFFICULT HAVE THESE PROBLEMS MADE IT FOR YOU TO DO YOUR WORK, TAKE CARE OF THINGS AT HOME, OR GET ALONG WITH OTHER PEOPLE: NOT DIFFICULT AT ALL

## 2023-08-18 ENCOUNTER — OFFICE VISIT (OUTPATIENT)
Dept: INTERNAL MEDICINE | Facility: CLINIC | Age: 63
End: 2023-08-18
Payer: COMMERCIAL

## 2023-08-18 VITALS
BODY MASS INDEX: 32.58 KG/M2 | RESPIRATION RATE: 18 BRPM | OXYGEN SATURATION: 98 % | HEART RATE: 51 BPM | TEMPERATURE: 97.7 F | SYSTOLIC BLOOD PRESSURE: 125 MMHG | HEIGHT: 68 IN | WEIGHT: 215 LBS | DIASTOLIC BLOOD PRESSURE: 76 MMHG

## 2023-08-18 DIAGNOSIS — E66.01 MORBID OBESITY (H): ICD-10-CM

## 2023-08-18 DIAGNOSIS — E03.9 ACQUIRED HYPOTHYROIDISM: ICD-10-CM

## 2023-08-18 DIAGNOSIS — I10 BENIGN ESSENTIAL HYPERTENSION: ICD-10-CM

## 2023-08-18 DIAGNOSIS — N18.30 STAGE 3 CHRONIC KIDNEY DISEASE, UNSPECIFIED WHETHER STAGE 3A OR 3B CKD (H): ICD-10-CM

## 2023-08-18 DIAGNOSIS — Z12.5 PROSTATE CANCER SCREENING: ICD-10-CM

## 2023-08-18 DIAGNOSIS — E78.5 HYPERLIPIDEMIA LDL GOAL <130: ICD-10-CM

## 2023-08-18 DIAGNOSIS — Z00.00 ANNUAL PHYSICAL EXAM: Primary | ICD-10-CM

## 2023-08-18 LAB
ALBUMIN SERPL BCG-MCNC: 4.9 G/DL (ref 3.5–5.2)
ALBUMIN UR-MCNC: NEGATIVE MG/DL
ALP SERPL-CCNC: 64 U/L (ref 40–129)
ALT SERPL W P-5'-P-CCNC: 31 U/L (ref 0–70)
ANION GAP SERPL CALCULATED.3IONS-SCNC: 12 MMOL/L (ref 7–15)
APPEARANCE UR: CLEAR
AST SERPL W P-5'-P-CCNC: 29 U/L (ref 0–45)
BACTERIA #/AREA URNS HPF: ABNORMAL /HPF
BASOPHILS # BLD AUTO: 0 10E3/UL (ref 0–0.2)
BASOPHILS NFR BLD AUTO: 1 %
BILIRUB SERPL-MCNC: 0.6 MG/DL
BILIRUB UR QL STRIP: NEGATIVE
BUN SERPL-MCNC: 22.4 MG/DL (ref 8–23)
CALCIUM SERPL-MCNC: 10.8 MG/DL (ref 8.8–10.2)
CHLORIDE SERPL-SCNC: 99 MMOL/L (ref 98–107)
CHOLEST SERPL-MCNC: 226 MG/DL
COLOR UR AUTO: YELLOW
CREAT SERPL-MCNC: 1.26 MG/DL (ref 0.67–1.17)
DEPRECATED HCO3 PLAS-SCNC: 27 MMOL/L (ref 22–29)
EOSINOPHIL # BLD AUTO: 0.3 10E3/UL (ref 0–0.7)
EOSINOPHIL NFR BLD AUTO: 3 %
ERYTHROCYTE [DISTWIDTH] IN BLOOD BY AUTOMATED COUNT: 12.8 % (ref 10–15)
GFR SERPL CREATININE-BSD FRML MDRD: 64 ML/MIN/1.73M2
GLUCOSE SERPL-MCNC: 100 MG/DL (ref 70–99)
GLUCOSE UR STRIP-MCNC: NEGATIVE MG/DL
HCT VFR BLD AUTO: 49.8 % (ref 40–53)
HDLC SERPL-MCNC: 58 MG/DL
HGB BLD-MCNC: 18.3 G/DL (ref 13.3–17.7)
HGB UR QL STRIP: ABNORMAL
IMM GRANULOCYTES # BLD: 0 10E3/UL
IMM GRANULOCYTES NFR BLD: 0 %
KETONES UR STRIP-MCNC: NEGATIVE MG/DL
LDLC SERPL CALC-MCNC: 146 MG/DL
LEUKOCYTE ESTERASE UR QL STRIP: NEGATIVE
LYMPHOCYTES # BLD AUTO: 2.1 10E3/UL (ref 0.8–5.3)
LYMPHOCYTES NFR BLD AUTO: 23 %
MCH RBC QN AUTO: 31.6 PG (ref 26.5–33)
MCHC RBC AUTO-ENTMCNC: 36.7 G/DL (ref 31.5–36.5)
MCV RBC AUTO: 86 FL (ref 78–100)
MONOCYTES # BLD AUTO: 0.8 10E3/UL (ref 0–1.3)
MONOCYTES NFR BLD AUTO: 9 %
NEUTROPHILS # BLD AUTO: 5.7 10E3/UL (ref 1.6–8.3)
NEUTROPHILS NFR BLD AUTO: 64 %
NITRATE UR QL: NEGATIVE
NONHDLC SERPL-MCNC: 168 MG/DL
PH UR STRIP: 7 [PH] (ref 5–7)
PLATELET # BLD AUTO: 216 10E3/UL (ref 150–450)
POTASSIUM SERPL-SCNC: 4 MMOL/L (ref 3.4–5.3)
PROT SERPL-MCNC: 7.8 G/DL (ref 6.4–8.3)
PSA SERPL DL<=0.01 NG/ML-MCNC: 1.59 NG/ML (ref 0–4.5)
RBC # BLD AUTO: 5.8 10E6/UL (ref 4.4–5.9)
RBC #/AREA URNS AUTO: ABNORMAL /HPF
SODIUM SERPL-SCNC: 138 MMOL/L (ref 136–145)
SP GR UR STRIP: 1.02 (ref 1–1.03)
SQUAMOUS #/AREA URNS AUTO: ABNORMAL /LPF
TRIGL SERPL-MCNC: 112 MG/DL
TSH SERPL DL<=0.005 MIU/L-ACNC: 3.71 UIU/ML (ref 0.3–4.2)
UROBILINOGEN UR STRIP-ACNC: 0.2 E.U./DL
WBC # BLD AUTO: 8.9 10E3/UL (ref 4–11)
WBC #/AREA URNS AUTO: ABNORMAL /HPF

## 2023-08-18 PROCEDURE — 84443 ASSAY THYROID STIM HORMONE: CPT | Performed by: INTERNAL MEDICINE

## 2023-08-18 PROCEDURE — 81001 URINALYSIS AUTO W/SCOPE: CPT | Performed by: INTERNAL MEDICINE

## 2023-08-18 PROCEDURE — 36415 COLL VENOUS BLD VENIPUNCTURE: CPT | Performed by: INTERNAL MEDICINE

## 2023-08-18 PROCEDURE — 99396 PREV VISIT EST AGE 40-64: CPT | Performed by: INTERNAL MEDICINE

## 2023-08-18 PROCEDURE — G0103 PSA SCREENING: HCPCS | Performed by: INTERNAL MEDICINE

## 2023-08-18 PROCEDURE — 80061 LIPID PANEL: CPT | Performed by: INTERNAL MEDICINE

## 2023-08-18 PROCEDURE — 80053 COMPREHEN METABOLIC PANEL: CPT | Performed by: INTERNAL MEDICINE

## 2023-08-18 PROCEDURE — 85025 COMPLETE CBC W/AUTO DIFF WBC: CPT | Performed by: INTERNAL MEDICINE

## 2023-08-18 PROCEDURE — 99214 OFFICE O/P EST MOD 30 MIN: CPT | Mod: 25 | Performed by: INTERNAL MEDICINE

## 2023-08-18 ASSESSMENT — ENCOUNTER SYMPTOMS
NERVOUS/ANXIOUS: 0
ARTHRALGIAS: 1
DIZZINESS: 0
HEADACHES: 0
ABDOMINAL PAIN: 0
DIARRHEA: 0
CONSTIPATION: 0
JOINT SWELLING: 0
WEAKNESS: 0
FREQUENCY: 0
HEARTBURN: 0
SORE THROAT: 0
FEVER: 0
MYALGIAS: 0
HEMATURIA: 0
DYSURIA: 0
COUGH: 0
SHORTNESS OF BREATH: 0
HEMATOCHEZIA: 0
EYE PAIN: 0
PARESTHESIAS: 0
CHILLS: 0
PALPITATIONS: 0
NAUSEA: 0

## 2023-08-18 NOTE — PROGRESS NOTES
SUBJECTIVE:   CC: Andrew is an 63 year old who presents for preventative health visit.       Patient is a 63-year-old  male who presents to the clinic for his annual physical.  He has no major concerns or issues today.  Patient has a history of hypertension, and he has been taking lisinopril 10 mg daily, atenolol 50 mg daily, and Maxzide 37.5/25 mg tablets daily for management of his blood pressure.  He has also been taking levothyroxine 150 mcg daily for management of his hypothyroidism.  His last TSH was noted to be 2.17 in September 2022.  Patient is tolerating all of his medications without issue.  He reports a stable appetite.  He is stooling and voiding without issue.  Patient does use a CPAP machine for sleeping at night.  He is fasting for lab work today.  Patient is due for a follow-up colonoscopy in September 2023, and it has been scheduled with his gastroenterologist.  Patient did express a desire to see if he can come off of one of his blood pressure medications.  He reports that he has been on his medications for several years, and his blood pressure has been under good control for extended period time.  His blood pressure today is noted to be 125/76.    Healthy Habits:     Getting at least 3 servings of Calcium per day:  NO    Bi-annual eye exam:  Yes    Dental care twice a year:  Yes    Sleep apnea or symptoms of sleep apnea:  Sleep apnea    Diet:  Regular (no restrictions)    Frequency of exercise:  6-7 days/week    Duration of exercise:  N/A    Taking medications regularly:  Yes    Medication side effects:  None    Additional concerns today:  Yes      Today's PHQ-9 Score:       8/17/2023     7:54 PM   PHQ-9 SCORE   PHQ-9 Total Score MyChart 1 (Minimal depression)   PHQ-9 Total Score 1       Hypertension Follow-up    Do you check your blood pressure regularly outside of the clinic? Yes   Are you following a low salt diet? Yes  Are your blood pressures ever more than 140 on the top number  "(systolic) OR more   than 90 on the bottom number (diastolic), for example 140/90? No      Social History     Tobacco Use    Smoking status: Never    Smokeless tobacco: Never   Substance Use Topics    Alcohol use: Not Currently     Comment: extremely rare             8/11/2023     9:37 AM   Alcohol Use   Prescreen: >3 drinks/day or >7 drinks/week? Not Applicable       Last PSA:   PSA   Date Value Ref Range Status   06/04/2021 1.21 0 - 4 ug/L Final     Comment:     Assay Method:  Chemiluminescence using Siemens Vista analyzer       Reviewed orders with patient. Reviewed health maintenance and updated orders accordingly - Yes  Lab work is in process    Reviewed and updated as needed this visit by clinical staff   Tobacco  Allergies  Meds   Med Hx            Reviewed and updated as needed this visit by Provider                     Review of Systems   Constitutional:  Negative for chills and fever.   HENT:  Negative for congestion, ear pain, hearing loss and sore throat.    Eyes:  Positive for visual disturbance. Negative for pain.   Respiratory:  Negative for cough and shortness of breath.    Cardiovascular:  Negative for chest pain, palpitations and peripheral edema.   Gastrointestinal:  Negative for abdominal pain, constipation, diarrhea, heartburn, hematochezia and nausea.   Genitourinary:  Positive for impotence. Negative for dysuria, frequency, genital sores, hematuria, penile discharge and urgency.   Musculoskeletal:  Positive for arthralgias. Negative for joint swelling and myalgias.   Skin:  Negative for rash.   Neurological:  Negative for dizziness, weakness, headaches and paresthesias.   Psychiatric/Behavioral:  Negative for mood changes. The patient is not nervous/anxious.        OBJECTIVE:   /76   Pulse 51   Temp 97.7  F (36.5  C)   Resp 18   Ht 1.727 m (5' 8\")   Wt 97.5 kg (215 lb)   SpO2 98%   BMI 32.69 kg/m      Physical Exam  Vitals reviewed.   HENT:      Head: Normocephalic and " atraumatic.      Right Ear: Tympanic membrane, ear canal and external ear normal.      Left Ear: Tympanic membrane, ear canal and external ear normal.      Mouth/Throat:      Mouth: Mucous membranes are moist.      Pharynx: Oropharynx is clear.   Eyes:      Extraocular Movements: Extraocular movements intact.      Conjunctiva/sclera: Conjunctivae normal.      Pupils: Pupils are equal, round, and reactive to light.   Cardiovascular:      Rate and Rhythm: Normal rate and regular rhythm.      Pulses: Normal pulses.      Heart sounds: Normal heart sounds.   Pulmonary:      Effort: Pulmonary effort is normal.      Breath sounds: Normal breath sounds.   Abdominal:      General: Bowel sounds are normal.      Palpations: Abdomen is soft.   Musculoskeletal:         General: Normal range of motion.      Cervical back: Normal range of motion and neck supple.   Skin:     General: Skin is warm.      Capillary Refill: Capillary refill takes less than 2 seconds.   Neurological:      General: No focal deficit present.      Mental Status: He is alert and oriented to person, place, and time.       Labs: CMP, CBC, FLP, TSH, PSA, and urinalysis are pending.      ASSESSMENT/PLAN:   (Z00.00) Annual physical exam  (primary encounter diagnosis)  Comment: At this time, patient does have an unremarkable physical examination.  His blood pressure is noted to be at an acceptable level.  We did spend some time discussing appropriate dietary and lifestyle modifications to help keep his weight and blood pressure under good control.  Fasting labs are pending.  All health maintenance items were addressed.    (N18.30) Stage 3 chronic kidney disease, unspecified whether stage 3a or 3b CKD (H)  Comment: Chronic condition.  We will monitor with routine lab work.    (E66.01) Morbid obesity (H)  Comment: BMI of 32.7 with a history of hypertension.  Weight loss encouraged.    (E78.5) Hyperlipidemia LDL goal <130  Comment: At this time, patient has been  "managing his cholesterol with dietary modifications.  He does have a repeat lipid panel that is currently pending.  Once results of his labs are available for review further recommendations in regards to ongoing cholesterol management was made.    (E03.9) Acquired hypothyroidism  Comment: Patient also has a repeat TSH that is currently pending.  Following results of his labs, he will continue his levothyroxine 150 mcg daily.  Further recommendations in regards to his levothyroxine dose of 80 once his lab results are available for review.    (I10) Benign essential hypertension  Comment: At this time, patient's blood pressure is currently under good control.  He did express a desire to try to titrate off of some of his medications.  For simplicity sake, I did recommend that he could consider trying holding his low-dose of lisinopril and monitor his blood pressure closely to see how he responds to this change in his medication regimen.  Otherwise, he will continue his Maxzide 37.5/25 mg tablets daily along with 50 mg of atenolol for management of his blood pressure.  Side effects of each medication were reviewed.    (Z12.5) Prostate cancer screening  Comment: PSA, screen    Patient has been advised of split billing requirements and indicates understanding: Yes      COUNSELING:   Reviewed preventive health counseling, as reflected in patient instructions      BMI:   Estimated body mass index is 32.69 kg/m  as calculated from the following:    Height as of this encounter: 1.727 m (5' 8\").    Weight as of this encounter: 97.5 kg (215 lb).   Weight management plan: Discussed healthy diet and exercise guidelines      He reports that he has never smoked. He has never used smokeless tobacco.          Audi Rdz MD  Children's MinnesotaAnswers submitted by the patient for this visit:  Patient Health Questionnaire (Submitted on 8/17/2023)  If you checked off any problems, how difficult have these " problems made it for you to do your work, take care of things at home, or get along with other people?: Not difficult at all  PHQ9 TOTAL SCORE: 1

## 2023-08-29 ENCOUNTER — OFFICE VISIT (OUTPATIENT)
Dept: PODIATRY | Facility: CLINIC | Age: 63
End: 2023-08-29
Payer: COMMERCIAL

## 2023-08-29 VITALS
BODY MASS INDEX: 33.43 KG/M2 | HEART RATE: 69 BPM | HEIGHT: 67 IN | WEIGHT: 213 LBS | SYSTOLIC BLOOD PRESSURE: 122 MMHG | DIASTOLIC BLOOD PRESSURE: 82 MMHG

## 2023-08-29 DIAGNOSIS — M19.072 ARTHRITIS OF LEFT FOOT: Primary | ICD-10-CM

## 2023-08-29 DIAGNOSIS — M72.2 PLANTAR FASCIITIS, LEFT: ICD-10-CM

## 2023-08-29 PROCEDURE — 99213 OFFICE O/P EST LOW 20 MIN: CPT | Performed by: PODIATRIST

## 2023-08-29 NOTE — LETTER
"    8/29/2023         RE: Grant Shoemaker  [address] [address]        Dear Colleague,    Thank you for referring your patient, Grant Shoemaker, to the [facility]. Please see a copy of my visit note below.    Foot & Ankle Surgery  August 29, 2023    CC: \"Possible Planter fasciitis\"    I was asked to see Grant Shoemaker regarding the chief complaint by: Self    HPI:  Pt is a 63 year old male who presents with above complaint.  1 to 2-week history of left lower extremity pain.  Describes throbbing, \"tender, sharp, exasperated by walking/putting pressure on my foot.\"  Pain 8 out of 10 \"when walking\", worse with \"walking\".  \"Nothing yet\" for treatment.  Describes pain in the \"arch area\", circumferentially around the midfoot joint complex.  No trauma or inciting event.  Had plantar fasciitis 20 years ago, \"very similar\" to old symptoms  \"Less.  Less painful in the morning, worse with increased time on feet.    ROS:   Pos for CC.  The patient denies current nausea, vomiting, chills, fevers, belly pain, calf pain, chest pain or SOB.  Complete remainder of ROS is otherwise neg.    VITALS:    Vitals:    08/29/23 0909   BP: 122/82   Pulse: 69   Weight: 96.6 kg (213 lb)   Height: 1.702 m (5' 7\")       PMH:    Past Medical History:   Diagnosis Date     Adenomatous polyp of colon      Hyperlipidemia      Hypertension      Hypothyroidism      ANN (obstructive sleep apnea)        SXHX:    Past Surgical History:   Procedure Laterality Date     BACK SURGERY  1999     COLONOSCOPY  4/2018     RELEASE CARPAL TUNNEL       RELEASE TRIGGER FINGER      x6     ZZC CERV ARTIFIC DISKECTOMY       ZZC LAMINOTOMY, SINGLE CERVICAL          MEDS:    Current Outpatient Medications   Medication     ASPIRIN ADULT LOW STRENGTH 81 MG EC tablet     atenolol (TENORMIN) 50 MG tablet     calcium carbonate 750 MG CHEW     Cholecalciferol (VITAMIN D3) 50 MCG (2000 UT) CAPS     levothyroxine " (SYNTHROID/LEVOTHROID) 150 MCG tablet     triamterene-HCTZ (MAXZIDE-25) 37.5-25 MG tablet     lisinopril (ZESTRIL) 10 MG tablet     No current facility-administered medications for this visit.       ALL:     Allergies   Allergen Reactions     Contrast Dye      PN: LW CM1: CONTRAST- nka Reaction :     Food      PN: LW FI1: nka LW FI2:       FMH:    Family History   Problem Relation Age of Onset     Pancreatic Cancer Mother      Other Cancer Mother         Pancreas     Esophageal Cancer Father      Other Cancer Father         Brain, Esophagus     Thyroid Disease Sister      Breast Cancer Sister      Obesity Sister      Mental Illness Paternal Grandmother         bipolar       SocHx:    Social History     Socioeconomic History     Marital status:      Spouse name: Not on file     Number of children: Not on file     Years of education: Not on file     Highest education level: Not on file   Occupational History     Not on file   Tobacco Use     Smoking status: Never     Smokeless tobacco: Never   Vaping Use     Vaping Use: Never used   Substance and Sexual Activity     Alcohol use: Not Currently     Comment: extremely rare     Drug use: Never     Sexual activity: Not Currently     Birth control/protection: Abstinence   Other Topics Concern     Parent/sibling w/ CABG, MI or angioplasty before 65F 55M? No   Social History Narrative     Not on file     Social Determinants of Health     Financial Resource Strain: Not on file   Food Insecurity: Not on file   Transportation Needs: Not on file   Physical Activity: Not on file   Stress: Not on file   Social Connections: Not on file   Intimate Partner Violence: Not on file   Housing Stability: Not on file           EXAMINATION:  Gen:   No apparent distress  Neuro:   A&Ox3, no deficits  Psych:    Answering questions appropriately for age and situation with normal affect  Head:    NCAT  Eye:    Visual scanning without deficit  Ear:    Response to auditory stimuli wnl  Lung:     Non-labored breathing on RA noted  Abd:    NTND per patient report  Lymph:    Neg for pitting/non-pitting edema BLE  Vasc:    Pulses palpable, CFT minimally delayed  Neuro:    Light touch sensation intact to all sensory nerve distributions without paresthesias  Derm:    Neg for nodules, lesions or ulcerations  MSK:    Left lower extremity  - pain on palpation first greater than second tarsometatarsal joint.  Mild pain on palpation along the central band of the plantar fascia at the midfoot  Calf:    Neg for redness, swelling or tenderness      Assessment:  63 year old male with left midfoot arthritis including first and second tarsometatarsal joints; left plantar fasciitis      Medical Decision Making/Plan:  Discussed etiologies, anatomy and options  1.  Left midfoot arthritis including first and second tarsometatarsal joints  -I personally reviewed and interpreted the patient's lower extremity history pertinent to today's visit, including imaging/labs, in preparation for initiating a treatment program.  -Our arthritis handout was dispensed  -Comfortable shoes and minimize shoeless walking based on pain; consider lacing modifications to minimize dorsal pressure  -OTC insert for arch support and stress relief on midfoot joint complex  -RICE/NSAID versus Tylenol as needed based on pain  -Consider image guided steroid injections    2.  Left plantar fasciitis  -Regarding the heel pain, the Plantar Fasciitis handout was dispensed and discussed.  We talked about stretching, resting/activity modification, icing, NSAID/tylenol use as tolerated, inserts, supportive/comfortable shoes and minimizing shoeless walking.    -discussed Achilles, plantar fascial and hamstring stretches  -OTC insert information dispensed and discussed      Follow up: 2 weeks or sooner with acute issues      Patient's medical history was reviewed today      Carlos A Mcknight DPM FACFAS FACFAOM  Podiatric Foot & Ankle Surgeon  [facility]  [phone]    Disclaimer: This note consists of symbols derived from keyboarding, dictation and/or voice recognition software. As a result, there may be errors in the script that have gone undetected. Please consider this when interpreting information found in this chart.          Again, thank you for allowing me to participate in the care of your patient.        Sincerely,        Carlos A Mcknight DPM, MORA

## 2023-08-29 NOTE — PROGRESS NOTES
"Foot & Ankle Surgery  August 29, 2023    CC: \"Possible Planter fasciitis\"    I was asked to see Grant Shoemaker regarding the chief complaint by: Self    HPI:  Pt is a 63 year old male who presents with above complaint.  1 to 2-week history of left lower extremity pain.  Describes throbbing, \"tender, sharp, exasperated by walking/putting pressure on my foot.\"  Pain 8 out of 10 \"when walking\", worse with \"walking\".  \"Nothing yet\" for treatment.  Describes pain in the \"arch area\", circumferentially around the midfoot joint complex.  No trauma or inciting event.  Had plantar fasciitis 20 years ago, \"very similar\" to old symptoms  \"Less.  Less painful in the morning, worse with increased time on feet.    ROS:   Pos for CC.  The patient denies current nausea, vomiting, chills, fevers, belly pain, calf pain, chest pain or SOB.  Complete remainder of ROS is otherwise neg.    VITALS:    Vitals:    08/29/23 0909   BP: 122/82   Pulse: 69   Weight: 96.6 kg (213 lb)   Height: 1.702 m (5' 7\")       PMH:    Past Medical History:   Diagnosis Date    Adenomatous polyp of colon     Hyperlipidemia     Hypertension     Hypothyroidism     ANN (obstructive sleep apnea)        SXHX:    Past Surgical History:   Procedure Laterality Date    BACK SURGERY  1999    COLONOSCOPY  4/2018    RELEASE CARPAL TUNNEL      RELEASE TRIGGER FINGER      x6    ZZC CERV ARTIFIC DISKECTOMY      ZZC LAMINOTOMY, SINGLE CERVICAL          MEDS:    Current Outpatient Medications   Medication    ASPIRIN ADULT LOW STRENGTH 81 MG EC tablet    atenolol (TENORMIN) 50 MG tablet    calcium carbonate 750 MG CHEW    Cholecalciferol (VITAMIN D3) 50 MCG (2000 UT) CAPS    levothyroxine (SYNTHROID/LEVOTHROID) 150 MCG tablet    triamterene-HCTZ (MAXZIDE-25) 37.5-25 MG tablet    lisinopril (ZESTRIL) 10 MG tablet     No current facility-administered medications for this visit.       ALL:     Allergies   Allergen Reactions    Contrast Dye      PN: LW CM1: CONTRAST- nka Reaction " :    Food      PN: LW FI1: nka LW FI2:       FMH:    Family History   Problem Relation Age of Onset    Pancreatic Cancer Mother     Other Cancer Mother         Pancreas    Esophageal Cancer Father     Other Cancer Father         Brain, Esophagus    Thyroid Disease Sister     Breast Cancer Sister     Obesity Sister     Mental Illness Paternal Grandmother         bipolar       SocHx:    Social History     Socioeconomic History    Marital status:      Spouse name: Not on file    Number of children: Not on file    Years of education: Not on file    Highest education level: Not on file   Occupational History    Not on file   Tobacco Use    Smoking status: Never    Smokeless tobacco: Never   Vaping Use    Vaping Use: Never used   Substance and Sexual Activity    Alcohol use: Not Currently     Comment: extremely rare    Drug use: Never    Sexual activity: Not Currently     Birth control/protection: Abstinence   Other Topics Concern    Parent/sibling w/ CABG, MI or angioplasty before 65F 55M? No   Social History Narrative    Not on file     Social Determinants of Health     Financial Resource Strain: Not on file   Food Insecurity: Not on file   Transportation Needs: Not on file   Physical Activity: Not on file   Stress: Not on file   Social Connections: Not on file   Intimate Partner Violence: Not on file   Housing Stability: Not on file           EXAMINATION:  Gen:   No apparent distress  Neuro:   A&Ox3, no deficits  Psych:    Answering questions appropriately for age and situation with normal affect  Head:    NCAT  Eye:    Visual scanning without deficit  Ear:    Response to auditory stimuli wnl  Lung:    Non-labored breathing on RA noted  Abd:    NTND per patient report  Lymph:    Neg for pitting/non-pitting edema BLE  Vasc:    Pulses palpable, CFT minimally delayed  Neuro:    Light touch sensation intact to all sensory nerve distributions without paresthesias  Derm:    Neg for nodules, lesions or  ulcerations  MSK:    Left lower extremity  - pain on palpation first greater than second tarsometatarsal joint.  Mild pain on palpation along the central band of the plantar fascia at the midfoot  Calf:    Neg for redness, swelling or tenderness      Assessment:  63 year old male with left midfoot arthritis including first and second tarsometatarsal joints; left plantar fasciitis      Medical Decision Making/Plan:  Discussed etiologies, anatomy and options  1.  Left midfoot arthritis including first and second tarsometatarsal joints  -I personally reviewed and interpreted the patient's lower extremity history pertinent to today's visit, including imaging/labs, in preparation for initiating a treatment program.  -Our arthritis handout was dispensed  -Comfortable shoes and minimize shoeless walking based on pain; consider lacing modifications to minimize dorsal pressure  -OTC insert for arch support and stress relief on midfoot joint complex  -RICE/NSAID versus Tylenol as needed based on pain  -Consider image guided steroid injections    2.  Left plantar fasciitis  -Regarding the heel pain, the Plantar Fasciitis handout was dispensed and discussed.  We talked about stretching, resting/activity modification, icing, NSAID/tylenol use as tolerated, inserts, supportive/comfortable shoes and minimizing shoeless walking.    -discussed Achilles, plantar fascial and hamstring stretches  -OTC insert information dispensed and discussed      Follow up: 2 weeks or sooner with acute issues      Patient's medical history was reviewed today      Carlos A Mcknight DPM FACFAS FACFAOM  Podiatric Foot & Ankle Surgeon  [facility]  [phone]    Disclaimer: This note consists of symbols derived from keyboarding, dictation and/or voice recognition software. As a result, there may be errors in the script that have gone undetected. Please consider this when interpreting information found in this chart.

## 2023-08-29 NOTE — PATIENT INSTRUCTIONS
Thank you for choosing [facility] / Foot & Ankle Surgery!    DR. GUILLORY'S CLINIC LOCATIONS:     [facility] (Friday) TRIAGE LINE: [phone] [address] [facility]  APPOINTMENTS: [phone]   [place] [address] RADIOLOGY: [phone]    PHYSICAL THERAPY: [phone]    SET UP SURGERY: [phone]   [place] (Mon-Tues AM-Thurs) BILLING QUESTIONS: [phone]   [address] [address] FAX: [phone]   [place] [address] [facility]: [phone]      PLANTAR FASCIITIS  Plantar fasciitis is often referred to as heel spurs or heel pain. Plantar fasciitis is a very common problem that affects people of all foot shapes, age, weight and activity level. Pain may be in the arch or on the weight-bearing surface of the heel. The pain may come on without injury or identifiable cause. Pain is generally present when first getting out of bed in the morning or up from a seated break.     CAUSES  The plantar fascia is a dense fibrous band of tissue that stretches across the bottom surface of the foot. The fascia helps support the foot muscles and arch. Plantar fasciitis is thought to be caused by mechanical strain or overload. Frequent walking without shoes or wearing unsupportive shoes is thought to cause structural overload and ultimately inflammation of the plantar fascia. Some people have heel spurs that can be seen on x-ray. The heel spur is actually a minor component of plantar fascitis and is largely ignored.       SELF TREATMENT   The easiest solution is to stop walking around your home without shoes. Plantar fasciitis is largely a shoe problem. Shoes are either not being worn often enough or your current shoes are inadequate for your weight, foot structure or activity level. The majority of shoes on the market today are not sufficient to resist development of plantar fasciitis or to promote healing. Assume that your current shoes are inadequate and will need to be replaced.  Even high quality shoes wear out with 6 months to one year of frequent use. Weight loss is another option. Losing ten pounds in the next two months may be enough to resolve the problem. Ice applied to the area of pain two to three times per day for ten minutes each session can be very helpful. Warm foot soaks in epsom salts can also relieve pain. This should continue until the problem resolves. Achilles tendon stretching is essential. Stretch multiple times daily to promote healing and to prevent recurrence in the future. Over all stretching of the body is helpful as well such as the calves, thighs and lower back. Normally when one area of the body is tight, other areas are too. Gentle Yoga can be good for this.     Over the counter topical anti inflammatories can be helpful such as biofreeze, bengay, salon pas, ect...  Oral ibuprofen or aleve is recommended as well to try to calm down inflammation.     Night splints can be helpful to gradually stretch the foot at night as a lot of pain is when you get up in the morning. Taking a towel or thera band and stretching the foot back multiple times before you get ou of bed can be beneficial as well.     MEDICAL TREATMENT  Medical treatments often include custom arch supports, cortisone injections, physical therapy, splints to be worn in bed, prescription medications and surgery. The home treatments listed above will be necessary regardless of these advanced medical treatments. Surgery is rarely needed but is very helpful in selected cases.     PROGNOSIS  Plantar fasciitis can last from one day to a lifetime. Some people get intermittent fascitis that is very short-lived. Others suffer daily for years. Excessive body weight, frequent bare foot walking, long hours on the feet, inadequate shoes, predisposing foot structures and excessive activity such as running are all potential issues that lead to chronic and/or recurring plantar fascitis. Having plantar fasciitis means  that you are forever prone to this problem and will require modification of some of the above factors. Most people seek treatment within one to four months. Healing usually requires a similar one to four month time frame. Healing time is relative to the amount of effort spent treating the problem.   Plantar fasciitis is highly recurrent. Risk factors often continue, including return to bare foot walking, inadequate shoes, excessive body weight, excessive activities, etc. Your life style and foot structure may predispose you to recurrent plantar fasciitis. A daily prevention regimen can be very helpful. Ongoing use of shoe inserts, careful attention to appropriate shoes, daily Achilles stretching, etc. may prevent recurrence. Prompt attention at the earliest warning signs of heel pain can resolve the problem in as short as a few days.     EXERCISES  Stair Exercise: Step on the stairs with the ball of your foot and hold your position for at least 15 seconds, then slowly step down with the heels of your foot. You can do this daily and as often as you want.   Picking the Towel: Sit comfortably and then pick the towel up with your toes. You can use any object other than a towel as long as the material can be soft and you can pick it up with your toes.  Rolling the Bottle: Use a small ball or frozen water bottle and then roll it around with your foot.   Flex the Toes: Sit comfortably and then flex your toes by pointing it towards the floor or towards your body. This will relax and flex your foot and exercise your plantar fascia, the calf, and the Achilles tendon. The inability of the foot to stretch often causes the bunching up of the plantar fascia area leading to the pain.  Calf/Achilles Stretching: Lay on you back and raise one foot, then point your toes towards the floor. See photo below:               Hold each stretch for 10 seconds. Stretch 10 times per set, three sets per day. Morning, afternoon and evening. If  your heel pain is very severe in the morning, consider doing the first set of stretches before you get out of bed.      OVER THE COUNTER INSERT RECOMMENDATIONS  SuperFeet   Sofsole Fit Spenco   Power Step   Walk-Fit Arch Cradles     Most of these can be found at your local [employer], or online.  **A good high quality over the counter insert should cost around $40-$50      [facility] LOCATIONS  [place]  [address] [place] [address]  [phone]   [address]  [phone] [place]  [address] [place] [address]  [phone]   [place]  [address] [place] [address] [address]  [phone]   [place]  [address]  [phone] [place] [address]  [phone]   [place]  [address]  [phone]   [place]  [address]  [phone] [place]  [address]  [phone]

## 2023-09-27 ENCOUNTER — MYC MEDICAL ADVICE (OUTPATIENT)
Dept: INTERNAL MEDICINE | Facility: CLINIC | Age: 63
End: 2023-09-27

## 2023-09-27 DIAGNOSIS — I10 BENIGN ESSENTIAL HYPERTENSION: ICD-10-CM

## 2023-10-05 ENCOUNTER — TRANSFERRED RECORDS (OUTPATIENT)
Dept: HEALTH INFORMATION MANAGEMENT | Facility: CLINIC | Age: 63
End: 2023-10-05
Payer: COMMERCIAL

## 2023-10-23 ENCOUNTER — NURSE TRIAGE (OUTPATIENT)
Dept: INTERNAL MEDICINE | Facility: CLINIC | Age: 63
End: 2023-10-23

## 2023-10-23 ENCOUNTER — OFFICE VISIT (OUTPATIENT)
Dept: INTERNAL MEDICINE | Facility: CLINIC | Age: 63
End: 2023-10-23
Payer: COMMERCIAL

## 2023-10-23 VITALS
HEART RATE: 55 BPM | DIASTOLIC BLOOD PRESSURE: 76 MMHG | BODY MASS INDEX: 33.34 KG/M2 | TEMPERATURE: 98.8 F | HEIGHT: 67 IN | SYSTOLIC BLOOD PRESSURE: 118 MMHG | RESPIRATION RATE: 12 BRPM | WEIGHT: 212.4 LBS | OXYGEN SATURATION: 100 %

## 2023-10-23 DIAGNOSIS — M25.521 RIGHT ELBOW PAIN: Primary | ICD-10-CM

## 2023-10-23 PROCEDURE — 99213 OFFICE O/P EST LOW 20 MIN: CPT | Performed by: PHYSICIAN ASSISTANT

## 2023-10-23 ASSESSMENT — PAIN SCALES - GENERAL: PAINLEVEL: SEVERE PAIN (6)

## 2023-10-23 NOTE — COMMUNITY RESOURCES LIST (ENGLISH)
10/23/2023   [facility]  N/A  For questions about this resource list or additional care needs, please contact your primary care clinic or care manager.  Phone: [phone]   Email: N/A   Address: [address] [place] [address]   Hours: N/A        Food and Nutrition       Food pantry  1  [place] Distance: 0.67 miles      In-Person   [address] [place] [address]  Language: English  Hours: Tue 10:00 AM - 12:00 PM  Fees: Free   Phone: (619) 861-6762 Ext.234 Website: http://www.ot.org     2  [employer] Distance: 0.83 miles      Pickup   [address] [place] [address]  Language: English  Hours: Mon - Sat 10:30 AM - 4:30 PM  Fees: Free   Phone: (965) 163-1555 Email: daniel@[employer] Website: http://www.[employer]/     SNAP application assistance  3  [address] [place] [address] Distance: 5.38 miles      In-Person   [address] [place] [address]  Language: English  Hours: Mon 8:00 AM - 4:00 PM , Tue 8:00 AM - 7:00 PM , Wed - Thu 8:00 AM - 4:00 PM  Fees: Free   Phone: (362) 743-9350 Email: info@[address] [place] [address].org Website: https://[address] [place] [address].org/resources/resource-centers/     4  [employer] [facility]  [facility] Distance: 6.53 miles      In-Person   [address] [place] [address]  Language: English, Burundian  Hours: Mon - Fri 8:00 AM - 8:00 PM  Fees: Free   Phone: (342) 260-5546 Email: info@[facility].org Website: http://www.[facility] Center[facility].org     Soup kitchen or free meals  5  [place] [facility] [place] Distance: 3.34 miles      Pickup   [address] [place] [address]  Language: English, Burundian  Hours: Mon - Thu 5:30 PM - 6:30 PM  Fees: Free   Phone: (244) 918-5144 Email: sean@[employer] Website: http://[employer].org/wordpress/?page_id=5168     6  [facility] [place]  Distance: 5.58 miles      Pickup   [address] [place] [address]  Language: English  Hours: Mon - Fri 5:00 PM - 6:00 PM  Fees: Free   Phone: (573) 161-7100 Email: contactus@[employer] Website: https://www.h[employer]MYway.com.org/          Important Numbers & Websites       Emergency Services   911  [facility] Services   311  Poison Control   (817) 872-6987  Suicide Prevention Lifeline   (874) 741-5752 (TALK)  Child Abuse Hotline   (133) 507-9743 (4-A-Child)  Sexual Assault Hotline   (899) 296-9917 (HOPE)  National Runaway Safeline   (419) 837-8141 (RUNAWAY)  All-Options Talkline   (301) 943-8675  Substance Abuse Referral   (301) 621-7523 (HELP)

## 2023-10-23 NOTE — PROGRESS NOTES
"  Assessment & Plan     Right elbow pain  Suspect distal biceps tendinopathy. I do not suspect complete rupture. Recommend rest, ice, OTC pain relievers. Sling for comfort, but discussed doing gentle ROM a couple of times per day to prevent stiffness. Follow-up with ortho.   Able to delegate lifting tasks at work.  - Orthopedic  Referral; Future  - Wrist/Arm Supplies Order Sling; Right      CRISTINO Flannery [facility] PAYAL Morales is a 63 year old, presenting for the following health issues:  Pain (He has pain in his right arm. It is more painful when he moves his arm. It started yesterday.)      10/23/2023     9:33 AM   Additional Questions   Roomed by Nenita Cowan MA   Accompanied by Himself       Pain    History of Present Illness       Reason for visit:  Arm pain  Symptom onset:  1-3 days ago  Symptom intensity:  Moderate  Symptom progression:  Staying the same  Had these symptoms before:  No  What makes it worse:  Motion    He eats 0-1 servings of fruits and vegetables daily.He consumes 1 sweetened beverage(s) daily.He exercises with enough effort to increase his heart rate 9 or less minutes per day.  He exercises with enough effort to increase his heart rate 7 days per week.   He is taking medications regularly.     Does a lot of lifting at work  R hand dominant  No N/T  Doesn't recall hearing a pop or sustaining a single injury  Location of pain: R anterior elbow, just below antecubital fossa    No h/o previous injury to this area  H/o trigger fingers  Took Advil before bed last night  Trying to rest  Hasn't tried ice    Pain with movement of the elbow      Review of Systems   Constitutional, HEENT, cardiovascular, pulmonary, gi and gu systems are negative, except as otherwise noted.      Objective    /76 (BP Location: Left arm, Patient Position: Sitting, Cuff Size: Adult Large)   Pulse 55   Temp 98.8  F (37.1  C) (Oral)   Resp 12   Ht 1.702 m (5' 7\") "   Wt 96.3 kg (212 lb 6.4 oz)   SpO2 100%   BMI 33.27 kg/m    Body mass index is 33.27 kg/m .  Physical Exam   GENERAL: healthy, alert and no distress  MS: Normal ROM of shoulder and wrist. Pain in R elbow with elbow flexion and extension. Pain with pronation and supination. No palpable defects. Distal biceps tendon feels intact with palpation. No significant tenderness to palpation of anatomic structures of elbow  SKIN: no suspicious lesions or rashes  PSYCH: mentation appears normal, affect normal/bright

## 2023-10-23 NOTE — TELEPHONE ENCOUNTER
Nurse Triage SBAR    Is this a 2nd Level Triage? NO    Situation: Patient calls to see where he should be seen.    Background: Patient was doing a lot of moving and lifting with his job.    Assessment: Sharp pain (7/10) below elbow when he moves on right arm starting yesterday. Feels like he may have torn a ligament.  Patient denies brusing, open sores or edema.  Patient states limted ROM in arm.    Treatment: advil last night.     Protocol Recommended Disposition:   See in Office Today    Appointments in Next Year      Oct 23, 2023  9:30 AM  (Arrive by 9:10 AM)  Provider Visit with Twila Melissa PA-C  [facility] ([facility] ) [phone]            Recommendation: Recommend to be seen due to limited ROM. Appointment scheduled.    Does the patient meet one of the following criteria for ADS visit consideration? 16+ years old, with an [facility] PCP     TIP  Providers, please consider if this condition is appropriate for management at one of our Acute and Diagnostic Services sites.     If patient is a good candidate, please use dotphrase <dot>triageresponse and select Refer to ADS to document.  Reason for Disposition   SEVERE pain    Additional Information   Negative: Major bleeding (actively dripping or spurting) that can't be stopped   Negative: Serious injury with multiple fractures (broken bones)   Negative: Sounds like a life-threatening emergency to the triager   Negative: Wound looks infected   Negative: Arm pain from overuse (e.g., sports, lifting, physical work)   Negative: Arm pain not from an injury   Negative: Shoulder injury is main concern   Negative: Elbow injury is main concern   Negative: Hand or wrist injury is main concern   Negative: Bullet wound, stabbed by knife, or other serious penetrating wound   Negative: Looks like a broken bone or dislocated joint (crooked or deformed)   Negative: Can't move injured arm at all   Negative: Bleeding won't  "stop after 10 minutes of direct pressure (using correct technique)   Negative: Skin is split open or gaping (or length > 1/2 inch or 12 mm)   Negative: Dirt in the wound and not removed after 15 minutes of scrubbing   Negative: Sounds like a serious injury to the triager    Answer Assessment - Initial Assessment Questions  1. MECHANISM: \"How did the injury happen?\"      Possibly at work  2. ONSET: \"When did the injury happen?\" (Minutes or hours ago)       Yeserday  3. LOCATION: \"Where is the injury located?\" \"Which arm?\"      Right arm  4. APPEARANCE of INJURY: \"What does the injury look like?\"       Normal.  5. SEVERITY: \"Can you use the arm normally?\"       Hard to move it normally.   6. SWELLING or BRUISING: \"is there any swelling or bruising?\" If Yes, ask: \"How large is it? (e.g., inches, centimeters)       No.  7. PAIN: \"Is there pain?\" If Yes, ask: \"How bad is the pain?\"    (Scale 1-10; or mild, moderate, severe)    - NONE (0): No pain.    - MILD (1-3): Doesn't interfere with normal activities.    - MODERATE (4-7): Interferes with normal activities (e.g., work or school) or awakens from sleep.    - SEVERE (8-10): Excruciating pain, unable to do any normal activities, unable to hold a cup of water.      7  8. TETANUS: For any breaks in the skin, ask: \"When was the last tetanus booster?\"      No.  9. OTHER SYMPTOMS: \"Do you have any other symptoms?\"  (e.g., numbness in hand)      Limted ROM.  10. PREGNANCY: \"Is there any chance you are pregnant?\" \"When was your last menstrual period?\"        N/A    Protocols used: Arm Injury-A-OH    "

## 2023-10-25 ENCOUNTER — ANCILLARY PROCEDURE (OUTPATIENT)
Dept: GENERAL RADIOLOGY | Facility: CLINIC | Age: 63
End: 2023-10-25
Attending: STUDENT IN AN ORGANIZED HEALTH CARE EDUCATION/TRAINING PROGRAM
Payer: COMMERCIAL

## 2023-10-25 ENCOUNTER — OFFICE VISIT (OUTPATIENT)
Dept: ORTHOPEDICS | Facility: CLINIC | Age: 63
End: 2023-10-25
Attending: PHYSICIAN ASSISTANT
Payer: COMMERCIAL

## 2023-10-25 VITALS
SYSTOLIC BLOOD PRESSURE: 129 MMHG | WEIGHT: 212 LBS | HEIGHT: 67 IN | BODY MASS INDEX: 33.27 KG/M2 | DIASTOLIC BLOOD PRESSURE: 83 MMHG

## 2023-10-25 DIAGNOSIS — M25.521 RIGHT ELBOW PAIN: ICD-10-CM

## 2023-10-25 DIAGNOSIS — M75.21 BICEPS TENDONITIS, RIGHT: Primary | ICD-10-CM

## 2023-10-25 PROCEDURE — 99203 OFFICE O/P NEW LOW 30 MIN: CPT | Performed by: STUDENT IN AN ORGANIZED HEALTH CARE EDUCATION/TRAINING PROGRAM

## 2023-10-25 PROCEDURE — 73080 X-RAY EXAM OF ELBOW: CPT | Mod: TC | Performed by: RADIOLOGY

## 2023-10-25 NOTE — PATIENT INSTRUCTIONS
1. Biceps tendonitis, right    2. Right elbow pain      -No lifting moderate sized objects. Nothing greater than 10lbs for two weeks  -Naproxen 500mg twice a day for the next week then as needed for pain relief (over the counter at drug stores). Take with food.   -Follow-up in a month as needed    Please call [phone]  Ask for my team if you have any questions or concerns    Funmilayo Arshad DO  [facility]      Thank you for choosing [facility]!    CLINIC LOCATIONS:     [place]  TRIAGE LINE: [phone]   [address] [place] [address] APPOINTMENTS: [phone]   [place] [address] RADIOLOGY: [phone]   (Thursday & Friday) PHYSICAL THERAPY: [phone]    BILLING QUESTIONS: [phone]   [place] FAX: [phone]   [address]    [place] [address]    (Monday & Wednesday

## 2023-10-25 NOTE — LETTER
10/25/2023         RE: Grant Shoemaker  [address] [facility] [address]        Dear Colleague,    Thank you for referring your patient, Grant Shoemaker, to the [facility] SP[facility]IC[facility]. Please see a copy of my visit note below.    ASSESSMENT & PLAN    Andrew was seen today for pain.    Diagnoses and all orders for this visit:    Right elbow pain  -     Orthopedic  Referral      This issue is acute and Unchanged.        Palpable distal biceps insertion but with Mild weakness mainly due to pain  Negative raya and strength only mildly weak less likely acute rupture.   Strain versus tear at this time  Will rest and start physical therapy at this time. No heavy lifting for the next two weeks no liftin gmore than 10lbs until symptoms improve.   Naproxen 500mg BID schedule for one week with food then as needed for pain relief.   Follow-up one month as needed      Funmilayo Arshad DO  [facility]    -----  Chief Complaint   Patient presents with     Right Elbow - Pain       SUBJECTIVE  Grant Shoemaker is a/an 63 year old male who is seen in consultation at the request of  Twila Melissa PA-C for evaluation of right elbow pain.     The patient is seen by themselves.  The patient is Right handed    Onset: 3 day(s) ago. Reports insidious onset without acute precipitating event.  Location of Pain: right anterior elbow  Worsened by: Lifting hand above shoulders.   Better with: Rest  Treatments tried: rest/activity avoidance, casting/splinting/bracing, and Advil  Associated symptoms: no distal numbness or tingling; denies swelling or warmth    Orthopedic/Surgical history: NO  Social History/Occupation:        REVIEW OF SYSTEMS:  10 point ROS is negative other than symptoms noted above in HPI, Past Medical History or as stated below  Constitutional: NEGATIVE for fever, chills, change in weight  Skin: NEGATIVE for  "worrisome rashes, moles or lesions  GI/: NEGATIVE for bowel or bladder changes  Neuro: NEGATIVE for weakness, dizziness or paresthesias      OBJECTIVE:  /83   Ht 1.702 m (5' 7\")   Wt 96.2 kg (212 lb)   BMI 33.20 kg/m     General: healthy, alert and in no distress  Skin: no suspicious lesions or rash.  CV: distal perfusion intact bilateral UE  Resp: normal respiratory effort without conversational dyspnea   Psych: normal mood and affect  Gait: NORMAL  Neuro: Normal light sensory exam of upper extremity bilateral intact    RIGHT ELBOW  Inspection:    No swelling, bruising, discoloration, or obvious deformity or asymmetry  Palpation:    Tender about the lateral distal bicep tendon. Remainder of bony, ligamentous and tendinous landmarks are nontender.    Crepitus is Absent  Range of Motion:     Extension full / flexion full / pronation full / supination full  Strength:    Flexion limited slightly by pain  Special Tests:    Positive: pain with resisted elbow flexion, speeds Positive    Negative: Pain with resisted wrist extension, passive valgus stress, varus stress, cubital tunnel (ulnar Tinel's). Negative raya sign.       RADIOLOGY:  Final results and radiologist's interpretation, available in the [facility] health record.  Images were reviewed with the patient in the office today.    Reviewed and agree with interpretation  as below.     EXAM: ELBOW RIGHT THREE OR MORE VIEWS  DATE/TIME: 10/25/2023 9:21 AM      INDICATION: Right elbow pain.   COMPARISON: None.                                                                      IMPRESSION:  1.  Normal joint spacing and alignment.  2.  No fracture or joint effusion.  3.  Medial epicondyle, lateral epicondyle, and olecranon  enthesophytes.                  Again, thank you for allowing me to participate in the care of your patient.        Sincerely,        Funmilayo Arshad MD  "

## 2023-10-25 NOTE — PROGRESS NOTES
"ASSESSMENT & PLAN    Andrew was seen today for pain.    Diagnoses and all orders for this visit:    Right elbow pain  -     Orthopedic  Referral      This issue is acute and Unchanged.        Palpable distal biceps insertion but with Mild weakness mainly due to pain  Negative raya and strength only mildly weak less likely acute rupture.   Strain versus tear at this time  Will rest and start physical therapy at this time. No heavy lifting for the next two weeks no liftin gmore than 10lbs until symptoms improve.   Naproxen 500mg BID schedule for one week with food then as needed for pain relief.   Follow-up one month as needed      DO SUSANA Mckeon [facility] [place]    -----  Chief Complaint   Patient presents with    Right Elbow - Pain       SUBJECTIVE  Grant Shoemaker is a/an 63 year old male who is seen in consultation at the request of  Twila Melissa PA-C for evaluation of right elbow pain.     The patient is seen by themselves.  The patient is Right handed    Onset: 3 day(s) ago. Reports insidious onset without acute precipitating event.  Location of Pain: right anterior elbow  Worsened by: Lifting hand above shoulders.   Better with: Rest  Treatments tried: rest/activity avoidance, casting/splinting/bracing, and Advil  Associated symptoms: no distal numbness or tingling; denies swelling or warmth    Orthopedic/Surgical history: NO  Social History/Occupation:        REVIEW OF SYSTEMS:  10 point ROS is negative other than symptoms noted above in HPI, Past Medical History or as stated below  Constitutional: NEGATIVE for fever, chills, change in weight  Skin: NEGATIVE for worrisome rashes, moles or lesions  GI/: NEGATIVE for bowel or bladder changes  Neuro: NEGATIVE for weakness, dizziness or paresthesias      OBJECTIVE:  /83   Ht 1.702 m (5' 7\")   Wt 96.2 kg (212 lb)   BMI 33.20 kg/m     General: healthy, alert and in no distress  Skin: " no suspicious lesions or rash.  CV: distal perfusion intact bilateral UE  Resp: normal respiratory effort without conversational dyspnea   Psych: normal mood and affect  Gait: NORMAL  Neuro: Normal light sensory exam of upper extremity bilateral intact    RIGHT ELBOW  Inspection:    No swelling, bruising, discoloration, or obvious deformity or asymmetry  Palpation:    Tender about the lateral distal bicep tendon. Remainder of bony, ligamentous and tendinous landmarks are nontender.    Crepitus is Absent  Range of Motion:     Extension full / flexion full / pronation full / supination full  Strength:    Flexion limited slightly by pain  Special Tests:    Positive: pain with resisted elbow flexion, speeds Positive    Negative: Pain with resisted wrist extension, passive valgus stress, varus stress, cubital tunnel (ulnar Tinel's). Negative raya sign.       RADIOLOGY:  Final results and radiologist's interpretation, available in the [facility] health record.  Images were reviewed with the patient in the office today.    Reviewed and agree with interpretation  as below.     EXAM: ELBOW RIGHT THREE OR MORE VIEWS  DATE/TIME: 10/25/2023 9:21 AM      INDICATION: Right elbow pain.   COMPARISON: None.                                                                      IMPRESSION:  1.  Normal joint spacing and alignment.  2.  No fracture or joint effusion.  3.  Medial epicondyle, lateral epicondyle, and olecranon  enthesophytes.

## 2023-10-31 DIAGNOSIS — E03.9 ACQUIRED HYPOTHYROIDISM: ICD-10-CM

## 2023-10-31 RX ORDER — LEVOTHYROXINE SODIUM 150 UG/1
TABLET ORAL
Qty: 90 TABLET | Refills: 2 | Status: SHIPPED | OUTPATIENT
Start: 2023-10-31 | End: 2024-07-29

## 2023-11-01 RX ORDER — ATENOLOL 50 MG/1
50 TABLET ORAL DAILY
Qty: 90 TABLET | Refills: 3 | Status: SHIPPED | OUTPATIENT
Start: 2023-11-01

## 2023-11-01 RX ORDER — TRIAMTERENE/HYDROCHLOROTHIAZID 37.5-25 MG
1 TABLET ORAL DAILY
Qty: 90 TABLET | Refills: 3 | Status: SHIPPED | OUTPATIENT
Start: 2023-11-01

## 2024-01-11 ENCOUNTER — OFFICE VISIT (OUTPATIENT)
Dept: PODIATRY | Facility: CLINIC | Age: 64
End: 2024-01-11
Payer: COMMERCIAL

## 2024-01-11 VITALS — BODY MASS INDEX: 33.2 KG/M2 | DIASTOLIC BLOOD PRESSURE: 87 MMHG | SYSTOLIC BLOOD PRESSURE: 136 MMHG | WEIGHT: 212 LBS

## 2024-01-11 DIAGNOSIS — L84 CALLUS OF FOOT: Primary | ICD-10-CM

## 2024-01-11 DIAGNOSIS — M79.672 LEFT FOOT PAIN: ICD-10-CM

## 2024-01-11 PROCEDURE — 99213 OFFICE O/P EST LOW 20 MIN: CPT | Performed by: PODIATRIST

## 2024-01-11 NOTE — PATIENT INSTRUCTIONS
Thank you for choosing [facility] Podiatry / Foot & Ankle Surgery!    DR. CASTILLO'S CLINIC LOCATIONS:     [facility] TRIAGE LINE: [phone]   [address] APPOINTMENTS: [phone]   [place] [address] RADIOLOGY: [phone]   (Every other Tues - Wed - Fri PM) SET UP SURGERY: [phone]    PHYSICAL THERAPY: [phone]   [place] SPECIALTY BILLING QUESTIONS: [phone] [address] FAX: [phone]   [place] [address]    (Thurs & Fri AM)       DR. CASTILLO'S CALLUS/ CORN TREATMENT RECOMMENDATIONS    Please realize that most calluses come from your foot structure and resulting stress on the skin. They cannot be frozen, burned, or surgically removed. They will come back.    1) Use a foot cream to moisturize the callus and any dry skin on your feet.    2) Try to file the callus down with a pumice stone.  Any trimming with a sharp tool is at your own risk. If you injure yourself or are concerned about an infection, please return to clinic.    3) Try wearing shoes that have a rigid/ stiffer sole.  If they don't bend much when you walk, you won't but as much pressure on calluses that are in the ball-of-foot area.    4) Consider purchasing a cushioned insert and cutting a hole below the callus.    5) Another option for calluses under the ball of of the foot is to purchase a felt metatarsal pad. Hapad is a good brand. These can be found online.    6) Calluses on toes are called corns.  These also can be filed down and pads include donut pads, toe sleeves, tube foam, toe spacers.    7) You can return periodically for trimming of the corn or callus, but please check with your insurance to know if you have coverage or if you will have to pay out-of-pocket.    CALLUS / CORNS / IPKs  When there is excessive friction or pressure on the skin, the body responds by making the skin thicker to protect the deeper structures from becoming exposed. While this works well to protect the deeper  structures, the thickened skin can increase pressure and pain.    CALLUS: Flat, diffuse thickening are simple calluses and they are usually caused by friction. Often these are the result of rubbing on a shoe or going barefoot.    CORNS: Calluses with a central core between the toes are called corns. These result from prominent joints on adjacent toes rubbing together. Theses are a symptom of bone malalignment and will always recur unless the underlying bones are addressed surgically.    IPKs: Calluses with a central core on the ball of the foot are usually IPKs (intractable plantar keratosis). These are caused by excessive pressure from the metatarsals, the bones that make up the ball of the foot. Often one of these bones is too long or too prominent.  Again, these will always recur unless the underlying bone issue is addressed. There is no cure for these. They will either go away by themselves, recur, or more could develop.    ROUTINE MAINTENANCE  1. File them down with a pumice stone or callus file a couple times a week.   2. An electric callus removing device. Amope Pedi Perfect Electronic Pedicure Foot File and Callus Remover can be a good option.   3. Lotion can be applied to soften the callus. A urea based cream such as Kersal or Vanicream or thicker cream with shea butter are good options.  4. Toe spacers or toe covers can be used for corns, gel pads can be used for other lesions on the bottom of the foot.   If there is a surgical pathology noted, such as a prominent bone, often this needs to be addressed surgically to minimize recurrence. However, sometimes the lesion simply migrates to another spot after surgery, so it is not a guaranteed cure.     **If you come back to clinic for treatment, insurance does not cover it, and you would be billed. This charge could range from $100 - $227**

## 2024-01-11 NOTE — LETTER
1/11/2024         RE: Grant Shoemaker  [address] [facility] [address]        Dear Colleague,    Thank you for referring your patient, Grant Shoemaker, to the [facility] PODIATRY. Please see a copy of my visit note below.    ASSESSMENT:  Encounter Diagnoses   Name Primary?     nucleated Callus of foot Yes     Left foot pain      MEDICAL DECISION MAKING:  To allow adequate evaluation, I used #15 scalpel to pare down the hyperkeratotic skin.  The lesion did not have characteristics consistent with a plantar wart, but rather a nucleated hyperkeratotic lesion.    I explained that some patients return to clinic periodically for callus trimming, yet this might be an out-of-pocket expense.  We discussed the future need for him to sign an ABN waiver form.  I discussed other methods of treating calluses, as listed below.    DR. CASTILLO'S CALLUS/ CORN TREATMENT RECOMMENDATIONS    Please realize that most calluses come from your foot structure and resulting stress on the skin. They cannot be frozen, burned, or surgically removed. They will come back.    1) Use a foot cream to moisturize the callus and any dry skin on your feet.    2) Try to file the callus down with a pumice stone.  Any trimming with a sharp tool is at your own risk. If you injure yourself or are concerned about an infection, please return to clinic.    3) Try wearing shoes that have a rigid/ stiffer sole.  If they don't bend much when you walk, you won't but as much pressure on calluses that are in the ball-of-foot area.    4) Consider purchasing a cushioned insert and cutting a hole below the callus.    5) Another option for calluses under the ball of of the foot is to purchase a felt metatarsal pad. Hapad is a good brand. These can be found online.    6) Calluses on toes are called corns.  These also can be filed down and pads include donut pads, toe sleeves, tube foam, toe spacers.    7) You can return periodically for  trimming of the corn or callus, but please check with your insurance to know if you have coverage or if you will have to pay out-of-pocket.    Disclaimer: This note consists of symbols derived from keyboarding, dictation and/or voice recognition software. As a result, there may be errors in the script that have gone undetected. Please consider this when interpreting information found in this chart.    Deshaun Falcon, MORA, FACFAS, MS    [place] [facility] of Podiatry/Foot & Ankle Surgery      ____________________________________________________________________    HPI:       Andrew presents today reporting having plantar warts, left foot.  He noticed these 2 to 3 months ago.  There is associated burning and stabbing pain.  Intermittent  Some degree of pain daily  Exercise includes walking his dog and physical activity at work.  He works as a .  *  Past Medical History:   Diagnosis Date     Adenomatous polyp of colon      Hyperlipidemia      Hypertension      Hypothyroidism      ANN (obstructive sleep apnea)    *  *  Past Surgical History:   Procedure Laterality Date     BACK SURGERY  1999     COLONOSCOPY  4/2018     RELEASE CARPAL TUNNEL       RELEASE TRIGGER FINGER      x6     ZZC CERV ARTIFIC DISKECTOMY       ZZC LAMINOTOMY, SINGLE CERVICAL     *  *  Current Outpatient Medications   Medication Sig Dispense Refill     ASPIRIN ADULT LOW STRENGTH 81 MG EC tablet Take 1 tablet by mouth daily       atenolol (TENORMIN) 50 MG tablet Take 1 tablet (50 mg) by mouth daily ### DO NOT FILL NOW.  Please update patient's profile to reflect additional refills.  #### 90 tablet 3     calcium carbonate 750 MG CHEW Take 1,500 mg by mouth daily       Cholecalciferol (VITAMIN D3) 50 MCG (2000 UT) CAPS TAKE TWO CAPSULES BY MOUTH DAILY 180 capsule 3     levothyroxine (SYNTHROID/LEVOTHROID) 150 MCG tablet TAKE 1 TABLET(150 MCG) BY MOUTH DAILY 90 tablet 2     triamterene-HCTZ (MAXZIDE-25) 37.5-25 MG tablet Take 1 tablet  by mouth daily 90 tablet 3         EXAM:    Vitals: /87   Wt 96.2 kg (212 lb)   BMI 33.20 kg/m    BMI: Body mass index is 33.2 kg/m .    Constitutional:  Grant Shoemaker is in no apparent distress, appears well-nourished.  Cooperative with history and physical exam.    Vascular:  Pedal pulses are palpable for both the DP and PT arteries.  CFT < 3 sec.  No edema.      Neuro: Light touch sensation is intact to the L4, L5, S1 distributions  No evidence of weakness, spasticity, or contracture in the lower extremities.     Derm: There is a nucleated hyperkeratotic lesion on the plantar forefoot near the second third metatarsal heads.  This does not have characteristics of a verrucoid lesion.        Again, thank you for allowing me to participate in the care of your patient.        Sincerely,        Deshaun Falcon DPM

## 2024-01-11 NOTE — PROGRESS NOTES
ASSESSMENT:  Encounter Diagnoses   Name Primary?    nucleated Callus of foot Yes    Left foot pain      MEDICAL DECISION MAKING:  To allow adequate evaluation, I used #15 scalpel to pare down the hyperkeratotic skin.  The lesion did not have characteristics consistent with a plantar wart, but rather a nucleated hyperkeratotic lesion.    I explained that some patients return to clinic periodically for callus trimming, yet this might be an out-of-pocket expense.  We discussed the future need for him to sign an ABN waiver form.  I discussed other methods of treating calluses, as listed below.    DR. CASTILLO'S CALLUS/ CORN TREATMENT RECOMMENDATIONS    Please realize that most calluses come from your foot structure and resulting stress on the skin. They cannot be frozen, burned, or surgically removed. They will come back.    1) Use a foot cream to moisturize the callus and any dry skin on your feet.    2) Try to file the callus down with a pumice stone.  Any trimming with a sharp tool is at your own risk. If you injure yourself or are concerned about an infection, please return to clinic.    3) Try wearing shoes that have a rigid/ stiffer sole.  If they don't bend much when you walk, you won't but as much pressure on calluses that are in the ball-of-foot area.    4) Consider purchasing a cushioned insert and cutting a hole below the callus.    5) Another option for calluses under the ball of of the foot is to purchase a felt metatarsal pad. Hapad is a good brand. These can be found online.    6) Calluses on toes are called corns.  These also can be filed down and pads include donut pads, toe sleeves, tube foam, toe spacers.    7) You can return periodically for trimming of the corn or callus, but please check with your insurance to know if you have coverage or if you will have to pay out-of-pocket.    Disclaimer: This note consists of symbols derived from keyboarding, dictation and/or voice recognition software. As a  result, there may be errors in the script that have gone undetected. Please consider this when interpreting information found in this chart.    Deshaun Falcon, MORA, FACFAS, MS    [place] [facility] of Podiatry/Foot & Ankle Surgery      ____________________________________________________________________    HPI:       Andrew presents today reporting having plantar warts, left foot.  He noticed these 2 to 3 months ago.  There is associated burning and stabbing pain.  Intermittent  Some degree of pain daily  Exercise includes walking his dog and physical activity at work.  He works as a .  *  Past Medical History:   Diagnosis Date    Adenomatous polyp of colon     Hyperlipidemia     Hypertension     Hypothyroidism     ANN (obstructive sleep apnea)    *  *  Past Surgical History:   Procedure Laterality Date    BACK SURGERY  1999    COLONOSCOPY  4/2018    RELEASE CARPAL TUNNEL      RELEASE TRIGGER FINGER      x6    ZZC CERV ARTIFIC DISKECTOMY      ZZC LAMINOTOMY, SINGLE CERVICAL     *  *  Current Outpatient Medications   Medication Sig Dispense Refill    ASPIRIN ADULT LOW STRENGTH 81 MG EC tablet Take 1 tablet by mouth daily      atenolol (TENORMIN) 50 MG tablet Take 1 tablet (50 mg) by mouth daily ### DO NOT FILL NOW.  Please update patient's profile to reflect additional refills.  #### 90 tablet 3    calcium carbonate 750 MG CHEW Take 1,500 mg by mouth daily      Cholecalciferol (VITAMIN D3) 50 MCG (2000 UT) CAPS TAKE TWO CAPSULES BY MOUTH DAILY 180 capsule 3    levothyroxine (SYNTHROID/LEVOTHROID) 150 MCG tablet TAKE 1 TABLET(150 MCG) BY MOUTH DAILY 90 tablet 2    triamterene-HCTZ (MAXZIDE-25) 37.5-25 MG tablet Take 1 tablet by mouth daily 90 tablet 3         EXAM:    Vitals: /87   Wt 96.2 kg (212 lb)   BMI 33.20 kg/m    BMI: Body mass index is 33.2 kg/m .    Constitutional:  Grant Shoemaker is in no apparent distress, appears well-nourished.  Cooperative with history and physical  exam.    Vascular:  Pedal pulses are palpable for both the DP and PT arteries.  CFT < 3 sec.  No edema.      Neuro: Light touch sensation is intact to the L4, L5, S1 distributions  No evidence of weakness, spasticity, or contracture in the lower extremities.     Derm: There is a nucleated hyperkeratotic lesion on the plantar forefoot near the second third metatarsal heads.  This does not have characteristics of a verrucoid lesion.

## 2024-01-25 ASSESSMENT — SLEEP AND FATIGUE QUESTIONNAIRES
HOW LIKELY ARE YOU TO NOD OFF OR FALL ASLEEP WHILE SITTING INACTIVE IN A PUBLIC PLACE: WOULD NEVER DOZE
HOW LIKELY ARE YOU TO NOD OFF OR FALL ASLEEP WHILE WATCHING TV: WOULD NEVER DOZE
HOW LIKELY ARE YOU TO NOD OFF OR FALL ASLEEP WHILE LYING DOWN TO REST IN THE AFTERNOON WHEN CIRCUMSTANCES PERMIT: SLIGHT CHANCE OF DOZING
HOW LIKELY ARE YOU TO NOD OFF OR FALL ASLEEP IN A CAR, WHILE STOPPED FOR A FEW MINUTES IN TRAFFIC: WOULD NEVER DOZE
HOW LIKELY ARE YOU TO NOD OFF OR FALL ASLEEP WHILE SITTING AND READING: WOULD NEVER DOZE
HOW LIKELY ARE YOU TO NOD OFF OR FALL ASLEEP WHEN YOU ARE A PASSENGER IN A CAR FOR AN HOUR WITHOUT A BREAK: WOULD NEVER DOZE
HOW LIKELY ARE YOU TO NOD OFF OR FALL ASLEEP WHILE SITTING AND TALKING TO SOMEONE: WOULD NEVER DOZE
HOW LIKELY ARE YOU TO NOD OFF OR FALL ASLEEP WHILE SITTING QUIETLY AFTER LUNCH WITHOUT ALCOHOL: WOULD NEVER DOZE

## 2024-02-01 ENCOUNTER — OFFICE VISIT (OUTPATIENT)
Dept: SLEEP MEDICINE | Facility: CLINIC | Age: 64
End: 2024-02-01
Payer: COMMERCIAL

## 2024-02-01 VITALS
OXYGEN SATURATION: 96 % | HEIGHT: 67 IN | HEART RATE: 63 BPM | WEIGHT: 216.6 LBS | BODY MASS INDEX: 34 KG/M2 | SYSTOLIC BLOOD PRESSURE: 131 MMHG | DIASTOLIC BLOOD PRESSURE: 84 MMHG

## 2024-02-01 DIAGNOSIS — G47.33 OSA (OBSTRUCTIVE SLEEP APNEA): Primary | ICD-10-CM

## 2024-02-01 DIAGNOSIS — G47.21 DELAYED SLEEP PHASE SYNDROME: ICD-10-CM

## 2024-02-01 PROCEDURE — 99215 OFFICE O/P EST HI 40 MIN: CPT | Performed by: PHYSICIAN ASSISTANT

## 2024-02-01 NOTE — NURSING NOTE
"Chief Complaint   Patient presents with    Sleep Problem     Follow up izaiah, suggestions on sleep inertia        Initial /84   Pulse 63   Ht 1.702 m (5' 7\")   Wt 98.2 kg (216 lb 9.6 oz)   SpO2 96%   BMI 33.92 kg/m   Estimated body mass index is 33.92 kg/m  as calculated from the following:    Height as of this encounter: 1.702 m (5' 7\").    Weight as of this encounter: 98.2 kg (216 lb 9.6 oz).    Medication Reconciliation: complete  ESS 1  CHRISTINE 4  DME: CHEYANNE Bates MA      "

## 2024-02-01 NOTE — PROGRESS NOTES
CPAP Follow-Up Visit:    Date on this visit: 2/1/2024    Grant Shoemaker has a follow-up visit today to review his CPAP use for ANN.      Sleep study date: 08/09/1994 baseline @ [facility] . AHI/ ERIKA: 7.4 hr     Used MAD initially but eventually switched to CPAP.      [employer] Dreamstation 2    Auto-PAP 9-14  cmH2O 30 day usage data:    The compliance data shows that the patient used the CPAP for 30/30 nights, 100% of nights for >4 hours.  The 90th% pressure is 12 cm.  The average time in large leak is 6 sec.  The average nightly usage is 8:19.  The average AHI is 5.5/hr (2.5/hr OA, 1.4/hr CA, 1.6/hr hyp).       Andrew is a new patient to me. He was previous followed by Dr. Ascencio, last seen in 2021. He obtained a Dreamstation 2 from [employer] since his last visit (due to his prior machine being recalled).    He is using a different mask and is comfortable with that now. His main concern today is that he needs to order supplies. He says he takes to CPAP like a duck to water.        No specialty comments available.    Do you use a CPAP Machine at home: Yes  Overall, on a scale of 0-10 how would you rate your CPAP (0 poor, 10 great): 10    What type of mask do you use: AirFit N20.  Is your mask comfortable: Yes  If not, why:    How often do you replace supplies: Normally monthly (filters, cushion), other parts when covered.     Is your mask leaking: No  If yes, where do you feel it:    How many night per week does the mask leak (0-7):      Do you notice snoring with mask on: No, not observed while sleeping though  Do you notice gasping arousals with mask on: No  Are you having significant oral or nasal dryness: No  Are you using the humidifier: yes, in the winter  Does the water chamber run out before the night is over:no  Do you get condensation in the mask or hose:no  Is the pressure setting comfortable: Yes  If not, why:      Typical bedtime: 11:00 pm  Sleep latency on PAP therapy: less than half an  hour  Typical wake time: 9:00 am  Wakes 0-1 times per night for 5-10 minutes. Reason for waking: restroom  How many hours on average per night are you using PAP therapy: 10+  How many hours are you sleeping per night: 10+  Do you feel well rested in the morning: Yes  He says he has sleep inertia, so it can take  minutes to wake enough to get up.   Sets alarm for 6 AM (once a week) to get up on time to get to work. Other days, he starts coming to around 8:30-9 AM, dog starts to wake him too. He has had difficulty getting up all of his life.   Naps: 0-1 times per week for 30-60 minutes.   Pretty rare. No inadvertent dozing. Once he can get out of bed, his energy is ok.       Weight change since sleep study: 216 lbs      Past medical/surgical history, family history, social history, medications and allergies were reviewed.      Problem List:  Patient Active Problem List    Diagnosis Date Noted    Stage 3 chronic kidney disease, unspecified whether stage 3a or 3b CKD (H) 08/18/2023     Priority: Medium    Morbid obesity (H) 03/17/2021     Priority: Medium    ANN (obstructive sleep apnea) 03/17/2020     Priority: Medium     On CPAP  CPAP Style: [employer] Dreamstation 500   Asset/machine and modem s/n #: [id number]  Initial or replacement: Replacement  Setup location: [place]  Date of setup: 5/24/2018  Ordering Provider: Virgen RIZVI   Pressure: Auto 5-20cm h2o   Sleep study date: 08/09/1994 baseline @ [facility]   AHI/ ERIKA: 7.4 hr        Hyperlipidemia LDL goal <130 03/17/2020     Priority: Medium    Benign essential hypertension 03/17/2020     Priority: Medium    Acquired hypothyroidism 03/17/2020     Priority: Medium        Impression/Plan:    (G47.33) ANN (obstructive sleep apnea)  (primary encounter diagnosis)  Comment: Andrew uses CPAP regularly and does very well with it. His download shows mildly elevated AHI, events scattered, suggesting  sleep fragmentation as a cause.  Plan: Comprehensive DME        Continue auto CPAP 9-14 cm. A prescription was written for new supplies. We reviewed recommendations for cleaning and replacing supplies.       (G47.21) Delayed sleep phase syndrome  Comment: Andrew reports sleep inertia. He struggles to function cognitively enough to get out of bed before 8:30-9 AM for the most part. He has to open at work  on Sunday morning, requiring him to be up at 6 AM, though. His sleep study showed frequent sleep stage changes, suggesting frequent arousals. His CPAP download shows scattered apnea events, also suggesting sleep fragmentation. His sleep opportunity is excessive on some nights, up to 11 hours in bed at times (denies depression).   Plan: We will try some strategies to consolidate and advance his sleep. I recommended compressing to 8-8.5 hours in bed at most. We discussed getting 30 minutes of bright light exposure in the morning, either with the sun or a SAD Lamp of 10,000 lux intensity. Avoid bright light, including electronics, in the hour before bedtime. Take 1 mg melatonin 3-5 hours prior to natural sleep onset. Keep a consistent sleep schedule, avoiding naps and sleeping in.  If this does not work, we could try a sleep aid to see if consolidating  his sleep that way improves his energy. Lastly, we could try something like bupropion at bedtime to see if he can be more alert when trying to wake. I suspect that may cause more sleep disruption though.     He will follow up with me in about 6 month(s).     41 minutes were spent on the date of the encounter doing chart review, history and exam, documentation and further activities as noted above.     Bennett Goltz, PA-C    CC: No ref. provider found

## 2024-02-01 NOTE — PATIENT INSTRUCTIONS
Instructions for treating Delayed Sleep Phase Syndrome:    Delayed Sleep Phase Syndrome (DSPS) means that your body's internal timing is set late compared to the 24 hour day. Therefore, it is often difficult to get up on time for work in the morning and sometimes difficult to fall asleep on time, in order to get enough sleep. People with DSPS often tend to like to stay up late on weekends and sleep in until between 10 AM and noon, sometimes even later.This is actually a bad habit that will perpetuate the problem. It reinforces your body's tendency to be on that later schedule. Try limiting time in bed to 8-8.5 hours at most.     You should go to bed when you are sleepy and ready to sleep. During this entire process, you should not engage in activities that may make it worse, such as watching TV in bed, leaving the TV on all night, drinking any caffeine 6 hours before bed or exercising 1-2 hours before bed.     Start taking Melatonin, 0.5-1 mg tablet 3-5 hours before the time that you fall asleep on average (not your desired bedtime or time that you get in bed, but the time you normally fall asleep on your own).     Upon awakening, get exposure to sun-light for about 30-45 minutes. You do not need to look at the sun, in fact, this is dangerous. Reading the paper with the sun shining on you is adequate.  Alternatively, you may use a Seasonal Affective Disorder Lamp (intensity 10,000 Lux) instead of the sun (search for a romy simulator). The lamp should be positioned 1-2 arms lengths away from you. They lamps are sold at Home Medical Companies such as [employer] or [employer]. A prescription can be written to get insurance coverage in some cases. They are also sold on [employer].    Using the light and melatonin should help march your internal clock (known as your circadian rhythms) gradually earlier. As your bedtime advances, remember to take your melatonin earlier, keeping it 5 hours before your fall  asleep time.    Avoid naps and sleeping in because sleeping during the day will delay your body's clock and you will have to start from scratch.     More information about light therapy:  If the cost of any light box is too much, you can also purchase a compact fluorescent all spectrum light bulb at a local hardware store for about $8.  The most commonly available bulb is 1400 lumen.  You would need two of these positioned within 1 meter of yourself to be equivalent to 2,500 lux.  The bulbs can be placed in a standard light fixture.  Additionally, they can be placed in a mountable fixture that is used in [employer].  Mountable fixtures are also available at hardware stores for about $9.  Do not look directly at the light.  If you have any concerns regarding the safety of bright light therapy for you, it is recommended that you consult an ophthalmologist before using a light box.  If you have a condition that makes your eyes very sensitive to light, macular degeneration, a family history of such problems, or diabetic changes to your eyes, consult an ophthalmologist before using a light box. If you have anxiety disorder and have an increase in anxiety discontinue use.

## 2024-02-16 ENCOUNTER — OFFICE VISIT (OUTPATIENT)
Dept: ORTHOPEDICS | Facility: CLINIC | Age: 64
End: 2024-02-16
Payer: COMMERCIAL

## 2024-02-16 VITALS
HEIGHT: 67 IN | BODY MASS INDEX: 33.9 KG/M2 | SYSTOLIC BLOOD PRESSURE: 129 MMHG | WEIGHT: 216 LBS | DIASTOLIC BLOOD PRESSURE: 84 MMHG

## 2024-02-16 DIAGNOSIS — M17.11 PRIMARY LOCALIZED OSTEOARTHRITIS OF RIGHT KNEE: Primary | ICD-10-CM

## 2024-02-16 DIAGNOSIS — M25.561 ACUTE PAIN OF RIGHT KNEE: ICD-10-CM

## 2024-02-16 PROCEDURE — 20611 DRAIN/INJ JOINT/BURSA W/US: CPT | Mod: RT | Performed by: STUDENT IN AN ORGANIZED HEALTH CARE EDUCATION/TRAINING PROGRAM

## 2024-02-16 PROCEDURE — 99204 OFFICE O/P NEW MOD 45 MIN: CPT | Mod: 25 | Performed by: STUDENT IN AN ORGANIZED HEALTH CARE EDUCATION/TRAINING PROGRAM

## 2024-02-16 RX ORDER — ROPIVACAINE HYDROCHLORIDE 5 MG/ML
3 INJECTION, SOLUTION EPIDURAL; INFILTRATION; PERINEURAL
Status: SHIPPED | OUTPATIENT
Start: 2024-02-16

## 2024-02-16 RX ORDER — LIDOCAINE HYDROCHLORIDE 10 MG/ML
3 INJECTION, SOLUTION INFILTRATION; PERINEURAL
Status: SHIPPED | OUTPATIENT
Start: 2024-02-16

## 2024-02-16 RX ORDER — TRIAMCINOLONE ACETONIDE 40 MG/ML
40 INJECTION, SUSPENSION INTRA-ARTICULAR; INTRAMUSCULAR
Status: SHIPPED | OUTPATIENT
Start: 2024-02-16

## 2024-02-16 RX ADMIN — LIDOCAINE HYDROCHLORIDE 3 ML: 10 INJECTION, SOLUTION INFILTRATION; PERINEURAL at 11:00

## 2024-02-16 RX ADMIN — TRIAMCINOLONE ACETONIDE 40 MG: 40 INJECTION, SUSPENSION INTRA-ARTICULAR; INTRAMUSCULAR at 11:00

## 2024-02-16 RX ADMIN — ROPIVACAINE HYDROCHLORIDE 3 ML: 5 INJECTION, SOLUTION EPIDURAL; INFILTRATION; PERINEURAL at 11:00

## 2024-02-16 NOTE — PROGRESS NOTES
ASSESSMENT & PLAN    Andrew was seen today for pain.    Diagnoses and all orders for this visit:    Primary localized osteoarthritis of right knee  -     Physical Therapy Referral; Future  -     Large Joint Injection/Arthocentesis: R knee joint  -     lidocaine 1 % injection 3 mL  -     triamcinolone (KENALOG-40) injection 40 mg  -     3 mL ropivacaine (NAROPIN) injection 5 mg/mL    Acute pain of right knee  -     Large Joint Injection/Arthocentesis: R knee joint  -     lidocaine 1 % injection 3 mL  -     triamcinolone (KENALOG-40) injection 40 mg  -     3 mL ropivacaine (NAROPIN) injection 5 mg/mL      63-year-old male presents with acute right knee pain ongoing for the past 8 days that started while walking with no clear injury.  His pain is primarily localized over the lateral joint line, and he has associated reduced range of motion, stiffness, and difficulty tolerating special testing due to pain, however significantly increased pain when attempting Bernard's.  X-ray from outside facility does reveal patellofemoral and medial compartment osteoarthritis with significant bony spurring.  We discussed that a degenerative lateral meniscus tear is at the top my differential diagnosis for his current source of pain.    Plan:  -Tylenol Extra Strength (1000mg) up to three times daily as needed for pain  -R knee aspiration and corticosteroid injection in clinic today  -Can wear current hinged knee brace as needed, can try Neenca knee brace if something lower profile is desired  -Start PT and home exercise program   -Can continue to use cane to offload knee.  Discussed proper use of cane in clinic today  -If no improvement, could consider MRI in the future    Return in about 2 months (around 4/16/2024).    Large Joint Injection/Arthocentesis: R knee joint    Date/Time: 2/16/2024 11:00 AM    Performed by: Noemí Tsai DO  Authorized by: Noemí Tsai DO    Indications:  Pain and osteoarthritis  Needle  "Size:  22 G  Guidance: ultrasound    Approach:  Anterolateral  Location:  Knee      Medications:  40 mg triamcinolone 40 MG/ML; 3 mL ROPivacaine 5 MG/ML; 3 mL lidocaine 1 %  Aspirate amount (mL):  16  Aspirate:  Yellow and clear  Outcome:  Tolerated well, no immediate complications  Procedure discussed: discussed risks, benefits, and alternatives    Consent Given by:  Patient  Timeout: timeout called immediately prior to procedure    Prep: patient was prepped and draped in usual sterile fashion     Ultrasound was used to ensure safe and accurate needle placement and injection. Ultrasound images of the procedure were permanently stored. 1ml of 8.4% Sodium Bicarbonate solution was used to buffer the local numbing agent for today's injection            Dr. Noemí Tsai DO, CAQ  [facility]  Sports Medicine     -----  Chief Complaint   Patient presents with    Right Knee - Pain       SUBJECTIVE  Grant Shoemaker is a/an 63 year old male who is seen as a self referral for evaluation of right knee pain.  Onset was 8 days ago with no injury. Pain is located anterior knee below the patella. Symptoms are worsened by weight bearing.  He has tried rest, ibuprofen, acetaminophen, knee brace, and cane. Associated symptoms include none. Denies instability.     The patient is seen alone    Prior injury/Surgical history of affected joint: none  Social History/Occupation:     REVIEW OF SYSTEMS:  Pertinent positives/negative: As stated above in HPI    OBJECTIVE:  /84   Ht 1.702 m (5' 7\")   Wt 98 kg (216 lb)   BMI 33.83 kg/m     General: Alert and in no distress  Skin: no visable rashes  CV: Extremities appear well perfused   Resp: normal respiratory effort, no conversational dyspnea   Psych: normal mood, affect  MSK:  RIGHT KNEE  Inspection:    Normal alignment; no edema, erythema, or ecchymosis present  Palpation:    Tender about the lateral joint line. Remainder of bony and " ligamentous landmarks are nontender.    Trace effusion is present    Patellofemoral crepitus is Present  Range of Motion:     50 extension to 900 flexion  Strength:    Extensor mechanism intact, but difficulty with knee extension secondary to pain  Special Tests:    Positive: Bernard     Negative: Valgus stress (0 and 30), Varus stress (0 and 30), and Lachman       RADIOLOGY:  Final results and radiologist's interpretation available in the [facility] health record.  Images below were personally reviewed and discussed with the patient in the office today.  My personal interpretation of the performed imaging: X-ray of the right knee from outside facility performed 2/8/2024 reveals moderate patellofemoral compartment osteoarthritis and mild medial medial compartment osteoarthritis

## 2024-02-16 NOTE — PATIENT INSTRUCTIONS
"1. Primary localized osteoarthritis of right knee    2. Acute lateral meniscus tear of right knee, initial encounter        Plan:  -Tylenol Extra Strength (1000mg) up to three times daily as needed for pain  -R knee corticosteroid injection in clinic today  -Can wear knee brace as needed, can try Neenca knee brace  -Start PT and home exercise program     If you have any questions or concerns after your appointment, please send a [employer] message or call the clinic at [phone]    Noemí Tsai DO, CAM  [facility]  Sports Medicine    Thank you for choosing [facility]!    DR. TSAI'S CLINIC LOCATIONS:     [place]  TRIAGE LINE: [phone]   [facility] [address] APPOINTMENTS: [phone]   [place] [address] RADIOLOGY: [phone]   (Mondays & Tuesdays) HAND THERAPY: [phone]    PHYSICAL THERAPY: [phone]   [place] BILLING QUESTIONS: [phone] [address] FAX: [phone]   [place] [address]    (Thursdays & Fridays)       Non-Operative treatment of Knee Osteoarthritis    To Decrease Stress  Stay fit and get regular exercise    Muscle Strengthening: Consider physical therapy  Activity Modification: Avoid high-impact activities  Weight Control  Consider using walking poles/cane or a knee brace to offload knee    To Decrease Pain  Tylenol (Acetaminophen) as needed 2 tablets (1,000 mg) three times per day, not to exceed 3,000 mg per day   Trial topical Voltaren (Diclofenac) gel up to 4x daily to area of pain  Glucosamine chondroiten (try taking for 3 months and if helpful, continue)  Steroid injections (no sooner than every 3 months)  Viscosupplementation/\"gel\" injections (Synvisc, Euflexxa, etc. are brand names)  Platelet Rich Plasma (Not covered by insurance)     Post-Injection Discharge Instructions    You may shower, however avoid swimming, tub baths or hot tubs for 24 hours following your procedure  You may have a mild " to moderate increase in pain for a few days following the injection.  The lidocaine (local numbing medicine) will wear off in several hours. It usually takes 3-5 days for the steroid medication to start working although it may take up to 14 days for full effect.   You may use ice packs for 10-15 minutes, 3 to 4 times a day at the injection site for comfort if needed  You may use extra strength Tylenol for pain control if necessary   If you were fasting, you may resume your normal diet and medications after the procedure  If you have diabetes, your blood sugar may be higher than normal for 10-14 days following a steroid injection. Contact your doctor who manages your diabetes if your blood sugar is significantly higher than usual    If you experience any of the following, call Sports Medicine @ [phone] or [phone]  -Fever over 100 degrees F  -Swelling, bleeding, redness, drainage, warmth at the injection site  -New or significant worsening pain

## 2024-02-16 NOTE — LETTER
2/16/2024         RE: Grant Shoemaker  [address] [address]        Dear Colleague,    Thank you for referring your patient, Grant Shoemaker, to the [facility] [place]. Please see a copy of my visit note below.    ASSESSMENT & PLAN    Andrew was seen today for pain.    Diagnoses and all orders for this visit:    Primary localized osteoarthritis of right knee  -     Physical Therapy Referral; Future    Acute lateral meniscus tear of right knee, initial encounter    Other orders  -     Large Joint Injection/Arthocentesis: R knee joint      63-year-old male presents with acute right knee pain ongoing for the past 8 days that started while walking with no clear injury.  His pain is primarily localized over the lateral joint line, and he has associated reduced range of motion, stiffness, and difficulty tolerating special testing due to pain, however significantly increased pain when attempting Bernard's.  X-ray from outside facility does reveal patellofemoral and medial compartment osteoarthritis with significant bony spurring.  We discussed that a degenerative lateral meniscus tear is at the top my differential diagnosis for his current source of pain.    Plan:  -Tylenol Extra Strength (1000mg) up to three times daily as needed for pain  -R knee aspiration and corticosteroid injection in clinic today  -Can wear current hinged knee brace as needed, can try Neenca knee brace if something lower profile is desired  -Start PT and home exercise program   -Can continue to use cane to offload knee.  Discussed proper use of cane in clinic today  -If no improvement, could consider MRI in the future    Return in about 2 months (around 4/16/2024).    Large Joint Injection/Arthocentesis: R knee joint    Date/Time: 2/16/2024 11:00 AM    Performed by: Noemí Tsai DO  Authorized by: Noemí Tsai DO    Indications:  Pain and osteoarthritis  Needle Size:  22  "G  Guidance: ultrasound    Approach:  Anterolateral  Location:  Knee      Medications:  40 mg triamcinolone 40 MG/ML; 3 mL ROPivacaine 5 MG/ML; 3 mL lidocaine 1 %  Aspirate amount (mL):  16  Aspirate:  Yellow and clear  Outcome:  Tolerated well, no immediate complications  Procedure discussed: discussed risks, benefits, and alternatives    Consent Given by:  Patient  Timeout: timeout called immediately prior to procedure    Prep: patient was prepped and draped in usual sterile fashion     Ultrasound was used to ensure safe and accurate needle placement and injection. Ultrasound images of the procedure were permanently stored. 1ml of 8.4% Sodium Bicarbonate solution was used to buffer the local numbing agent for today's injection            Dr. Noemí Tsai, DO, CAQ  [facility]  Sports Medicine     -----  Chief Complaint   Patient presents with     Right Knee - Pain       SUBJECTIVE  Grant Shoemaker is a/an 63 year old male who is seen as a self referral for evaluation of right knee pain.  Onset was 8 days ago with no injury. Pain is located anterior knee below the patella. Symptoms are worsened by weight bearing.  He has tried rest, ibuprofen, acetaminophen, knee brace, and cane. Associated symptoms include none. Denies instability.     The patient is seen alone    Prior injury/Surgical history of affected joint: none  Social History/Occupation:     REVIEW OF SYSTEMS:  Pertinent positives/negative: As stated above in HPI    OBJECTIVE:  /84   Ht 1.702 m (5' 7\")   Wt 98 kg (216 lb)   BMI 33.83 kg/m     General: Alert and in no distress  Skin: no visable rashes  CV: Extremities appear well perfused   Resp: normal respiratory effort, no conversational dyspnea   Psych: normal mood, affect  MSK:  RIGHT KNEE  Inspection:    Normal alignment; no edema, erythema, or ecchymosis present  Palpation:    Tender about the lateral joint line. Remainder of bony and " ligamentous landmarks are nontender.    Trace effusion is present    Patellofemoral crepitus is Present  Range of Motion:     50 extension to 900 flexion  Strength:    Extensor mechanism intact, but difficulty with knee extension secondary to pain  Special Tests:    Positive: Bernard     Negative: Valgus stress (0 and 30), Varus stress (0 and 30), and Lachman       RADIOLOGY:  Final results and radiologist's interpretation available in the [facility] health record.  Images below were personally reviewed and discussed with the patient in the office today.  My personal interpretation of the performed imaging: X-ray of the right knee from outside facility performed 2/8/2024 reveals moderate patellofemoral compartment osteoarthritis and mild medial medial compartment osteoarthritis                   Again, thank you for allowing me to participate in the care of your patient.        Sincerely,        Noemí Tsai, DO

## 2024-02-21 ENCOUNTER — THERAPY VISIT (OUTPATIENT)
Dept: PHYSICAL THERAPY | Facility: CLINIC | Age: 64
End: 2024-02-21
Attending: STUDENT IN AN ORGANIZED HEALTH CARE EDUCATION/TRAINING PROGRAM
Payer: COMMERCIAL

## 2024-02-21 DIAGNOSIS — M17.11 PRIMARY LOCALIZED OSTEOARTHRITIS OF RIGHT KNEE: ICD-10-CM

## 2024-02-21 PROCEDURE — 97530 THERAPEUTIC ACTIVITIES: CPT | Mod: GP | Performed by: PHYSICAL THERAPIST

## 2024-02-21 PROCEDURE — 97161 PT EVAL LOW COMPLEX 20 MIN: CPT | Mod: GP | Performed by: PHYSICAL THERAPIST

## 2024-02-21 PROCEDURE — 97110 THERAPEUTIC EXERCISES: CPT | Mod: GP | Performed by: PHYSICAL THERAPIST

## 2024-02-21 NOTE — PROGRESS NOTES
PHYSICAL THERAPY EVALUATION  Type of Visit: Evaluation    See electronic medical record for Abuse and Falls Screening details.    Subjective     Patient reports his knee suddenly started hurting when he was leavinig for work one morning about 10 days ago with no specific injury. He had appointment with Dr. Tsai on 2/16/2024 where he received a cortisone injection with some relief however is stil swollen and painful.Per this appointment and x-ray from outside facility reveals patellofemoral and medial compartment OA with significant bony spurring.   He has been wearing a knee brace, and using a cane since this time.   He works as a  and is on his feet all day.    Presenting condition or subjective complaint: right knee pain- torn meniscus  Date of onset: 02/10/24    Relevant medical history: High blood pressure; Sleep disorder like apnea; Thyroid problems; Vision problems   Dates & types of surgery:      Prior diagnostic imaging/testing results:     x-ray- patellofemoral and medical compartment OA with significant bony spurs  Prior therapy history for the same diagnosis, illness or injury: No      Prior Level of Function  Transfers: pain free- without cane  Ambulation: pain free- without cane  ADL: pain free- without cane  IADL: pain free- without cane    Living Environment  Type of home: [facility]   Stairs to enter the home: Yes   Is there a railing: Yes   Ramp: No   Stairs inside the home: Yes   Is there a railing: Yes   Help at home:    Equipment owned: Straight Cane     Employment: Yes   Hobbies/Interests:      Patient goals for therapy: be pain free, oversomce difficulty with stairs    Pain assessment: 6/10 pain     Objective   Gait:  Decreased WB on the right, slow steady alternating with SPC    Knee AROM/PROM: R -10 to 108, L 0-135    Hip ROM: WFL, tight hip flexors, limited hip extension    Knee Strength (* = pain) Right Left   FL *3-/5 5/5   EXT *3-/5 5/5   Quad  Contraction (Good/Fair/Poor) *poor good   Hip Extension DNT DNT   Hip ABD DNT DNT       Palpation Tenderness:  pt is tender to palpation MCL, medial joint line, around patella, and posterior knee    Swelling/Circumferential Measurements: circumerential symmetrical, however sweep test positive        Other tests: did not complete all special tests due to acute injury and pain     Assessment & Plan   CLINICAL IMPRESSIONS  Medical Diagnosis: OA- right knee    Treatment Diagnosis: OA-right knee-acute pain   Impression/Assessment: Patient is a 63 year old male with acute right knee pain complaints.  The following significant findings have been identified: Pain, Decreased ROM/flexibility, Decreased joint mobility, Decreased strength, Impaired balance, Edema, Impaired gait, Impaired muscle performance, and Decreased activity tolerance. These impairments interfere with their ability to perform self care tasks, work tasks, recreational activities, household chores, driving , household mobility, and community mobility as compared to previous level of function.     Clinical Decision Making (Complexity):  Clinical Presentation: Stable/Uncomplicated  Clinical Presentation Rationale: based on medical and personal factors listed in PT evaluation  Clinical Decision Making (Complexity): Low complexity    PLAN OF CARE  Treatment Interventions:  Modalities: Cryotherapy, E-stim  Interventions: Gait Training, Manual Therapy, Neuromuscular Re-education, Therapeutic Activity, Therapeutic Exercise, Self-Care/Home Management    Long Term Goals     PT Goal 1  Goal Identifier: AROM  Goal Description: Pt will demo right knee AROM to at least -5 to 120  Rationale: to maximize safety and independence with performance of ADLs and functional tasks;to maximize safety and independence within the home;to maximize safety and independence within the community;to maximize safety and independence with self cares  Target Date: 05/08/24  PT Goal 2  Goal  Identifier: Pain  Goal Description: Pt will report at least 50% decrease in pain in right knee when standing and walking.  Rationale: to maximize safety and independence with performance of ADLs and functional tasks;to maximize safety and independence within the home;to maximize safety and independence within the community;to maximize safety and independence with transportation;to maximize safety and independence with self cares  Target Date: 05/08/24      Frequency of Treatment: 1x/week  Duration of Treatment: 12 weeks    Recommended Referrals to Other Professionals:   Education Assessment:   Learner/Method: No Barriers to Learning  Education Comments: no concerns    Risks and benefits of evaluation/treatment have been explained.   Patient/Family/caregiver agrees with Plan of Care.     Evaluation Time:     PT Eval, Low Complexity Minutes (72287): 25     Signing Clinician: Hailey Tijerina PT

## 2024-02-28 ENCOUNTER — THERAPY VISIT (OUTPATIENT)
Dept: PHYSICAL THERAPY | Facility: CLINIC | Age: 64
End: 2024-02-28
Payer: COMMERCIAL

## 2024-02-28 DIAGNOSIS — M25.561 ACUTE PAIN OF RIGHT KNEE: Primary | ICD-10-CM

## 2024-02-28 PROCEDURE — 97110 THERAPEUTIC EXERCISES: CPT | Mod: GP | Performed by: PHYSICAL THERAPIST

## 2024-02-28 ASSESSMENT — ACTIVITIES OF DAILY LIVING (ADL)
STAND: ACTIVITY IS MINIMALLY DIFFICULT
GIVING WAY, BUCKLING OR SHIFTING OF KNEE: I DO NOT HAVE THE SYMPTOM
PAIN: THE SYMPTOM AFFECTS MY ACTIVITY MODERATELY
RISE FROM A CHAIR: ACTIVITY IS MINIMALLY DIFFICULT
HOW_WOULD_YOU_RATE_THE_OVERALL_FUNCTION_OF_YOUR_KNEE_DURING_YOUR_USUAL_DAILY_ACTIVITIES?: NEARLY NORMAL
KNEE_ACTIVITY_OF_DAILY_LIVING_SCORE: 60
LIMPING: THE SYMPTOM AFFECTS MY ACTIVITY MODERATELY
AS_A_RESULT_OF_YOUR_KNEE_INJURY,_HOW_WOULD_YOU_RATE_YOUR_CURRENT_LEVEL_OF_DAILY_ACTIVITY?: NEARLY NORMAL
SWELLING: THE SYMPTOM AFFECTS MY ACTIVITY MODERATELY
GO DOWN STAIRS: ACTIVITY IS VERY DIFFICULT
STIFFNESS: THE SYMPTOM AFFECTS MY ACTIVITY SLIGHTLY
KNEEL ON THE FRONT OF YOUR KNEE: I AM UNABLE TO DO THE ACTIVITY
SIT WITH YOUR KNEE BENT: ACTIVITY IS NOT DIFFICULT
HOW_WOULD_YOU_RATE_THE_CURRENT_FUNCTION_OF_YOUR_KNEE_DURING_YOUR_USUAL_DAILY_ACTIVITIES_ON_A_SCALE_FROM_0_TO_100_WITH_100_BEING_YOUR_LEVEL_OF_KNEE_FUNCTION_PRIOR_TO_YOUR_INJURY_AND_0_BEING_THE_INABILITY_TO_PERFORM_ANY_OF_YOUR_USUAL_DAILY_ACTIVITIES?: 55
GO UP STAIRS: ACTIVITY IS FAIRLY DIFFICULT
KNEE_ACTIVITY_OF_DAILY_LIVING_SUM: 42
WEAKNESS: I HAVE THE SYMPTOM BUT IT DOES NOT AFFECT MY ACTIVITY
RAW_SCORE: 42
SQUAT: ACTIVITY IS MINIMALLY DIFFICULT
WALK: ACTIVITY IS MINIMALLY DIFFICULT

## 2024-03-06 ENCOUNTER — THERAPY VISIT (OUTPATIENT)
Dept: PHYSICAL THERAPY | Facility: CLINIC | Age: 64
End: 2024-03-06
Payer: COMMERCIAL

## 2024-03-06 DIAGNOSIS — M25.561 ACUTE PAIN OF RIGHT KNEE: Primary | ICD-10-CM

## 2024-03-06 PROCEDURE — 97110 THERAPEUTIC EXERCISES: CPT | Mod: GP | Performed by: PHYSICAL THERAPIST

## 2024-03-13 ENCOUNTER — THERAPY VISIT (OUTPATIENT)
Dept: PHYSICAL THERAPY | Facility: CLINIC | Age: 64
End: 2024-03-13
Payer: COMMERCIAL

## 2024-03-13 DIAGNOSIS — M25.561 ACUTE PAIN OF RIGHT KNEE: Primary | ICD-10-CM

## 2024-03-13 PROCEDURE — 97110 THERAPEUTIC EXERCISES: CPT | Mod: GP | Performed by: PHYSICAL THERAPIST

## 2024-03-20 ENCOUNTER — THERAPY VISIT (OUTPATIENT)
Dept: PHYSICAL THERAPY | Facility: CLINIC | Age: 64
End: 2024-03-20
Payer: COMMERCIAL

## 2024-03-20 DIAGNOSIS — M25.561 ACUTE PAIN OF RIGHT KNEE: Primary | ICD-10-CM

## 2024-03-20 PROCEDURE — 97110 THERAPEUTIC EXERCISES: CPT | Mod: GP | Performed by: PHYSICAL THERAPIST

## 2024-04-03 ENCOUNTER — THERAPY VISIT (OUTPATIENT)
Dept: PHYSICAL THERAPY | Facility: CLINIC | Age: 64
End: 2024-04-03
Payer: COMMERCIAL

## 2024-04-03 DIAGNOSIS — M25.561 ACUTE PAIN OF RIGHT KNEE: Primary | ICD-10-CM

## 2024-04-03 PROCEDURE — 97110 THERAPEUTIC EXERCISES: CPT | Mod: GP | Performed by: PHYSICAL THERAPIST

## 2024-04-10 ENCOUNTER — THERAPY VISIT (OUTPATIENT)
Dept: PHYSICAL THERAPY | Facility: CLINIC | Age: 64
End: 2024-04-10
Payer: COMMERCIAL

## 2024-04-10 ENCOUNTER — LAB (OUTPATIENT)
Dept: LAB | Facility: CLINIC | Age: 64
End: 2024-04-10
Payer: COMMERCIAL

## 2024-04-10 DIAGNOSIS — Z92.89 HISTORY OF DENTAL SURGERY: ICD-10-CM

## 2024-04-10 DIAGNOSIS — M25.561 ACUTE PAIN OF RIGHT KNEE: Primary | ICD-10-CM

## 2024-04-10 DIAGNOSIS — N18.30 STAGE 3 CHRONIC KIDNEY DISEASE, UNSPECIFIED WHETHER STAGE 3A OR 3B CKD (H): Primary | ICD-10-CM

## 2024-04-10 LAB — VIT D+METAB SERPL-MCNC: 42 NG/ML (ref 20–50)

## 2024-04-10 PROCEDURE — 82306 VITAMIN D 25 HYDROXY: CPT

## 2024-04-10 PROCEDURE — 97110 THERAPEUTIC EXERCISES: CPT | Mod: GP | Performed by: PHYSICAL THERAPIST

## 2024-04-10 PROCEDURE — 36415 COLL VENOUS BLD VENIPUNCTURE: CPT

## 2024-04-10 PROCEDURE — 82043 UR ALBUMIN QUANTITATIVE: CPT

## 2024-04-10 PROCEDURE — 82570 ASSAY OF URINE CREATININE: CPT

## 2024-04-10 ASSESSMENT — ACTIVITIES OF DAILY LIVING (ADL)
WEAKNESS: I HAVE THE SYMPTOM BUT IT DOES NOT AFFECT MY ACTIVITY
KNEE_ACTIVITY_OF_DAILY_LIVING_SUM: 54
RISE FROM A CHAIR: ACTIVITY IS NOT DIFFICULT
PAIN: THE SYMPTOM AFFECTS MY ACTIVITY SLIGHTLY
RAW_SCORE: 54
SQUAT: ACTIVITY IS MINIMALLY DIFFICULT
HOW_WOULD_YOU_RATE_THE_OVERALL_FUNCTION_OF_YOUR_KNEE_DURING_YOUR_USUAL_DAILY_ACTIVITIES?: NEARLY NORMAL
AS_A_RESULT_OF_YOUR_KNEE_INJURY,_HOW_WOULD_YOU_RATE_YOUR_CURRENT_LEVEL_OF_DAILY_ACTIVITY?: NORMAL
KNEE_ACTIVITY_OF_DAILY_LIVING_SCORE: 77.14
SIT WITH YOUR KNEE BENT: ACTIVITY IS NOT DIFFICULT
WALK: ACTIVITY IS NOT DIFFICULT
KNEEL ON THE FRONT OF YOUR KNEE: ACTIVITY IS FAIRLY DIFFICULT
GO DOWN STAIRS: ACTIVITY IS MINIMALLY DIFFICULT
LIMPING: THE SYMPTOM AFFECTS MY ACTIVITY SLIGHTLY
GO UP STAIRS: ACTIVITY IS SOMEWHAT DIFFICULT
STAND: ACTIVITY IS NOT DIFFICULT
HOW_WOULD_YOU_RATE_THE_CURRENT_FUNCTION_OF_YOUR_KNEE_DURING_YOUR_USUAL_DAILY_ACTIVITIES_ON_A_SCALE_FROM_0_TO_100_WITH_100_BEING_YOUR_LEVEL_OF_KNEE_FUNCTION_PRIOR_TO_YOUR_INJURY_AND_0_BEING_THE_INABILITY_TO_PERFORM_ANY_OF_YOUR_USUAL_DAILY_ACTIVITIES?: 92
SWELLING: THE SYMPTOM AFFECTS MY ACTIVITY MODERATELY
STIFFNESS: I HAVE THE SYMPTOM BUT IT DOES NOT AFFECT MY ACTIVITY
GIVING WAY, BUCKLING OR SHIFTING OF KNEE: I DO NOT HAVE THE SYMPTOM

## 2024-04-10 NOTE — PROGRESS NOTES
04/10/24 0500   Appointment Info   Signing clinician's name / credentials Christine Tijerina, PT, DPT   Total/Authorized Visits eval and treat (POC 12)   Visits Used 7   Medical Diagnosis OA- right knee   PT Tx Diagnosis OA-right knee-acute pain   Other pertinent information pt 10 min late to appt   Progress Note/Certification   Onset of illness/injury or Date of Surgery 02/10/24   Therapy Frequency 1x/week   Predicted Duration 12 weeks   Progress Note Due Date 05/08/24   Progress Note Completed Date 02/22/24   PT Goal 1   Goal Identifier AROM   Goal Description Pt will demo right knee AROM to at least -5 to 120   Rationale to maximize safety and independence with performance of ADLs and functional tasks;to maximize safety and independence within the home;to maximize safety and independence within the community;to maximize safety and independence with self cares   Goal Progress goal met   Target Date 04/10/24   PT Goal 2   Goal Identifier Pain   Goal Description Pt will report at least 50% decrease in pain in right knee when standing and walking.   Rationale to maximize safety and independence with performance of ADLs and functional tasks;to maximize safety and independence within the home;to maximize safety and independence within the community;to maximize safety and independence with transportation;to maximize safety and independence with self cares   Goal Progress goal met   Target Date 04/10/24   Subjective Report   Subjective Report Pt reports he has not yet tried the steps since last visit today,  feels a bit stiff today,  has 1.5 pain level, but thte type of pain is not what it was at the first visit.   Objective Measure 1   Objective Measure ROM   Details 0-120   Therapeutic Procedure/Exercise   Therapeutic Procedures: strength, endurance, ROM, flexibility minutes (71149) 29   Ther Proc 1 Education   Ther Proc 1 - Details Established POC, discussed HARISH/PHYS of plan, HEP frequency and activity modification    PTRx Ther Proc 1 Supine Heel Slides   PTRx Ther Proc 1 - Details X10 bilateral   PTRx Ther Proc 2 Hip Flexion Straight Leg Raise   PTRx Ther Proc 2 - Details X10 bilateral   PTRx Ther Proc 3 Bridging #1   PTRx Ther Proc 3 - Details X10 good form   PTRx Ther Proc 4 Hip Abduction Straight Leg Raise   PTRx Ther Proc 4 - Details X10   PTRx Ther Proc 5 Toe Raises   PTRx Ther Proc 5 - Details X10   PTRx Ther Proc 6 Standing Gastroc Stretch   PTRx Ther Proc 6 - Details 30 sec each side   Skilled Intervention Cues for form and control throughout   Patient Response/Progress pt IND with HeP   PTRx Ther Proc 7 Squat   PTRx Ther Proc 7 - Details X10 good form   Education   Learner/Method No Barriers to Learning   Education Comments no concerns   Plan   Home program PTrx HEP   Total Session Time   Timed Code Treatment Minutes 29   Total Treatment Time (sum of timed and untimed services) 29         DISCHARGE  Reason for Discharge: Patient has met all goals.    Equipment Issued: NA    Discharge Plan: Patient to continue home program.    Referring Provider:  Noemí Tsia

## 2024-04-11 LAB
CREAT UR-MCNC: 88.6 MG/DL
MICROALBUMIN UR-MCNC: <12 MG/L
MICROALBUMIN/CREAT UR: NORMAL MG/G{CREAT}

## 2024-04-25 ENCOUNTER — MYC MEDICAL ADVICE (OUTPATIENT)
Dept: PODIATRY | Facility: CLINIC | Age: 64
End: 2024-04-25
Payer: COMMERCIAL

## 2024-04-25 NOTE — TELEPHONE ENCOUNTER
Please see patient MyChart message and advise on response, or if patient should be directed to business office.     Briseida Washington MSA, ATC  Certified Athletic Trainer

## 2024-07-27 DIAGNOSIS — E03.9 ACQUIRED HYPOTHYROIDISM: ICD-10-CM

## 2024-07-29 RX ORDER — LEVOTHYROXINE SODIUM 150 UG/1
TABLET ORAL
Qty: 90 TABLET | Refills: 0 | Status: SHIPPED | OUTPATIENT
Start: 2024-07-29

## 2024-10-20 ENCOUNTER — HEALTH MAINTENANCE LETTER (OUTPATIENT)
Age: 64
End: 2024-10-20

## 2024-10-21 SDOH — HEALTH STABILITY: PHYSICAL HEALTH: ON AVERAGE, HOW MANY DAYS PER WEEK DO YOU ENGAGE IN MODERATE TO STRENUOUS EXERCISE (LIKE A BRISK WALK)?: 7 DAYS

## 2024-10-21 SDOH — HEALTH STABILITY: PHYSICAL HEALTH: ON AVERAGE, HOW MANY MINUTES DO YOU ENGAGE IN EXERCISE AT THIS LEVEL?: 40 MIN

## 2024-10-21 ASSESSMENT — SOCIAL DETERMINANTS OF HEALTH (SDOH): HOW OFTEN DO YOU GET TOGETHER WITH FRIENDS OR RELATIVES?: PATIENT DECLINED

## 2024-10-24 ENCOUNTER — OFFICE VISIT (OUTPATIENT)
Dept: INTERNAL MEDICINE | Facility: CLINIC | Age: 64
End: 2024-10-24
Attending: INTERNAL MEDICINE
Payer: COMMERCIAL

## 2024-10-24 VITALS
WEIGHT: 219.6 LBS | SYSTOLIC BLOOD PRESSURE: 127 MMHG | HEART RATE: 52 BPM | BODY MASS INDEX: 34.47 KG/M2 | HEIGHT: 67 IN | DIASTOLIC BLOOD PRESSURE: 77 MMHG | RESPIRATION RATE: 14 BRPM | TEMPERATURE: 96.8 F | OXYGEN SATURATION: 96 %

## 2024-10-24 DIAGNOSIS — E78.5 HYPERLIPIDEMIA LDL GOAL <130: ICD-10-CM

## 2024-10-24 DIAGNOSIS — Z00.00 ENCOUNTER FOR PREVENTIVE HEALTH EXAMINATION: Primary | ICD-10-CM

## 2024-10-24 DIAGNOSIS — R73.9 BLOOD GLUCOSE ELEVATED: ICD-10-CM

## 2024-10-24 DIAGNOSIS — I10 BENIGN ESSENTIAL HYPERTENSION: ICD-10-CM

## 2024-10-24 DIAGNOSIS — E03.9 ACQUIRED HYPOTHYROIDISM: ICD-10-CM

## 2024-10-24 DIAGNOSIS — N18.30 STAGE 3 CHRONIC KIDNEY DISEASE, UNSPECIFIED WHETHER STAGE 3A OR 3B CKD (H): ICD-10-CM

## 2024-10-24 DIAGNOSIS — Z12.5 SCREENING PSA (PROSTATE SPECIFIC ANTIGEN): ICD-10-CM

## 2024-10-24 LAB
ANION GAP SERPL CALCULATED.3IONS-SCNC: 10 MMOL/L (ref 7–15)
BUN SERPL-MCNC: 17.2 MG/DL (ref 8–23)
CALCIUM SERPL-MCNC: 10 MG/DL (ref 8.8–10.4)
CHLORIDE SERPL-SCNC: 100 MMOL/L (ref 98–107)
CHOLEST SERPL-MCNC: 187 MG/DL
CREAT SERPL-MCNC: 1.16 MG/DL (ref 0.67–1.17)
EGFRCR SERPLBLD CKD-EPI 2021: 70 ML/MIN/1.73M2
FASTING STATUS PATIENT QL REPORTED: NO
FASTING STATUS PATIENT QL REPORTED: NO
GLUCOSE SERPL-MCNC: 146 MG/DL (ref 70–99)
HCO3 SERPL-SCNC: 30 MMOL/L (ref 22–29)
HDLC SERPL-MCNC: 45 MG/DL
HGB BLD-MCNC: 17.7 G/DL (ref 13.3–17.7)
LDLC SERPL CALC-MCNC: 112 MG/DL
NONHDLC SERPL-MCNC: 142 MG/DL
POTASSIUM SERPL-SCNC: 3.9 MMOL/L (ref 3.4–5.3)
PSA SERPL DL<=0.01 NG/ML-MCNC: 1.7 NG/ML (ref 0–4.5)
SODIUM SERPL-SCNC: 140 MMOL/L (ref 135–145)
TRIGL SERPL-MCNC: 150 MG/DL
TSH SERPL DL<=0.005 MIU/L-ACNC: 1.34 UIU/ML (ref 0.3–4.2)

## 2024-10-24 PROCEDURE — 36415 COLL VENOUS BLD VENIPUNCTURE: CPT | Performed by: NURSE PRACTITIONER

## 2024-10-24 PROCEDURE — 99214 OFFICE O/P EST MOD 30 MIN: CPT | Mod: 25 | Performed by: NURSE PRACTITIONER

## 2024-10-24 PROCEDURE — G0402 INITIAL PREVENTIVE EXAM: HCPCS | Performed by: NURSE PRACTITIONER

## 2024-10-24 PROCEDURE — 80061 LIPID PANEL: CPT | Performed by: NURSE PRACTITIONER

## 2024-10-24 PROCEDURE — 83036 HEMOGLOBIN GLYCOSYLATED A1C: CPT | Performed by: NURSE PRACTITIONER

## 2024-10-24 PROCEDURE — 84443 ASSAY THYROID STIM HORMONE: CPT | Performed by: NURSE PRACTITIONER

## 2024-10-24 PROCEDURE — G0103 PSA SCREENING: HCPCS | Performed by: NURSE PRACTITIONER

## 2024-10-24 PROCEDURE — 80048 BASIC METABOLIC PNL TOTAL CA: CPT | Performed by: NURSE PRACTITIONER

## 2024-10-24 PROCEDURE — 85018 HEMOGLOBIN: CPT | Performed by: NURSE PRACTITIONER

## 2024-10-24 RX ORDER — TRIAMTERENE AND HYDROCHLOROTHIAZIDE 37.5; 25 MG/1; MG/1
1 TABLET ORAL DAILY
Qty: 90 TABLET | Refills: 3 | Status: SHIPPED | OUTPATIENT
Start: 2024-10-24 | End: 2024-10-28

## 2024-10-24 RX ORDER — ATENOLOL 50 MG/1
50 TABLET ORAL DAILY
Qty: 90 TABLET | Refills: 3 | Status: SHIPPED | OUTPATIENT
Start: 2024-10-24 | End: 2024-10-28

## 2024-10-24 RX ORDER — LEVOTHYROXINE SODIUM 150 UG/1
150 TABLET ORAL DAILY
Qty: 90 TABLET | Refills: 3 | Status: SHIPPED | OUTPATIENT
Start: 2024-10-24 | End: 2024-10-28

## 2024-10-24 ASSESSMENT — PATIENT HEALTH QUESTIONNAIRE - PHQ9
10. IF YOU CHECKED OFF ANY PROBLEMS, HOW DIFFICULT HAVE THESE PROBLEMS MADE IT FOR YOU TO DO YOUR WORK, TAKE CARE OF THINGS AT HOME, OR GET ALONG WITH OTHER PEOPLE: NOT DIFFICULT AT ALL
SUM OF ALL RESPONSES TO PHQ QUESTIONS 1-9: 4
SUM OF ALL RESPONSES TO PHQ QUESTIONS 1-9: 4

## 2024-10-24 ASSESSMENT — PAIN SCALES - GENERAL: PAINLEVEL_OUTOF10: NO PAIN (0)

## 2024-10-24 NOTE — PROGRESS NOTES
"Preventive Care Visit  [facility]  Gracie Gill CNP, Internal Medicine  Oct 24, 2024      Assessment & Plan     Encounter for preventive health examination  Counseling: counseling provided regarding nutrition, regular exercise, general safety and periodic health exams   Immunizations are up to date  Colonoscopy up to date  Lab work today     Stage 3 chronic kidney disease, unspecified whether stage 3a or 3b CKD (H)  -has been stable, repeat lab work   - BASIC METABOLIC PANEL; Future  - Hemoglobin; Future  - BASIC METABOLIC PANEL  - Hemoglobin    Hyperlipidemia LDL goal <130  -check lipid, not on medications    The 10-year ASCVD risk score (Juli DELEON, et al., 2019) is: 14.8%    Values used to calculate the score:      Age: 64 years      Sex: Male      Is Non- : No      Diabetic: No      Tobacco smoker: No      Systolic Blood Pressure: 127 mmHg      Is BP treated: Yes      HDL Cholesterol: 45 mg/dL      Total Cholesterol: 187 mg/dL    - Lipid panel reflex to direct LDL Non-fasting; Future  - Lipid panel reflex to direct LDL Non-fasting    Acquired hypothyroidism  -stable on Levothyroxine, check tsh     - TSH WITH FREE T4 REFLEX; Future  - TSH WITH FREE T4 REFLEX    Benign essential hypertension  -stable on maxzide     Screening PSA (prostate specific antigen)    - PSA, screen; Future  - PSA, screen    Blood glucose elevated  -add A1c, patient was not fasting   - Hemoglobin A1c; Future  - Hemoglobin A1c        BMI  Estimated body mass index is 34.39 kg/m  as calculated from the following:    Height as of this encounter: 1.702 m (5' 7\").    Weight as of this encounter: 99.6 kg (219 lb 9.6 oz).   Weight management plan: Discussed healthy diet and exercise guidelines    Counseling  Appropriate preventive services were addressed with this patient via screening, questionnaire, or discussion as appropriate for fall prevention, nutrition, physical activity, Tobacco-use " cessation, social engagement, weight loss and cognition.  Checklist reviewing preventive services available has been given to the patient.          Subjective   Andrew is a 64 year old, presenting for the following:  Wellness Visit (Not fasting)        10/24/2024    10:45 AM   Additional Questions   Roomed by Tiff MEZA   Accompanied by n/a          CHIOMA  Andrew presents to the clinic today for Medicare physical.  He is feeling well today without concerns.  He does continue to take medications as prescribed.        Health Care Directive  Patient does not have a Health Care Directive: Patient states has Advance Directive and will bring in a copy to clinic.      10/21/2024   General Health   How would you rate your overall physical health? Good   Feel stress (tense, anxious, or unable to sleep) Rather much      (!) STRESS CONCERN      10/21/2024   Nutrition   Diet: Regular (no restrictions)            10/21/2024   Exercise   Days per week of moderate/strenous exercise 7 days   Average minutes spent exercising at this level 40 min            10/21/2024   Social Factors   Frequency of gathering with friends or relatives Patient declined   Worry food won't last until get money to buy more Yes   Food not last or not have enough money for food? Yes   Do you have housing? (Housing is defined as stable permanent housing and does not include staying ouside in a car, in a tent, in an abandoned building, in an overnight shelter, or couch-surfing.) Yes   Are you worried about losing your housing? Yes   Lack of transportation? No   Unable to get utilities (heat,electricity)? No   Want help with housing or utility concern? No      (!) FOOD SECURITY CONCERN PRESENT(!) HOUSING CONCERN PRESENT      10/21/2024   Fall Risk   Fallen 2 or more times in the past year? No     No    Trouble with walking or balance? No     No        Patient-reported    Multiple values from one day are sorted in reverse-chronological order          10/21/2024    Activities of Daily Living- Home Safety   Needs help with the following daily activites Housework   Safety concerns in the home None of the above            10/21/2024   Dental   Dentist two times every year? Yes            10/21/2024   Hearing Screening   Hearing concerns? (!) TROUBLE UNDERSTANDING SOFT OR WHISPERED SPEECH.            10/21/2024   Driving Risk Screening   Patient/family members have concerns about driving No            10/21/2024   General Alertness/Fatigue Screening   Have you been more tired than usual lately? No            10/21/2024   Urinary Incontinence Screening   Bothered by leaking urine in past 6 months No            10/21/2024   TB Screening   Were you born outside of the [place]? No          Today's PHQ-9 Score:       10/24/2024    10:32 AM   PHQ-9 SCORE   PHQ-9 Total Score MyChart 4 (Minimal depression)   PHQ-9 Total Score 4        Patient-reported         10/21/2024   Substance Use   Alcohol more than 3/day or more than 7/wk No   Do you have a current opioid prescription? No   How severe/bad is pain from 1 to 10? 0/10 (No Pain)   Do you use any other substances recreationally? No        Social History     Tobacco Use    Smoking status: Never    Smokeless tobacco: Never   Vaping Use    Vaping status: Never Used   Substance Use Topics    Alcohol use: Not Currently     Comment: extremely rare    Drug use: Never           10/21/2024   AAA Screening   Family history of Abdominal Aortic Aneurysm (AAA)? No      Last PSA:   PSA   Date Value Ref Range Status   06/04/2021 1.21 0 - 4 ug/L Final     Comment:     Assay Method:  Chemiluminescence using Siemens Vista analyzer     Prostate Specific Antigen Screen   Date Value Ref Range Status   10/24/2024 1.70 0.00 - 4.50 ng/mL Final     ASCVD Risk   The 10-year ASCVD risk score (Juli DELEON, et al., 2019) is: 14.8%    Values used to calculate the score:      Age: 64 years      Sex: Male      Is Non- : No      Diabetic:  "No      Tobacco smoker: No      Systolic Blood Pressure: 127 mmHg      Is BP treated: Yes      HDL Cholesterol: 45 mg/dL      Total Cholesterol: 187 mg/dL            Reviewed and updated as needed this visit by Provider                      Current providers sharing in care for this patient include:  Patient Care Team:  No Ref-Primary, Physician as PCP - General  Audi Rdz MD as Assigned PCP  Funmilayo Arshad DO as Assigned Musculoskeletal Provider  Deshaun Falcon DPM as Assigned Surgical Provider  Noemí Tsai DO as Assigned Neuroscience Provider    The following health maintenance items are reviewed in Epic and correct as of today:  Health Maintenance   Topic Date Due    DEPRESSION ACTION PLAN  Never done    MEDICARE ANNUAL WELLNESS VISIT  08/18/2024    ANNUAL REVIEW OF HM ORDERS  08/18/2024    Pneumococcal Vaccine: 65+ Years (1 of 1 - PCV) 07/31/2024    MICROALBUMIN  04/10/2025    PHQ-9  04/24/2025    BMP  10/24/2025    LIPID  10/24/2025    TSH W/FREE T4 REFLEX  10/24/2025    FALL RISK ASSESSMENT  10/24/2025    HEMOGLOBIN  10/24/2025    DTAP/TDAP/TD IMMUNIZATION (4 - Td or Tdap) 05/23/2027    GLUCOSE  10/24/2027    COLORECTAL CANCER SCREENING  10/05/2028    ADVANCE CARE PLANNING  10/24/2029    HEPATITIS C SCREENING  Completed    HIV SCREENING  Completed    INFLUENZA VACCINE  Completed    URINALYSIS  Completed    ZOSTER IMMUNIZATION  Completed    RSV VACCINE  Completed    COVID-19 Vaccine  Completed    HPV IMMUNIZATION  Aged Out    MENINGITIS IMMUNIZATION  Aged Out    RSV MONOCLONAL ANTIBODY  Aged Out       Review of Systems   Constitutional: Negative.    Respiratory: Negative.     Cardiovascular: Negative.    Gastrointestinal: Negative.         Objective    Exam  /77 (BP Location: Left arm, Cuff Size: Adult Regular)   Pulse 52   Temp 96.8  F (36  C) (Tympanic)   Resp 14   Ht 1.702 m (5' 7\")   Wt 99.6 kg (219 lb 9.6 oz)   SpO2 96%   BMI 34.39 kg/m     Estimated body mass " "index is 34.39 kg/m  as calculated from the following:    Height as of this encounter: 1.702 m (5' 7\").    Weight as of this encounter: 99.6 kg (219 lb 9.6 oz).    Physical Exam  Vitals and nursing note reviewed.   Constitutional:       General: He is not in acute distress.     Appearance: Normal appearance. He is not ill-appearing.   HENT:      Head: Normocephalic and atraumatic.      Right Ear: Tympanic membrane, ear canal and external ear normal.      Left Ear: Tympanic membrane, ear canal and external ear normal.      Nose: Nose normal.      Mouth/Throat:      Mouth: Mucous membranes are moist.      Pharynx: Oropharynx is clear.   Eyes:      Extraocular Movements: Extraocular movements intact.      Conjunctiva/sclera: Conjunctivae normal.      Pupils: Pupils are equal, round, and reactive to light.   Cardiovascular:      Rate and Rhythm: Normal rate and regular rhythm.      Pulses: Normal pulses.      Heart sounds: Normal heart sounds.   Pulmonary:      Effort: Pulmonary effort is normal.      Breath sounds: Normal breath sounds.   Abdominal:      General: Bowel sounds are normal. There is no distension.      Palpations: Abdomen is soft. There is no mass.      Tenderness: There is no abdominal tenderness. There is no guarding.   Musculoskeletal:         General: Normal range of motion.      Cervical back: Normal range of motion.   Skin:     General: Skin is warm and dry.   Neurological:      General: No focal deficit present.      Mental Status: He is alert and oriented to person, place, and time. Mental status is at baseline.   Psychiatric:         Mood and Affect: Mood normal.         Behavior: Behavior normal.                10/24/2024   Mini Cog   Clock Draw Score 2 Normal   3 Item Recall 3 objects recalled   Mini Cog Total Score 5            Vision Screen  Patient wears corrective lenses (select all that apply): Worn during vision screen  Vision Screen Results: Pass      Signed Electronically by: Gracie VAZQUEZ" Papke, CNP    Answers submitted by the patient for this visit:  Patient Health Questionnaire (Submitted on 10/24/2024)  If you checked off any problems, how difficult have these problems made it for you to do your work, take care of things at home, or get along with other people?: Not difficult at all  PHQ9 TOTAL SCORE: 4

## 2024-10-24 NOTE — LETTER
October 30, 2024      Andrew Shoemaker  [address] [place] [address]        Dear ,    We are writing to inform you of your test results.    Kidney function, liver enzymes, and electrolytes are all normal. Blood sugar came back a bit elevated so I added on a A1c which shows us average blood sugar over the past 3 months which came back normal.  Cholesterol looks ok, much better than last year.  Thyroid is normal.  Prostate lab is normal.  Hemoglobin is normal.       Resulted Orders   BASIC METABOLIC PANEL   Result Value Ref Range    Sodium 140 135 - 145 mmol/L    Potassium 3.9 3.4 - 5.3 mmol/L    Chloride 100 98 - 107 mmol/L    Carbon Dioxide (CO2) 30 (H) 22 - 29 mmol/L    Anion Gap 10 7 - 15 mmol/L    Urea Nitrogen 17.2 8.0 - 23.0 mg/dL    Creatinine 1.16 0.67 - 1.17 mg/dL    GFR Estimate 70 >60 mL/min/1.73m2      Comment:      eGFR calculated using 2021 CKD-EPI equation.    Calcium 10.0 8.8 - 10.4 mg/dL      Comment:      Reference intervals for this test were updated on 7/16/2024 to reflect our healthy population more accurately. There may be differences in the flagging of prior results with similar values performed with this method. Those prior results can be interpreted in the context of the updated reference intervals.    Glucose 146 (H) 70 - 99 mg/dL    Patient Fasting > 8hrs? No    Lipid panel reflex to direct LDL Non-fasting   Result Value Ref Range    Cholesterol 187 <200 mg/dL    Triglycerides 150 (H) <150 mg/dL    Direct Measure HDL 45 >=40 mg/dL    LDL Cholesterol Calculated 112 (H) <100 mg/dL    Non HDL Cholesterol 142 (H) <130 mg/dL    Patient Fasting > 8hrs? No     Narrative    Cholesterol  Desirable: < 200 mg/dL  Borderline High: 200 - 239 mg/dL  High: >= 240 mg/dL    Triglycerides  Normal: < 150 mg/dL  Borderline High: 150 - 199 mg/dL  High: 200-499 mg/dL  Very High: >= 500 mg/dL    Direct Measure HDL  Female: >= 50 mg/dL   Male: >= 40 mg/dL    LDL Cholesterol  Desirable: < 100  mg/dL  Above Desirable: 100 - 129 mg/dL   Borderline High: 130 - 159 mg/dL   High:  160 - 189 mg/dL   Very High: >= 190 mg/dL    Non HDL Cholesterol  Desirable: < 130 mg/dL  Above Desirable: 130 - 159 mg/dL  Borderline High: 160 - 189 mg/dL  High: 190 - 219 mg/dL  Very High: >= 220 mg/dL   TSH WITH FREE T4 REFLEX   Result Value Ref Range    TSH 1.34 0.30 - 4.20 uIU/mL   Hemoglobin   Result Value Ref Range    Hemoglobin 17.7 13.3 - 17.7 g/dL   PSA, screen   Result Value Ref Range    Prostate Specific Antigen Screen 1.70 0.00 - 4.50 ng/mL    Narrative    This result is obtained using the Roche Elecsys total PSA method on the tono e801 immunoassay analyzer, which is an ultrasensitive method. Results obtained with different assay methods or kits cannot be used interchangeably.  This test is intended for initial prostate cancer screening. PSA values exceeding the age-specific limits are suspicious for prostate disease, but additional testing, such as prostate biopsy, is needed to diagnose prostate pathology. The American Cancer Society recommends annual examination with digital rectal examination and serum PSA beginning at age 50 and for men with a life expectancy of at least 10 years after detection of prostate cancer. For men in high-risk groups, such as  Americans or men with a first-degree relative diagnosed at a younger age, testing should begin at a younger age. It is generally recommended that information be provided to patients about the benefits and limitations of testing and treatment so they can make informed decisions.   Hemoglobin A1c   Result Value Ref Range    Estimated Average Glucose 105 <117 mg/dL    Hemoglobin A1C 5.3 0.0 - 5.6 %      Comment:      Normal <5.7%   Prediabetes 5.7-6.4%    Diabetes 6.5% or higher     Note: Adopted from ADA consensus guidelines.       If you have any questions or concerns, please call the clinic at the number listed above.       Sincerely,      Gracie Gill,  CNP

## 2024-10-25 ENCOUNTER — TELEPHONE (OUTPATIENT)
Dept: INTERNAL MEDICINE | Facility: CLINIC | Age: 64
End: 2024-10-25
Payer: COMMERCIAL

## 2024-10-25 NOTE — TELEPHONE ENCOUNTER
FYI - Status Update    Who is Calling: patient    Update: Patient call to let  Dr Levy care team be aware that [employer] [facility] is not patient medication manager. Patients says [facility] in [place], [place] will be his new medication managers and they will be faxing forms to our clinic    Does caller want a call/response back: No

## 2024-10-27 ENCOUNTER — MYC MEDICAL ADVICE (OUTPATIENT)
Dept: INTERNAL MEDICINE | Facility: CLINIC | Age: 64
End: 2024-10-27
Payer: COMMERCIAL

## 2024-10-27 DIAGNOSIS — E03.9 ACQUIRED HYPOTHYROIDISM: ICD-10-CM

## 2024-10-27 DIAGNOSIS — I10 BENIGN ESSENTIAL HYPERTENSION: ICD-10-CM

## 2024-10-28 LAB
EST. AVERAGE GLUCOSE BLD GHB EST-MCNC: 105 MG/DL
HBA1C MFR BLD: 5.3 % (ref 0–5.6)

## 2024-10-28 RX ORDER — ATENOLOL 50 MG/1
50 TABLET ORAL DAILY
Qty: 90 TABLET | Refills: 3 | Status: SHIPPED | OUTPATIENT
Start: 2024-10-28

## 2024-10-28 RX ORDER — LEVOTHYROXINE SODIUM 150 UG/1
150 TABLET ORAL DAILY
Qty: 90 TABLET | Refills: 3 | Status: SHIPPED | OUTPATIENT
Start: 2024-10-28

## 2024-10-28 RX ORDER — TRIAMTERENE AND HYDROCHLOROTHIAZIDE 37.5; 25 MG/1; MG/1
1 TABLET ORAL DAILY
Qty: 90 TABLET | Refills: 3 | Status: SHIPPED | OUTPATIENT
Start: 2024-10-28

## 2024-10-30 ASSESSMENT — ENCOUNTER SYMPTOMS
CONSTITUTIONAL NEGATIVE: 1
GASTROINTESTINAL NEGATIVE: 1
CARDIOVASCULAR NEGATIVE: 1
RESPIRATORY NEGATIVE: 1

## 2025-04-12 ENCOUNTER — HEALTH MAINTENANCE LETTER (OUTPATIENT)
Age: 65
End: 2025-04-12